# Patient Record
Sex: MALE | Race: WHITE | NOT HISPANIC OR LATINO | Employment: FULL TIME | URBAN - METROPOLITAN AREA
[De-identification: names, ages, dates, MRNs, and addresses within clinical notes are randomized per-mention and may not be internally consistent; named-entity substitution may affect disease eponyms.]

---

## 2023-09-28 PROBLEM — E78.00 HYPERCHOLESTEROLEMIA: Status: ACTIVE | Noted: 2023-09-28

## 2023-09-28 PROBLEM — I25.10 CORONARY ARTERY DISEASE: Status: ACTIVE | Noted: 2023-09-28

## 2023-09-28 PROBLEM — I10 BENIGN ESSENTIAL HTN: Status: ACTIVE | Noted: 2023-09-28

## 2023-09-28 RX ORDER — AMLODIPINE BESYLATE 2.5 MG/1
2.5 TABLET ORAL DAILY
COMMUNITY

## 2023-09-28 RX ORDER — NAPROXEN SODIUM 220 MG/1
TABLET, FILM COATED ORAL
COMMUNITY
Start: 2021-02-19

## 2023-09-28 RX ORDER — ROSUVASTATIN CALCIUM 5 MG/1
1 TABLET, COATED ORAL DAILY
COMMUNITY
Start: 2021-02-19 | End: 2024-05-02 | Stop reason: WASHOUT

## 2023-10-03 ENCOUNTER — DOCUMENTATION (OUTPATIENT)
Dept: NEUROLOGY | Facility: CLINIC | Age: 73
End: 2023-10-03
Payer: COMMERCIAL

## 2023-10-03 NOTE — PROGRESS NOTES
RESULT REVIEW    Reviewed outside MRI brain films on PACS from [facility], on the patient's request.  Minimal periventricular ischemic changes and mild atrophy.  MRA was done as well.  No report is available from [facility].

## 2023-10-04 ENCOUNTER — OFFICE VISIT (OUTPATIENT)
Dept: NEUROLOGY | Facility: CLINIC | Age: 73
End: 2023-10-04
Payer: COMMERCIAL

## 2023-10-04 VITALS
HEART RATE: 76 BPM | DIASTOLIC BLOOD PRESSURE: 75 MMHG | SYSTOLIC BLOOD PRESSURE: 119 MMHG | BODY MASS INDEX: 27.62 KG/M2 | WEIGHT: 221 LBS

## 2023-10-04 DIAGNOSIS — G20.C PARKINSONISM, UNSPECIFIED PARKINSONISM TYPE (MULTI): Primary | ICD-10-CM

## 2023-10-04 PROBLEM — R25.1 TREMOR: Status: ACTIVE | Noted: 2023-10-04

## 2023-10-04 PROCEDURE — 1036F TOBACCO NON-USER: CPT | Performed by: PSYCHIATRY & NEUROLOGY

## 2023-10-04 PROCEDURE — 1159F MED LIST DOCD IN RCRD: CPT | Performed by: PSYCHIATRY & NEUROLOGY

## 2023-10-04 PROCEDURE — 3074F SYST BP LT 130 MM HG: CPT | Performed by: PSYCHIATRY & NEUROLOGY

## 2023-10-04 PROCEDURE — 1160F RVW MEDS BY RX/DR IN RCRD: CPT | Performed by: PSYCHIATRY & NEUROLOGY

## 2023-10-04 PROCEDURE — 1125F AMNT PAIN NOTED PAIN PRSNT: CPT | Performed by: PSYCHIATRY & NEUROLOGY

## 2023-10-04 PROCEDURE — 3078F DIAST BP <80 MM HG: CPT | Performed by: PSYCHIATRY & NEUROLOGY

## 2023-10-04 PROCEDURE — 99213 OFFICE O/P EST LOW 20 MIN: CPT | Performed by: PSYCHIATRY & NEUROLOGY

## 2023-10-04 RX ORDER — CARBIDOPA AND LEVODOPA 25; 100 MG/1; MG/1
1 TABLET ORAL 3 TIMES DAILY
COMMUNITY
Start: 2023-07-26 | End: 2024-05-02 | Stop reason: WASHOUT

## 2023-10-04 RX ORDER — ATORVASTATIN CALCIUM 40 MG/1
20 TABLET, FILM COATED ORAL NIGHTLY
COMMUNITY
Start: 2023-09-12 | End: 2024-05-02 | Stop reason: WASHOUT

## 2023-10-04 RX ORDER — AZITHROMYCIN 250 MG/1
TABLET, FILM COATED ORAL
COMMUNITY
Start: 2023-08-18 | End: 2024-05-02 | Stop reason: WASHOUT

## 2023-10-04 ASSESSMENT — PATIENT HEALTH QUESTIONNAIRE - PHQ9
SUM OF ALL RESPONSES TO PHQ9 QUESTIONS 1 AND 2: 0
2. FEELING DOWN, DEPRESSED OR HOPELESS: NOT AT ALL
1. LITTLE INTEREST OR PLEASURE IN DOING THINGS: NOT AT ALL

## 2023-10-04 ASSESSMENT — UNIFIED PARKINSONS DISEASE RATING SCALE (UPDRS)
LEG_AGILITY_LEFT: 0
AMPLITUDE_LIP_JAW: 0
SPEECH: 1
POSTURAL_TREMOR_LEFTHAND: 1
TOETAPPING_RIGHT: 0
LEVODOPA: NO
RIGIDITY_RUE: 0
AMPLITUDE_RLE: 0
RIGIDITY_RLE: 1
PRONATION_SUPINATION_LEFT: 1
AMPLITUDE_LUE: 1
HANDMOVEMENTS_RIGHT: 0
CONSTANCY_TREMOR_ATREST: 1
FREEZING_GAIT: 0
RIGIDITY_LLE: 1
AMPLITUDE_LLE: 0
GAIT: 1
RIGIDITY_NECK: 0
POSTURAL_TREMOR_RIGHTHAND: 0
FINGER_TAPPING_RIGHT: 0
SPONTANEITY_OF_MOVEMENT: 1
DYSKINESIAS_PRESENT: NO
FINGER_TAPPING_LEFT: 1
POSTURE: 1
LEG_AGILITY_RIGHT: 0
KINETIC_TREMOR_RIGHTHAND: 0
TOETAPPING_LEFT: 0
KINETIC_TREMOR_LEFTHAND: 0
PRONATION_SUPINATION_RIGHT: 0
FACIAL_EXPRESSION: 1
CHAIR_RISING_SCALE: 1
POSTURAL_STABILITY: 0
TOTAL_SCORE: 15
RIGIDITY_LUE: 1
AMPLITUDE_RUE: 0

## 2023-10-04 ASSESSMENT — PAIN SCALES - GENERAL: PAINLEVEL: 6

## 2023-10-04 ASSESSMENT — ENCOUNTER SYMPTOMS
DEPRESSION: 0
LOSS OF SENSATION IN FEET: 0
OCCASIONAL FEELINGS OF UNSTEADINESS: 0

## 2023-10-04 NOTE — PROGRESS NOTES
Subjective     Khurram Nicole is a right handed  72 y.o. year old male who presents with FUV of PD.   Visit type: follow up visit     HPIHistory of Present Illness:   Mr. Nicole is a 71 YO RH man with HTN, CAD, depression here for FUV of PD.Today he reports that the Sinemet did not improve his symptoms.and the Sinemet made him sleepy and he weaned off the medication and the tremor did not change when he discontinued medication.    Prior HX:Patient reports tremors that started a couple years ago in his L arm present at rest and with posture/action like driving or doing work with his hands. At first if he thought about it he could stop it but now it seems to have a mind of its own. He denies stiffness, slowness, gait slowed down and wife has noticed decreased arm swing. He was started on C/L at [facility] in August 2022 25/100mg 1 tab TID with no reported change in sx. No DA blocker exposure.     He does report hyposmia since 1996 after an upper respiratory infection, No constipation or RBD.  he does report h/o depression years ago for which he was briefly on sertraline,     no memory changes or hallucinations, no urinary sx, no OH, no mood changes.    Review of Systems:   - Detailed reviewed of systems was reviewed with the patient and was negative unless otherwise noted.  It is documented in the patient assessment form to be scanned in.     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  Objective   Neurological Exam    Physical Exam   Office Visit from 10/4/2023 in [facility] with Aldo Nguyen MD    10/4/2023    1723   Motor Examination     Is the patient on medication for treating the symptoms of Parkinson's Disease? --   Is the patient on Levodopa? No   Speech 1   Facial Expression 1   Rigidty Neck 0   Rigidty RUE 0   Rigidity - LUE 1   Rigidity RLE 1   Rigidity LLE 1   Finger Tapping Right Hand 0   Finger Tapping Left Hand 1   Hand Movements- Right Hand 0   Hand Movements- Left Hand 1   Pronatiaon-Supination Movments - Right Hand 0   Pronatiaon-Supination Movments Left Hand 1   Toe Tapping Right Foot 0   Toe Tapping - Left Foot 0   Leg Agility - Right Leg 0   Leg Agility - Left leg 0   Arising from Chair 1   Gait 1   Freezing of Gait 0   Postural Stability 0   Posture 1   Global Spontanteity of Movment ( Body Bradykinesia) 1   Postural Tremor - Right Hand 0   Postural Tremor - Left hand 1   Kinetic Tremor - Right hand 0   Kinetic Tremor - Left hand 0   Rest Tremor Amplitude - RUE 0   Rest Tremor Amplitude - LUE 1   Rest Tremor Amplitude - RLE 0   Rest Tremor Amplitude - LLE 0   Rest Tremor Amplitude - Lip/Jaw 0   Constancy of Rest Tremor 1   MDS UPDRS Total Score 15   Were dyskinesias (chorea or dystonia) present during examination? No   Hoen and Yahr Stage --                     .                                       Assessment/Plan Mr. Nicole is a 73 YO RH man with HTN, depression for FUV of PD. He has Mild LH rest and postural tremor, asymmetric bradykinesia and decreased arm swing on LT side. There are no red flags for atypical parkinsonism at this time. No robust response to low-dose sinemet, and felt fatigue and discontinued Sinemet.his symptoms are very mild.Based on exam he has probable parkinsonism.Based on his exam I recommended him that he can not be on medication if he develops side effect with medication but if tremor is bothersome we can try another  types of medication such as Rasagiline.   Plan:  -Exercise    - RTC 6 months

## 2023-10-04 NOTE — PATIENT INSTRUCTIONS
You were visited by Dr Nguyen and Dr Henriquez today.  Based on your symptoms you have mild left  hand rest tremor and bradykinesia(slowness)in your Lt side more than on RT side.  Your symptoms are very mild and we don`t think you need medication at this time.As you developed side effects with sinemet.  Exercise can slow the progression of Parkinson disease.    Plan:  Try do exercise such as aerobic   Follow up in 6 months

## 2024-04-14 NOTE — PROGRESS NOTES
History of Present Complaint:  The patient was referred to us by Referring Provider: ***. this is 73 y.o.  male {Accompanied by:[id number]}with a past history of {kt past medical history:10795} hand pain after ORIF of left ring finger proximal phalanx and repair of brain and long finger extensor tendon injuries 11/9/2023 related to crush injury who continues to have pain on tramadol 3 times daily and Advil presenting with {kt mrdica symptoms:16317}    Pain started {pain onset:48253}  Pain is {Better or worse:84737}   Patient history significant for the following red flags: {Red flags:11494}  The pain is described as {Pain description:04462} and is relieved by {pain relief:34952}      Prior Pain Therapies: {Prior pain therapy:25134}    Past surgical history:  {Past surgical history:16670}           Procedures:   *** the patient has had a ***% improvement in pain.    Portions of record reviewed for pertinent issues: active problem list, medication list, allergies, family history, social history, notes from last encounter, encounters, lab results, imaging and other available records.    I have personally reviewed the OARRS report for this patient. This report is scanned into the electronic medical record. I have considered the risks of abuse, dependence, addiction and diversion. It showed: From [facility] and sometimes oxycodone  OPIOID RISK ASSESSMENT SCORE ***/26  Opioid agreement: ***  Activities of daily living: ***  Adverse effects: ***  Analgesia: W/O ***/10, W ***/10  {***}  Toxicology screen: ***  Aberrant behavior: ***      Diagnostic studies:  ***      Employment/disability/litigation: {ktlitigation:51853}    Social History:  {KT social history:95677}       Review of Systems       Physical Exam       Assessment  ***           Plan  At least 50% of the visit was involved in the discussion of the options for treatment. We discussed exercises, medication, interventional therapies and surgery. Healthy life  style is essential with patient hard work to achieve the wellness. In addition; discussion with the patient and/or family about any of the diagnostic results, impressions and/or recommended diagnostic studies, prognosis, risks and benefits of treatment options, instructions for treatment and/or follow-up, importance of compliance with chosen treatment options, risk-factor reduction, and patient/family education.         Pool therapy, walking in the pool, at least 3x per week for 30 minutes  *** Smoking cessation  Healthy lifestyle and anti-inflammatory diet in addition to weight control discussed with the patient  Alternative chronic pain therapies was discussed, encouraged and information was handed  Return to Clinic 3 months       *Please note this report has been produced using speech recognition software and may contain errors related to that system including grammar, punctuation and spelling as well as words and phrases that may be inappropriate. If there are questions or concerns, please feel free to contact me to clarify.    Miquel Winkler MD

## 2024-04-18 ENCOUNTER — APPOINTMENT (OUTPATIENT)
Dept: PAIN MEDICINE | Facility: CLINIC | Age: 74
End: 2024-04-18
Payer: COMMERCIAL

## 2024-04-29 NOTE — PROGRESS NOTES
Left ring finger proximal phalanx repair and tendon repair on 11/9/2023 continue to have pain on tramadol from PCP all at the [facility] Charlie Mcgee DO.  The patient was evaluated by Dr. Galo Martinez at the [facility] and he increased his vitamin D to 50,000 units daily and calcium and tramadol from PCP and the patient used CBD oil    [facility] case: Giagnosis:  S62.615B - fracture 4th finger   S61.213A - laceration middle finger     History of Present Complaint:  The patient was referred to us by Referring Provider: Charlie Mcgee DO.. this is 73 y.o.  male accompanied by his wife with a past history of HTN , Depression , hx of stroke , parkinson's , hyerlipodema  presenting with left hand pain after crush injury on 11/6/2023.  The patient had crushed bone in addition to tendon repair.  The patient continued to have pain requires tramadol from his PCP.  His wife asked about CRPS since she searched it.  I explained to her that he has signs of CRPS but CRPS diagnosis is by ruling out every other pathology.  His surgeon need to make this diagnosis if he thinks the original disease and pathology is being repaired and cured.  According to the x-ray report from 4/9/2024 the patient still has no bony bridges of his commuted fracture and his surgeon ordered bone stimulator and placed him on vitamin D and calcium in addition to Caltrate       Procedures:   none    Portions of record reviewed for pertinent issues: active problem list, medication list, allergies, family history, social history, notes from last encounter, encounters, lab results, imaging and other available records.    I have personally reviewed the OARRS report for this patient. This report is scanned into the electronic medical record. I have considered the risks of abuse, dependence, addiction and diversion. It showed: Tramadol and Percocet from Charlie Mcgee DO   OPIOID RISK ASSESSMENT SCORE 1/26  Aberrant behavior:  none      Diagnostic studies:  Stable ORIF of commuted fracture of the fourth finger with lack of bone bridging      Employment/disability/litigation: [employer] crush injury of the left hand,  for [employer]  Social History:  finished high school and some classes in college denies smoking drinking use of illicit drugs      Review of Systems   HENT: Negative.     Eyes: Negative.    Respiratory: Negative.     Cardiovascular: Negative.    Gastrointestinal: Negative.    Endocrine: Negative.    Genitourinary: Negative.    Musculoskeletal:  Positive for arthralgias and myalgias.   Skin: Negative.    Neurological: Negative.    Hematological: Negative.    Psychiatric/Behavioral: Negative.            Physical Exam  Vitals and nursing note reviewed.   Constitutional:       Appearance: Normal appearance.   HENT:      Head: Normocephalic and atraumatic.      Nose: Nose normal.   Eyes:      Extraocular Movements: Extraocular movements intact.      Conjunctiva/sclera: Conjunctivae normal.      Pupils: Pupils are equal, round, and reactive to light.   Cardiovascular:      Rate and Rhythm: Normal rate and regular rhythm.      Pulses: Normal pulses.      Heart sounds: Normal heart sounds.   Pulmonary:      Effort: Pulmonary effort is normal.      Breath sounds: Normal breath sounds.   Abdominal:      General: Abdomen is flat. Bowel sounds are normal.      Palpations: Abdomen is soft.   Skin:     General: Skin is warm.   Neurological:      Mental Status: He is alert.      Comments: Allodynia and hyperalgesia of the left hand mostly in the palm area with edema compared to the right hand and sweaty palms.  Pulses and warm hand.  Limited range of motion of his fingers   Psychiatric:         Mood and Affect: Mood normal.         Behavior: Behavior normal.            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  The patient gets tramadol from his PCP up to 3 times a day to control his pain.  He may have CRPS but since that he has nonunion I would like the surgeon to submit for his diagnosis of CRPS to his [facility].  In the meantime we will start him on gabapentin 100 mg slow titration, prednisone 10 mg taper dose, and keep him on the tramadol 50 mg 3 times daily as needed.  Neuro supplement ordered and the patient has new orders for occupational therapy from his surgeon.  Consider left stellate ganglion block if the above failed           Plan  At least 50% of the visit was involved in the discussion of the options for treatment. We discussed exercises, medication, interventional therapies and surgery. Healthy life style is essential with patient hard work to achieve the wellness. In addition; discussion with the patient and/or family about any of the diagnostic results, impressions and/or recommended diagnostic studies, prognosis, risks and benefits of treatment options, instructions for treatment and/or follow-up, importance of compliance with chosen treatment options, risk-factor reduction, and patient/family education.         Pool therapy, walking in the pool, at least 3x per week for 30 minutes  Neuro supplement ordered  Patient has a referral to occupational therapy from his surgeon  Prednisone 10 mg taper dose  Gabapentin 100 mg titration schedule handed to the patient  Consider left stellate ganglion block if the above failed  Healthy lifestyle and anti-inflammatory diet in addition to weight control discussed with the patient  Alternative chronic pain therapies was discussed, encouraged and information was handed  Return to Clinic 1-2 months       *Please note this report has been produced using speech recognition software and may contain errors related to that system including grammar, punctuation and spelling as well as words and phrases that may be inappropriate. If there are  questions or concerns, please feel free to contact me to clarify.    Miquel Winkler MD

## 2024-05-02 ENCOUNTER — OFFICE VISIT (OUTPATIENT)
Dept: PAIN MEDICINE | Facility: CLINIC | Age: 74
End: 2024-05-02
Payer: COMMERCIAL

## 2024-05-02 VITALS
WEIGHT: 230 LBS | HEIGHT: 75 IN | BODY MASS INDEX: 28.6 KG/M2 | RESPIRATION RATE: 18 BRPM | HEART RATE: 58 BPM | SYSTOLIC BLOOD PRESSURE: 151 MMHG | DIASTOLIC BLOOD PRESSURE: 90 MMHG | OXYGEN SATURATION: 94 % | TEMPERATURE: 98.1 F

## 2024-05-02 DIAGNOSIS — S67.22XS CRUSHING INJURY OF LEFT HAND, SEQUELA: ICD-10-CM

## 2024-05-02 DIAGNOSIS — G89.4 CHRONIC PAIN SYNDROME: ICD-10-CM

## 2024-05-02 DIAGNOSIS — S61.213S LACERATION OF LEFT MIDDLE FINGER WITHOUT FOREIGN BODY WITHOUT DAMAGE TO NAIL, SEQUELA: Primary | ICD-10-CM

## 2024-05-02 DIAGNOSIS — S62.615S: ICD-10-CM

## 2024-05-02 PROCEDURE — 1125F AMNT PAIN NOTED PAIN PRSNT: CPT | Performed by: ANESTHESIOLOGY

## 2024-05-02 PROCEDURE — 99214 OFFICE O/P EST MOD 30 MIN: CPT | Performed by: ANESTHESIOLOGY

## 2024-05-02 PROCEDURE — 3080F DIAST BP >= 90 MM HG: CPT | Performed by: ANESTHESIOLOGY

## 2024-05-02 PROCEDURE — 1159F MED LIST DOCD IN RCRD: CPT | Performed by: ANESTHESIOLOGY

## 2024-05-02 PROCEDURE — 1036F TOBACCO NON-USER: CPT | Performed by: ANESTHESIOLOGY

## 2024-05-02 PROCEDURE — 99204 OFFICE O/P NEW MOD 45 MIN: CPT | Performed by: ANESTHESIOLOGY

## 2024-05-02 PROCEDURE — 3077F SYST BP >= 140 MM HG: CPT | Performed by: ANESTHESIOLOGY

## 2024-05-02 RX ORDER — ACETAMINOPHEN 325 MG/1
650 TABLET ORAL EVERY 6 HOURS PRN
COMMUNITY
Start: 2023-11-07

## 2024-05-02 RX ORDER — ASCORBIC ACID 500 MG
500 TABLET ORAL DAILY
Qty: 30 TABLET | Refills: 11 | Status: SHIPPED | OUTPATIENT
Start: 2024-05-02

## 2024-05-02 RX ORDER — PREDNISONE 10 MG/1
TABLET ORAL
Qty: 64 TABLET | Refills: 0 | Status: SHIPPED | OUTPATIENT
Start: 2024-05-02

## 2024-05-02 RX ORDER — VITAMIN B COMPLEX
1 CAPSULE ORAL 2 TIMES DAILY
Qty: 60 CAPSULE | Refills: 11 | Status: SHIPPED | OUTPATIENT
Start: 2024-05-02 | End: 2025-05-02

## 2024-05-02 RX ORDER — ATORVASTATIN CALCIUM 20 MG/1
20 TABLET, FILM COATED ORAL NIGHTLY
COMMUNITY
Start: 2024-04-01

## 2024-05-02 RX ORDER — IBUPROFEN 200 MG
200 TABLET ORAL
COMMUNITY

## 2024-05-02 RX ORDER — GABAPENTIN 100 MG/1
100 CAPSULE ORAL 3 TIMES DAILY
Qty: 180 CAPSULE | Refills: 1 | Status: SHIPPED | OUTPATIENT
Start: 2024-05-02 | End: 2025-05-02

## 2024-05-02 RX ORDER — ERGOCALCIFEROL 1.25 MG/1
1 CAPSULE ORAL
COMMUNITY

## 2024-05-02 RX ORDER — TRAMADOL HYDROCHLORIDE 50 MG/1
50 TABLET ORAL EVERY 8 HOURS PRN
Qty: 21 TABLET | Refills: 0 | Status: SHIPPED | OUTPATIENT
Start: 2024-05-02 | End: 2024-05-09

## 2024-05-02 RX ORDER — ACETAMINOPHEN 160 MG/5ML
200 SUSPENSION, ORAL (FINAL DOSE FORM) ORAL 3 TIMES DAILY
Qty: 90 CAPSULE | Refills: 11 | Status: SHIPPED | OUTPATIENT
Start: 2024-05-02 | End: 2025-05-02

## 2024-05-02 RX ORDER — IBUPROFEN 100 MG/5ML
SUSPENSION, ORAL (FINAL DOSE FORM) ORAL
COMMUNITY

## 2024-05-02 RX ORDER — SERTRALINE HYDROCHLORIDE 100 MG/1
100 TABLET, FILM COATED ORAL
COMMUNITY
Start: 2024-01-08

## 2024-05-02 RX ORDER — PERPHENAZINE 16 MG
TABLET ORAL
Qty: 180 CAPSULE | Refills: 11 | Status: SHIPPED | OUTPATIENT
Start: 2024-05-02

## 2024-05-02 RX ORDER — TRAMADOL HYDROCHLORIDE 50 MG/1
50 TABLET ORAL EVERY 8 HOURS PRN
COMMUNITY
Start: 2024-04-30 | End: 2024-05-07

## 2024-05-02 ASSESSMENT — ENCOUNTER SYMPTOMS
EYES NEGATIVE: 1
OCCASIONAL FEELINGS OF UNSTEADINESS: 0
ENDOCRINE NEGATIVE: 1
RESPIRATORY NEGATIVE: 1
HEMATOLOGIC/LYMPHATIC NEGATIVE: 1
GASTROINTESTINAL NEGATIVE: 1
PSYCHIATRIC NEGATIVE: 1
MYALGIAS: 1
ARTHRALGIAS: 1
DEPRESSION: 0
NEUROLOGICAL NEGATIVE: 1
CARDIOVASCULAR NEGATIVE: 1
LOSS OF SENSATION IN FEET: 0

## 2024-05-02 ASSESSMENT — LIFESTYLE VARIABLES
HOW OFTEN DURING THE LAST YEAR HAVE YOU HAD A FEELING OF GUILT OR REMORSE AFTER DRINKING: NEVER
HAVE YOU OR SOMEONE ELSE BEEN INJURED AS A RESULT OF YOUR DRINKING: NO
HOW MANY STANDARD DRINKS CONTAINING ALCOHOL DO YOU HAVE ON A TYPICAL DAY: PATIENT DOES NOT DRINK
AUDIT TOTAL SCORE: 0
HOW OFTEN DO YOU HAVE SIX OR MORE DRINKS ON ONE OCCASION: NEVER
HOW OFTEN DURING THE LAST YEAR HAVE YOU FOUND THAT YOU WERE NOT ABLE TO STOP DRINKING ONCE YOU HAD STARTED: NEVER
HOW OFTEN DURING THE LAST YEAR HAVE YOU BEEN UNABLE TO REMEMBER WHAT HAPPENED THE NIGHT BEFORE BECAUSE YOU HAD BEEN DRINKING: NEVER
HOW OFTEN DURING THE LAST YEAR HAVE YOU NEEDED AN ALCOHOLIC DRINK FIRST THING IN THE MORNING TO GET YOURSELF GOING AFTER A NIGHT OF HEAVY DRINKING: NEVER
TOTAL SCORE: 1
SKIP TO QUESTIONS 9-10: 1
HOW OFTEN DURING THE LAST YEAR HAVE YOU FAILED TO DO WHAT WAS NORMALLY EXPECTED FROM YOU BECAUSE OF DRINKING: NEVER
HAS A RELATIVE, FRIEND, DOCTOR, OR ANOTHER HEALTH PROFESSIONAL EXPRESSED CONCERN ABOUT YOUR DRINKING OR SUGGESTED YOU CUT DOWN: NO
HOW OFTEN DO YOU HAVE A DRINK CONTAINING ALCOHOL: NEVER
AUDIT-C TOTAL SCORE: 0

## 2024-05-02 ASSESSMENT — COLUMBIA-SUICIDE SEVERITY RATING SCALE - C-SSRS
1. IN THE PAST MONTH, HAVE YOU WISHED YOU WERE DEAD OR WISHED YOU COULD GO TO SLEEP AND NOT WAKE UP?: NO
6. HAVE YOU EVER DONE ANYTHING, STARTED TO DO ANYTHING, OR PREPARED TO DO ANYTHING TO END YOUR LIFE?: NO
2. HAVE YOU ACTUALLY HAD ANY THOUGHTS OF KILLING YOURSELF?: NO

## 2024-05-02 ASSESSMENT — PAIN SCALES - GENERAL: PAINLEVEL: 8

## 2024-05-02 ASSESSMENT — PATIENT HEALTH QUESTIONNAIRE - PHQ9
2. FEELING DOWN, DEPRESSED OR HOPELESS: NOT AT ALL
SUM OF ALL RESPONSES TO PHQ9 QUESTIONS 1 AND 2: 0
1. LITTLE INTEREST OR PLEASURE IN DOING THINGS: NOT AT ALL

## 2024-05-02 NOTE — PROGRESS NOTES
No chief complaint on file.    History of Present Complaint:  The patient was referred to us by Referring Provider: Self referral . this is 73 y.o.  male  with a past history of  HTN , Depression , hx of stroke , parkinson's , hyerlipodema    presenting with  left handed mid finger and ring finger pain , pain runs up his arm .    Pain started due work related injury , crushed injury [facility] to  11/6/2023  Pain is better ,hot wax , icing , tramadol .   Pain is worst , using the hand .  Patient is right handed .    The pain is described as achiness and throbbing in the left wrist and knuckles .  and is relieved by icing and the use of hot wax .    Prior Pain Therapies: Prior pain physician occupational therapy  and    Past surgical history:    Left ring finger proximal phalanx repair and tendon repair on 11/9/2023      Employment/disability/litigation:  patient is a high way tech for [employer]     ocial history: , Finished high school, and Higher education some college     Diagnostic studies:  xray's     Opioid Risk Assessment Score  1 /26

## 2024-05-02 NOTE — PATIENT INSTRUCTIONS
Gabapentin titration     Please take [id number] mg capsules as follows:              AM         PM    Bedtime       Day 1 0 0 1   Day 2 0 0 1   Day 3 0 1 1   Day 4 0 1 1   Day 5 1 1 1   Day 6 1 1 1   Day 7 1 1 1   Day 8 1 1 2   Day 9 1 1 2   Day 10 1 2 2   Day 11 1 2 2   Day 12 2 2 2   Day 13 2 2 2   Day 14 2 2 3   Day 15 2 2 3   Day 16 2 3 3   Day 17 2 3 3   Day 18 3 3 3   Day 19 3 3 3   Day 20 3 3 4   Day 21 3 3 4   Day 22 3 4 4   Day 23 3 4 4   Day 24 4 4 4     Do not exceed 3600mg per day.   Stop and maintain dose when symptoms resolve.  Reduce as instructed if side effects not tolerated.

## 2024-05-02 NOTE — LETTER
May 2, 2024     Charlie Mcgee DO  [address] [place] [id number]    Patient: Khurram Nicole   YOB: 1950   Date of Visit: 5/2/2024       Dear Dr. Charlie Mcgee DO:    Thank you for referring Khurram Nicole to me for evaluation. Below are my notes for this consultation.  If you have questions, please do not hesitate to call me. I look forward to following your patient along with you.       Sincerely,     Miquel Winkler MD      CC: No Recipients  ______________________________________________________________________________________    Left ring finger proximal phalanx repair and tendon repair on 11/9/2023 continue to have pain on tramadol from PCP all at the [facility] Charlie Mcgee DO.  The patient was evaluated by Dr. Galo Martinez at the [facility] and he increased his vitamin D to 50,000 units daily and calcium and tramadol from PCP and the patient used CBD oil    [facility] case    History of Present Complaint:  The patient was referred to us by Referring Provider: Charlie Mcgee DO.. this is 73 y.o.  male accompanied by his wife with a past history of HTN , Depression , hx of stroke , parkinson's , hyerlipodema  presenting with left hand pain after crush injury on 11/6/2023.  The patient had crushed bone in addition to tendon repair.  The patient continued to have pain requires tramadol from his PCP.  His wife asked about CRPS since she searched it.  I explained to her that he has signs of CRPS but CRPS diagnosis is by ruling out every other pathology.  His surgeon need to make this diagnosis if he thinks the original disease and pathology is being repaired and cured.  According to the x-ray report from 4/9/2024 the patient still has no bony bridges of his commuted fracture and his surgeon ordered bone stimulator and placed him on vitamin D and calcium in addition to Caltrate       Procedures:   none    Portions of record reviewed for  pertinent issues: active problem list, medication list, allergies, family history, social history, notes from last encounter, encounters, lab results, imaging and other available records.    I have personally reviewed the OARRS report for this patient. This report is scanned into the electronic medical record. I have considered the risks of abuse, dependence, addiction and diversion. It showed: Tramadol and Percocet from Charlie Mcgee DO   OPIOID RISK ASSESSMENT SCORE 1/26  Aberrant behavior: none      Diagnostic studies:  Stable ORIF of commuted fracture of the fourth finger with lack of bone bridging      Employment/disability/litigation: [employer] crush injury of the left hand,  for [employer]  Social History:  finished high school and some classes in college denies smoking drinking use of illicit drugs      Review of Systems   HENT: Negative.     Eyes: Negative.    Respiratory: Negative.     Cardiovascular: Negative.    Gastrointestinal: Negative.    Endocrine: Negative.    Genitourinary: Negative.    Musculoskeletal:  Positive for arthralgias and myalgias.   Skin: Negative.    Neurological: Negative.    Hematological: Negative.    Psychiatric/Behavioral: Negative.            Physical Exam  Vitals and nursing note reviewed.   Constitutional:       Appearance: Normal appearance.   HENT:      Head: Normocephalic and atraumatic.      Nose: Nose normal.   Eyes:      Extraocular Movements: Extraocular movements intact.      Conjunctiva/sclera: Conjunctivae normal.      Pupils: Pupils are equal, round, and reactive to light.   Cardiovascular:      Rate and Rhythm: Normal rate and regular rhythm.      Pulses: Normal pulses.      Heart sounds: Normal heart sounds.   Pulmonary:      Effort: Pulmonary effort is normal.      Breath sounds: Normal breath sounds.   Abdominal:      General: Abdomen is flat. Bowel sounds are normal.      Palpations: Abdomen is soft.   Skin:     General: Skin is warm.    Neurological:      Mental Status: He is alert.      Comments: Allodynia and hyperalgesia of the left hand mostly in the palm area with edema compared to the right hand and sweaty palms.  Pulses and warm hand.  Limited range of motion of his fingers   Psychiatric:         Mood and Affect: Mood normal.         Behavior: Behavior normal.            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  The patient gets tramadol from his PCP up to 3 times a day to control his pain.  He may have CRPS but since that he has nonunion I would like the surgeon to submit for his diagnosis of CRPS to his [facility].  In the meantime we will start him on gabapentin 100 mg slow titration, prednisone 10 mg taper dose, and keep him on the tramadol 50 mg 3 times daily as needed.  Neuro supplement ordered and the patient has new orders for occupational therapy from his surgeon.  Consider left stellate ganglion block if the above failed           Plan  At least 50% of the visit was involved in the discussion of the options for treatment. We discussed exercises, medication, interventional therapies and surgery. Healthy life style is essential with patient hard work to achieve the wellness. In addition; discussion with the patient and/or family about any of the diagnostic results, impressions and/or recommended diagnostic studies, prognosis, risks and benefits of treatment options, instructions for treatment and/or follow-up, importance of compliance with chosen treatment options, risk-factor reduction, and patient/family education.         Pool therapy, walking in the pool, at least 3x per week for 30 minutes  Neuro supplement ordered  Patient has a referral to occupational therapy from his surgeon  Prednisone 10 mg taper dose  Gabapentin 100 mg titration schedule handed to the patient  Consider left  stellate ganglion block if the above failed  Healthy lifestyle and anti-inflammatory diet in addition to weight control discussed with the patient  Alternative chronic pain therapies was discussed, encouraged and information was handed  Return to Clinic 1-2 months       *Please note this report has been produced using speech recognition software and may contain errors related to that system including grammar, punctuation and spelling as well as words and phrases that may be inappropriate. If there are questions or concerns, please feel free to contact me to clarify.    Miquel Winkler MD

## 2024-06-11 NOTE — PROGRESS NOTES
[facility] case: Diagnosis:  S62.615B - fracture 4th finger   S61.213A - laceration middle finger     SUBJECTIVE:  This is 73 y.o.  male with PMH of HTN , Depression , hx of stroke , parkinson's , hyperlipidemia presenting with left hand pain after crush injury on 11/6/2023.  The patient had crushed bone in addition to tendon repair.  The patient continued to have pain requires tramadol from his PCP.  The patient has typical CRPS symptoms with significant residual pain despite the healing process, significant hyperalgesia allodynia in addition to pseudomotor and vasomotor changes.  The patient was started on prednisone in addition to neuro supplement and gabapentin 100 mg titration schedule on his initial visit who is here for follow-up stating that he did not have good luck with his treatment.  He is up on his gabapentin 200 mg 3 times a day and that make him sleepy most of the time and is only partially helping with his pain.  Tramadol helps him more than anything else and I recommended for him to take the tramadol with ibuprofen and Tylenol together and I gave him tramadol to take 3 times daily as needed.  I discussed with him and his wife who was on the phone the plan for left stellate ganglion block just in case that helped with his pain in case that his pain is sympathetically mediated.  His wife looks like she does her search on Google for his condition and she stated that she thinks that this injection has a lot of complications and she is worried about it.  I explained to them that I cannot do an echo do to help him and I gave her the information that I know about the risk and benefits of the procedure itself.  Jossie asked me again about his nonunion in his finger and if another surgery will be possible for him and I told her I it is not my specialty I defer that to hand surgeons.  In the meantime we are going to DC his gabapentin and started on pregabalin 25 mg titration schedule which I placed it in his orders  as well.      Prior office visit:  5/2/2024: Left ring finger proximal phalanx repair and tendon repair on 11/9/2023 continue to have pain on tramadol from PCP all at the [facility] Charlie Mcgee DO.  The patient was evaluated by Dr. Galo Martinez at the [facility] and he increased his vitamin D to 50,000 units daily and calcium and tramadol from PCP and the patient used CBD oil     History of Present Complaint:  The patient was referred to us by Referring Provider: Charlie Mcgee DO.. this is 73 y.o.  male accompanied by his wife with a past history of HTN , Depression , hx of stroke , parkinson's , hyerlipodema  presenting with left hand pain after crush injury on 11/6/2023.  The patient had crushed bone in addition to tendon repair.  The patient continued to have pain requires tramadol from his PCP.  His wife asked about CRPS since she searched it.  I explained to her that he has signs of CRPS but CRPS diagnosis is by ruling out every other pathology.  His surgeon need to make this diagnosis if he thinks the original disease and pathology is being repaired and cured.  According to the x-ray report from 4/9/2024 the patient still has no bony bridges of his commuted fracture and his surgeon ordered bone stimulator and placed him on vitamin D and calcium in addition to Caltrate  Assessment  73 years old who had a crush injury to his left hand with nonunion now complaining of pain in his hand that migrated up to his arm that is not getting better since his crush injury.  The patient gets tramadol from his PCP up to 3 times a day to control his pain.  He may have CRPS but since that he has nonunion I would like the surgeon to submit for his diagnosis of CRPS to his [facility].  In the meantime we will start him on gabapentin 100 mg slow titration, prednisone 10 mg taper dose, and keep him on the tramadol 50 mg 3 times daily as needed.  Neuro supplement ordered and the patient has new orders for  occupational therapy from his surgeon.  Consider left stellate ganglion block if the above failed     Procedures:   none     Portions of record reviewed for pertinent issues: active problem list, medication list, allergies, family history, social history, notes from last encounter, encounters, lab results, imaging and other available records.     I have personally reviewed the OARRS report for this patient. This report is scanned into the electronic medical record. I have considered the risks of abuse, dependence, addiction and diversion. It showed: Tramadol and Percocet from Charlie Mcgee DO   OPIOID RISK ASSESSMENT SCORE 1/26  Aberrant behavior: none        Diagnostic studies:  Stable ORIF of commuted fracture of the fourth finger with lack of bone bridging        Employment/disability/litigation: [employer] crush injury of the left hand,  for [employer]  Social History:  finished high school and some classes in college denies smoking drinking use of illicit drugs         Review of Systems   HENT: Negative.     Eyes: Negative.    Respiratory: Negative.     Cardiovascular: Negative.    Gastrointestinal: Negative.    Endocrine: Negative.    Genitourinary: Negative.    Musculoskeletal:  Positive for arthralgias and myalgias.   Skin: Negative.    Neurological:  Positive for tremors.        Interestingly the patient has tremor from his Parkinson disease only in the left hand   Hematological: Negative.    Psychiatric/Behavioral: Negative.          Physical Exam  Vitals and nursing note reviewed.   Constitutional:       Appearance: Normal appearance.   HENT:      Head: Normocephalic and atraumatic.      Nose: Nose normal.   Eyes:      Extraocular Movements: Extraocular movements intact.      Conjunctiva/sclera: Conjunctivae normal.      Pupils: Pupils are equal, round, and reactive to light.   Cardiovascular:      Rate and Rhythm: Normal rate and regular rhythm.      Pulses: Normal pulses.      Heart  sounds: Normal heart sounds.   Pulmonary:      Effort: Pulmonary effort is normal.      Breath sounds: Normal breath sounds.   Abdominal:      General: Abdomen is flat. Bowel sounds are normal.      Palpations: Abdomen is soft.   Musculoskeletal:      Comments: Significant tremor of the left hand with pseudomotor and vasomotor changes of the ring finger with limited range of motion.  Some hyperalgesia and allodynia as well   Skin:     General: Skin is warm.   Neurological:      General: No focal deficit present.      Mental Status: He is alert and oriented to person, place, and time.   Psychiatric:         Mood and Affect: Mood normal.         Behavior: Behavior normal.                      Plan  At least 50% of the visit was involved in the discussion of the options for treatment. We discussed exercises, medication, interventional therapies and surgery. Healthy life style is essential with patient hard work to achieve the wellness. In addition; discussion with the patient and/or family about any of the diagnostic results, impressions and/or recommended diagnostic studies, prognosis, risks and benefits of treatment options, instructions for treatment and/or follow-up, importance of compliance with chosen treatment options, risk-factor reduction, and patient/family education.         Pool therapy, walking in the pool, at least 3x per week for 30 minutes  Continue self-directed physical therapy  DC gabapentin and start pregabalin 25 mg titration schedule was handed to the patient  Highly recommended left stellate ganglion block but his wife is worried about it and they can call for it when they decide to proceed with it  Continue occupational therapy  Follow-up with orthopedic hand surgery to discuss further surgical interventions  Healthy lifestyle and anti-inflammatory diet in addition to weight control discussed with the patient  Alternative chronic pain therapies was discussed, encouraged and information was  handed  Return to Clinic 2 to 3 months     *Please note this report has been produced using speech recognition software and may contain errors related to that system including grammar, punctuation and spelling as well as words and phrases that may be inappropriate. If there are questions or concerns, please feel free to contact me to clarify.    Miquel Winkler MD

## 2024-06-13 ENCOUNTER — OFFICE VISIT (OUTPATIENT)
Dept: PAIN MEDICINE | Facility: CLINIC | Age: 74
End: 2024-06-13
Payer: COMMERCIAL

## 2024-06-13 VITALS
RESPIRATION RATE: 18 BRPM | HEART RATE: 64 BPM | TEMPERATURE: 98.1 F | WEIGHT: 230 LBS | HEIGHT: 75 IN | SYSTOLIC BLOOD PRESSURE: 157 MMHG | DIASTOLIC BLOOD PRESSURE: 77 MMHG | OXYGEN SATURATION: 95 % | BODY MASS INDEX: 28.6 KG/M2

## 2024-06-13 DIAGNOSIS — G90.512 COMPLEX REGIONAL PAIN SYNDROME TYPE 1 OF LEFT UPPER EXTREMITY: Primary | ICD-10-CM

## 2024-06-13 PROCEDURE — 99214 OFFICE O/P EST MOD 30 MIN: CPT | Performed by: ANESTHESIOLOGY

## 2024-06-13 RX ORDER — TRAMADOL HYDROCHLORIDE 50 MG/1
50 TABLET ORAL EVERY 8 HOURS PRN
Qty: 90 TABLET | Refills: 1 | Status: SHIPPED | OUTPATIENT
Start: 2024-06-13 | End: 2024-08-12

## 2024-06-13 RX ORDER — PREGABALIN 25 MG/1
25 CAPSULE ORAL 3 TIMES DAILY
Qty: 90 CAPSULE | Refills: 1 | Status: SHIPPED | OUTPATIENT
Start: 2024-06-13 | End: 2024-08-12

## 2024-06-13 ASSESSMENT — ENCOUNTER SYMPTOMS
EYES NEGATIVE: 1
RESPIRATORY NEGATIVE: 1
MYALGIAS: 1
HEMATOLOGIC/LYMPHATIC NEGATIVE: 1
GASTROINTESTINAL NEGATIVE: 1
PSYCHIATRIC NEGATIVE: 1
ARTHRALGIAS: 1
OCCASIONAL FEELINGS OF UNSTEADINESS: 0
CARDIOVASCULAR NEGATIVE: 1
LOSS OF SENSATION IN FEET: 0
DEPRESSION: 0
ENDOCRINE NEGATIVE: 1
TREMORS: 1

## 2024-06-13 ASSESSMENT — PAIN - FUNCTIONAL ASSESSMENT: PAIN_FUNCTIONAL_ASSESSMENT: 0-10

## 2024-06-13 ASSESSMENT — PATIENT HEALTH QUESTIONNAIRE - PHQ9
SUM OF ALL RESPONSES TO PHQ9 QUESTIONS 1 AND 2: 0
1. LITTLE INTEREST OR PLEASURE IN DOING THINGS: NOT AT ALL
2. FEELING DOWN, DEPRESSED OR HOPELESS: NOT AT ALL

## 2024-06-13 ASSESSMENT — PAIN SCALES - GENERAL
PAINLEVEL: 7
PAINLEVEL_OUTOF10: 7

## 2024-06-13 ASSESSMENT — PAIN DESCRIPTION - DESCRIPTORS: DESCRIPTORS: ACHING;DULL;SHARP;SHOOTING

## 2024-06-13 NOTE — PATIENT INSTRUCTIONS
Please take pregabalin 25mg capsules s follows              AM         PM    Bedtime       Day 1 0 0 1   Day 2 0 0 1   Day 3 0 1 1   Day 4 0 1 1   Day 5 1 1 1   Day 6 1 1 1   Day 7 1 1 1   Day 8 1 1 2   Day 9 1 1 2   Day 10 1 2 2   Day 11 1 2 2   Day 12 2 2 2   Day 13 2 2 2   Day 14 2 2 3   Day 15 2 2 3   Day 16 2 3 3   Day 17 2 3 3   Day 18 3 3 3   Day 19 3 3 3   Day 20 3 3 4   Day 21 3 3 4   Day 22 3 4 4   Day 23 3 4 4   Day 24 4 4 4     Do not exceed 600mg per day.   Stop and maintain dose when symptoms resolve.  Reduce as instructed if side effects not tolerated.

## 2024-06-13 NOTE — PROGRESS NOTES
This is 73 y.o.  male with who has been treated for  left hand ring finger fracture . Pain is 10 %  better, The pain is described as  dull ache and sometimes is sharp and shooting  and is relieved by Medications prednisone very little improvement patient states that he was taking Tramadol has now run out and will like Dr. Winkler to give him some  Here for follow-up .  Chief Complaint   Patient presents with    Follow-up     5/2/2024 office visit 2-3 month follow up     Patient state that tramadol provides him 10 to 15% improvement.    Pain Therapies: Pool therapy, walking in the pool, at least 3x per week for 30 minutes  Neuro supplement ordered  Patient has a referral to occupational therapy from his surgeon  Prednisone 10 mg taper dose  Gabapentin 100 mg titration schedule handed to the patient  Consider left stellate ganglion block if the above failed  Healthy lifestyle and anti-inflammatory diet in addition to weight control discussed with the patient  Alternative chronic pain therapies was discussed, encouraged and information was handed  Return to Clinic 1-2 months

## 2024-06-14 ENCOUNTER — TELEPHONE (OUTPATIENT)
Dept: PAIN MEDICINE | Facility: CLINIC | Age: 74
End: 2024-06-14
Payer: COMMERCIAL

## 2024-06-17 ENCOUNTER — TELEPHONE (OUTPATIENT)
Dept: PAIN MEDICINE | Facility: CLINIC | Age: 74
End: 2024-06-17
Payer: COMMERCIAL

## 2024-07-11 ENCOUNTER — HOSPITAL ENCOUNTER (OUTPATIENT)
Dept: RADIOLOGY | Facility: HOSPITAL | Age: 74
Discharge: HOME | End: 2024-07-11
Payer: COMMERCIAL

## 2024-07-11 ENCOUNTER — OFFICE VISIT (OUTPATIENT)
Dept: ORTHOPEDIC SURGERY | Facility: HOSPITAL | Age: 74
End: 2024-07-11
Payer: COMMERCIAL

## 2024-07-11 DIAGNOSIS — S62.615B OPEN DISPLACED FRACTURE OF PROXIMAL PHALANX OF LEFT RING FINGER, INITIAL ENCOUNTER: Primary | ICD-10-CM

## 2024-07-11 DIAGNOSIS — M79.642 LEFT HAND PAIN: ICD-10-CM

## 2024-07-11 PROCEDURE — 73130 X-RAY EXAM OF HAND: CPT | Mod: LT

## 2024-07-11 PROCEDURE — 99214 OFFICE O/P EST MOD 30 MIN: CPT | Performed by: ORTHOPAEDIC SURGERY

## 2024-07-11 ASSESSMENT — PAIN SCALES - GENERAL: PAINLEVEL_OUTOF10: 8

## 2024-07-11 ASSESSMENT — PAIN - FUNCTIONAL ASSESSMENT: PAIN_FUNCTIONAL_ASSESSMENT: 0-10

## 2024-07-12 ENCOUNTER — TELEPHONE (OUTPATIENT)
Dept: ORTHOPEDIC SURGERY | Facility: HOSPITAL | Age: 74
End: 2024-07-12

## 2024-07-12 ENCOUNTER — EVALUATION (OUTPATIENT)
Dept: OCCUPATIONAL THERAPY | Facility: HOSPITAL | Age: 74
End: 2024-07-12
Payer: COMMERCIAL

## 2024-07-12 DIAGNOSIS — G56.42 COMPLEX REGIONAL PAIN SYNDROME TYPE 2 OF LEFT UPPER EXTREMITY: Primary | ICD-10-CM

## 2024-07-12 PROBLEM — G90.512 COMPLEX REGIONAL PAIN SYNDROME OF LEFT UPPER EXTREMITY: Status: ACTIVE | Noted: 2024-07-12

## 2024-07-12 PROCEDURE — 97166 OT EVAL MOD COMPLEX 45 MIN: CPT | Mod: GO | Performed by: OCCUPATIONAL THERAPIST

## 2024-07-12 PROCEDURE — 97110 THERAPEUTIC EXERCISES: CPT | Mod: GO | Performed by: OCCUPATIONAL THERAPIST

## 2024-07-12 ASSESSMENT — ENCOUNTER SYMPTOMS
WHEEZING: 0
SHORTNESS OF BREATH: 0
LOSS OF SENSATION IN FEET: 0
OCCASIONAL FEELINGS OF UNSTEADINESS: 0
DEPRESSION: 1
FEVER: 0
CHILLS: 0
FATIGUE: 0
MUSCULOSKELETAL NEGATIVE: 1

## 2024-07-12 ASSESSMENT — PAIN SCALES - GENERAL: PAINLEVEL_OUTOF10: 8

## 2024-07-12 ASSESSMENT — PAIN - FUNCTIONAL ASSESSMENT: PAIN_FUNCTIONAL_ASSESSMENT: 0-10

## 2024-07-12 NOTE — PROGRESS NOTES
Reason for Appointment  Chief Complaint   Patient presents with    Left Hand - Pain     History of Present Illness  New patient is a 73 y.o. male here today for evaluation of left hand pain especially the left ring finger interval history pleasant gentleman who has an extensive history from the injury back in November 6, 2023 when he presented to the emergency room at the [facility] when he smashed his left long and ring finger in a tailgate of a dump truck he was seen in the emergency room and admitted he was cleansed and given antibiotics.  Notes were reviewed from the emergency room and then he had extensor tendon injuries to the long and ring finger but open fractures with open treatment of the proximal phalanx of the ring finger.  Operative notes are also reviewed.  We did get x-rays today and there appears to be a nonunion of a large volar fragment but he has a severe contracture of that joint with pain and disuse of the hand but most of the pain is all in the ring finger not complaining of other hypersensitivity or any significant complex regional pain syndrome but he is having symptoms in the ring finger of hypersensitivity and pain due to the crush injury and dysfunction.  Plain x-rays today again are reviewed and show the nonunion.  He was ordered a bone stimulator but at this point as we have discussed future treatment that may not be necessary.  Hand therapy is necessary and is a good idea to get the rest of the digits moving but there is severe fixed contracture of the ring finger that would necessitate significant operative treatment and with his arthritic change that may be futile and cause significant pain issues long-term.  Past medical history allergies medications social and family history are reviewed    History reviewed. No pertinent past medical history.    History reviewed. No pertinent surgical history.    Medication Documentation Review Audit       Reviewed by Rose Marie Bal MA  (Medical Assistant) on 07/11/24 at 1433      Medication Order Taking? Sig Documenting Provider Last Dose Status   acetaminophen (Tylenol) 325 mg tablet [id number] Yes Take 2 tablets (650 mg) by mouth every 6 hours if needed. Historical Provider, MD Taking Active   alpha lipoic acid 600 mg capsule [id number] Yes 1 p.o. after meal 3 times daily x 1 week increase to 2 p.o. 3 times daily Miquel Winkler MD Taking Active   amLODIPine (Norvasc) 2.5 mg tablet [id number] Yes Take 1 tablet (2.5 mg) by mouth once daily. Historical Provider, MD Taking Active   ascorbic acid (Vitamin C) 1,000 mg tablet [id number] Yes Vitamin C Historical Provider, MD Taking Active   ascorbic acid (Vitamin C) 500 mg tablet [id number] Yes Take 1 tablet (500 mg) by mouth once daily. Miquel Winkler MD Taking Active   aspirin 81 mg chewable tablet [id number] Yes Chew once daily. Historical Provider, MD Taking Active   atorvastatin (Lipitor) 20 mg tablet [id number] Yes Take 1 tablet (20 mg) by mouth once daily at bedtime. Historical Provider, MD Taking Active   b complex vitamins capsule [id number] Yes Take 1 capsule by mouth 2 times a day. Miquel Winkler MD Taking Active   CALCIUM 26-VIT D3-MAGNESIUM 15 ORAL [id number] Yes Calcium Historical Provider, MD Taking Active   coenzyme Q-10 200 mg capsule [id number] Yes Take 1 capsule (200 mg) by mouth 3 times a day. Miquel Winkler MD Taking Active   ergocalciferol (Vitamin D-2) 1.25 MG (83589 UT) capsule [id number] Yes Take 1 capsule (1,250 mcg) by mouth 1 (one) time per week. Historical Provider, MD Taking Active   gabapentin (Neurontin) 100 mg capsule [id number] Yes Take 1 capsule (100 mg) by mouth 3 times a day. Titration schedule handed to patient Miquel Winkler MD Taking Active   ibuprofen (AdviL) 200 mg tablet [id number] Yes 1 tablet (200 mg). Historical Provider, MD Taking Active   predniSONE (Deltasone) 10 mg tablet [id number] Yes Please take after breakfast: 6 TABS X 2 DAYS, 5 TABS X 2 DAYS, 4 TABS X 2 DAYS,  3 TABS X  2 DAYS, 2 TABS X 7 DAYS, 1 TAB  X  2 WEEKS Miquel Winkler MD Taking Active   pregabalin (Lyrica) 25 mg capsule [id number] Yes Take 1 capsule (25 mg) by mouth 3 times a day. Discontinue gabapentin, Titration schedule handed to patient Miquel Winkler MD Taking Active   sertraline (Zoloft) 100 mg tablet [id number] Yes Take 1 tablet (100 mg) by mouth once daily. Historical Provider, MD Taking Active   traMADol (Ultram) 50 mg tablet [id number] Yes Take 1 tablet (50 mg) by mouth every 8 hours if needed for severe pain (7 - 10). Miquel Winkler MD Taking Active                    No Known Allergies    Review of Systems   Constitutional:  Negative for chills, fatigue and fever.   Respiratory:  Negative for shortness of breath and wheezing.    Cardiovascular:  Negative for chest pain and leg swelling.   Musculoskeletal: Negative.    Skin: Negative.        Exam   Exam reveals the patient is alert and awake in no acute distress oriented person place and time mood is good neck shoulder elbow on that side and wrist do not show any focal defect from the opposite side with some mild stiffness throughout no skin changes and in the hand there is hypersensitivity in the ring finger only but no skin or color changes in the other digits or severe hypersensitivity he has maintained decent wrist range of motion good pulses sensation throughout except the ring finger and slightly on the dorsum of the long finger.  There is some arthritic changes with some decreased range of motion but there is a fixed 60 degree PIP contracture of the ring finger he lacks 30 degrees of full flexion in that and most of his motion is coming from the MP joint and the DIP joint.  There does appear to be some prominent hardware volarly with palpation but good cap refill otherwise no signs of infection  Assessment   Encounter Diagnosis   Name Primary?    Left hand pain        Plan   I had a long discussion with this patient today and a bone stimulator  most likely will not heal this area and regardless he would have a fixed contracture that is significant and this finger gets in the way significantly with his arthritis we did talk about staged procedure with hardware removal but this can be a long process and most likely would have a stiff finger.  Ray amputation is an option and the patient is leaning toward this long-term because that would give him a much quicker recovery and he has decent function the other digits with some stiffness and with the arthritic change and that would give him pain relief and earlier functional relief without going through multiple procedures with a low likelihood of good results with staged procedures.  Organ to see him back in 4 weeks we will continue therapy to get the other digits moving we will become physician of record.

## 2024-07-12 NOTE — PROGRESS NOTES
"    Occupational Therapy  Occupational Therapy Orthopedic Evaluation    Patient Name: Khurram Nicole  MRN: [id number]  Today's Date: 7/12/2024  Time Calculation  Start Time: 1030  Stop Time: 1115  Time Calculation (min): 45 min      Time:  Time Calculation  Start Time: 1030  Stop Time: 1115  Time Calculation (min): 45 min  OT Evaluation Time Entry  OT Evaluation (Moderate) Time Entry: 20  OT Therapeutic Procedures Time Entry  Therapeutic Exercise Time Entry: 25    Insurance:  Visit number: 1 of 8  Authorization info: 8 authorized visits from 6/25/24-9/1/24  Insurance Type: Payor: [employer] / Plan: [employer] MANAGED [employer]GANIZATION / Product Type: *No Product type* /      General:  Reason for visit: L UE CRPS   Referred by: Jamin    Current Problem  1. Complex regional pain syndrome type 2 of left upper extremity  Follow Up In Occupational Therapy          Precautions: none         Medical History Form: Reviewed (scanned into chart)    Subjective:   Chief Complaint: \"It hurts all the time.\"  Onset: Patient experienced a crush injury at work on 11/6/23. Patient underwent a SX of L LF and RF involving extensor tendon repair of both and an ORIF of RF on 11/9/23.   KEYONNA: Insidious   DOI/DOS: DOI=11/6/23.  DOS=11/9/23      Hand Dominance: Right    Current Condition since injury:   worse     PAIN  Pain Assessment: 0-10  0-10 (Numeric) Pain Score: 8  Pain Type: Chronic pain  Pain Location: Hand  Pain Orientation: Left  Aggravating Factors: Gripping/pinching, Weight Bearing, Opening jars/containers, Lifting/Carrying, Finger/Thumb Flexion, Finger/Thumb extension, Driving, and Dressing/Grooming   Relieving Factors: Rest and Ice     Relevant Information (PMH & Previous Tests/Imaging): Patient has ? Parkinson's DX.   Previous Interventions/Treatments: Physical Therapy/Occupational Therapy OT  was discharged in 1/2024.     Prior Level of Function (PLOF)  Exercise/Physical Activity: Patient reports being independent with all " 24 hr chart check completed    "ADL's prior to injury. Patient enjoys woodworking.  Work/School: Patient was employed as a  for [employer]. Patient is currently on medical leave. Patient lives on a 13 acre piece of property.   Current ADL/IADL Status: All ADL's involving L UE are impaired.      Patients Living Environment: Reviewed and no concern. Patient resides with his wife.     Primary Language: English    Pt goals for therapy: \"Get pain under control.\"    Red Flags: Do you have any of the following? No  Fever/chills, unexplained weight changes, dizziness/fainting, unexplained change in bowel or bladder functions, unexplained malaise or muscle weakness, night pain/sweats, numbness or tingling    Objective:    Patient stated that he is considering a Ray-Resection of L RF.    Wife was present for entire session    Evaluation complexity=Moderate  Rehab prognosis=Fair    Left Hand AROM (degrees)   MCP PIP DIP DPC   IF  0/55 -35/70 0/49 BIB=331   MF 0/68 -40/70 0/45 HFP=872   RF 0/68 -60/77 -26/45 FAD=179   SF 0/70 0/55 0/50 KUL=180   Thumb WFL  WFL    Thumb RABD WFL      Thumb PABD WFL        AROM: L wrist E/F=40/45, UD/RD=22/0, FA sup/pron=47/75   R wrist E/F=50/70, UD/RD=30/15, FA sup/pron=55/75    Physical Observation: Patient has a flexion contracture of L RF PIP joint  Edema: moderated edema throughout L RF    Sensory: tenderness to touch throughout L RF   Numbness/Tingling: none        Outcome Measures:  UEFI: 20/80    EDUCATION: home exercise program, plan of care, activity modifications, pain management, and injury pathology       Goals:  Active       OT Goals       Patient is independent with entire HEP.        Start:  07/12/24    Expected End:  08/11/24            Patient c/o 2/10 or less pain in L hand/wrist/FA with use during ADL's and home exercises.        Start:  07/12/24    Expected End:  08/11/24            AROM: GUIDRY of L IF, LF, and BN=599 or more to assist with ADL completion.        Start:  07/12/24    Expected End: "  08/11/24            L UE AROM is sufficient for independent completion of ADL's and home management.        Start:  07/12/24    Expected End:  09/10/24            L UE strength is sufficient for independent completion of all ADL's and home management.        Start:  07/12/24    Expected End:  09/10/24                Plan of care was developed with input and agreement by the patient    Treatments:     Therapeutic Exercise:   25 min  Therapeutic Exercise  Therapeutic Exercise Performed: Yes  Therapeutic Exercise Activity 1: Wrist E/F and UD/RD x 10 (Issued for HEP)  Therapeutic Exercise Activity 2: FA sup/pron x 10 (Issued for HEP)  Therapeutic Exercise Activity 3: Wrist E/F with FA sup/pron x 10 (Issued for HEP)  Therapeutic Exercise Activity 4: prayer wrist extension x 10 (Issued for HEP)  Therapeutic Exercise Activity 5: Tendon gliding x 10 (Issued for HEP)      Assessment: Patient is a 72 yo male  s/p L hand crush injury with CRPS resulting in limited participation in pain-free ADLs and inability to perform at their prior level of function. Pt would benefit from occupational therapy to address the impairments found & listed previously in the objective section in order to return to safe and pain-free ADLs and prior level of function.       Plan:      Planned Interventions include: therapeutic exercise, therapeutic activity, self-care home management, manual therapy, therapeutic activities, gait training, neuromuscular coordination, vasopneumatic, dry needling, aquatic therapy, electric stimulation, fluidotherapy, ultrasound, kinesiotaping, orthosis fabrication, wound care  Frequency: 1-2 x Week  Duration: 8 Weeks    Viola Villasenor, OT

## 2024-07-12 NOTE — TELEPHONE ENCOUNTER
Patient's wife called. Her  was seen yesterday afternoon. She has some concerns and questions. She wants to know if we can put in the C9 for surgery now and not wait until the patient sees Dr. Quinones again. According to her it [place] a [place] [facility] [place] and her  has been in pain for so long. Also, she wants to double check if the patient doesn't really the need the bone stimulator because it has been approved. She has so many concerns and would love to speak with Dr. Quinones. Jossie can be reached at [phone]. Thank you.

## 2024-07-15 NOTE — TELEPHONE ENCOUNTER
They should come in for appointment with all their concerns and we can discuss everything in the office

## 2024-07-18 ENCOUNTER — TREATMENT (OUTPATIENT)
Dept: OCCUPATIONAL THERAPY | Facility: HOSPITAL | Age: 74
End: 2024-07-18
Payer: COMMERCIAL

## 2024-07-18 ENCOUNTER — APPOINTMENT (OUTPATIENT)
Dept: PAIN MEDICINE | Facility: CLINIC | Age: 74
End: 2024-07-18
Payer: COMMERCIAL

## 2024-07-18 DIAGNOSIS — G56.42 COMPLEX REGIONAL PAIN SYNDROME TYPE 2 OF LEFT UPPER EXTREMITY: ICD-10-CM

## 2024-07-18 PROCEDURE — 97140 MANUAL THERAPY 1/> REGIONS: CPT | Mod: GO | Performed by: OCCUPATIONAL THERAPIST

## 2024-07-18 PROCEDURE — 97110 THERAPEUTIC EXERCISES: CPT | Mod: GO | Performed by: OCCUPATIONAL THERAPIST

## 2024-07-18 NOTE — PROGRESS NOTES
"    Occupational Therapy  Occupational Therapy Orthopedic Treatment    Patient Name: Khurram Nicole  MRN: [id number]  Today's Date: 7/18/2024  Time:  Time Calculation  Start Time: 0945  Stop Time: 1030  Time Calculation (min): 45 min  OT Therapeutic Procedures Time Entry  Manual Therapy Time Entry: 20  Therapeutic Exercise Time Entry: 25      Insurance:  Visit number: 2 of 8  Authorization info: 8 authorized visits from 6/25/24-9/1/24  Insurance Type: Payor: [employer] / Plan: [employer] / Product Type: *No Product type* /      General:  Reason for visit: L UE CRPS   Referred by: Jamin    Current Problem  1. Complex regional pain syndrome type 2 of left upper extremity  Follow Up In Occupational Therapy          Precautions: none         Medical History Form: Reviewed (scanned into chart)    Subjective:   Chief Complaint: \"It feels a little more flexible. It still hurts quite a bit..\"  Onset: Patient experienced a crush injury at work on 11/6/23. Patient underwent a SX of L LF and RF involving extensor tendon repair of both and an ORIF of RF on 11/9/23.   KEYONNA: Insidious   DOI/DOS: DOI=11/6/23.  DOS=11/9/23      Hand Dominance: Right    Current Condition since injury:   worse     PAIN     Aggravating Factors: Gripping/pinching, Weight Bearing, Opening jars/containers, Lifting/Carrying, Finger/Thumb Flexion, Finger/Thumb extension, Driving, and Dressing/Grooming   Relieving Factors: Rest and Ice     Relevant Information (PMH & Previous Tests/Imaging): Patient has ? Parkinson's DX.   Previous Interventions/Treatments: Physical Therapy/Occupational Therapy OT  was discharged in 1/2024.     Prior Level of Function (PLOF)  Exercise/Physical Activity: Patient reports being independent with all ADL's prior to injury. Patient enjoys woodworking.  Work/School: Patient was employed as a  for [employer]. Patient is currently on medical leave. Patient lives on a 13 acre piece of property. " "  Current ADL/IADL Status: All ADL's involving L UE are impaired.      Patients Living Environment: Reviewed and no concern. Patient resides with his wife.     Primary Language: English    Pt goals for therapy: \"Get pain under control.\"    Red Flags: Do you have any of the following? No  Fever/chills, unexplained weight changes, dizziness/fainting, unexplained change in bowel or bladder functions, unexplained malaise or muscle weakness, night pain/sweats, numbness or tingling    Objective:    Patient stated that he is considering a Ray-Resection of L RF.    Wife was present for entire session    Evaluation complexity=Moderate  Rehab prognosis=Fair    Left Hand AROM (degrees)   MCP PIP DIP DPC   IF  0/55 -35/70 0/49 BFT=969   MF 0/68 -40/75 was 70 0/45 JVV=357   RF 0/68 -60/77 -26/45 USV=580   SF 0/70 0/70 was 55 0/50 YND=307   Thumb WFL  WFL    Thumb RABD WFL      Thumb PABD WFL        AROM: L wrist E/F=40/45, UD/RD=22/0, FA sup/pron=47/75   R wrist E/F=50/70, UD/RD=30/15, FA sup/pron=55/75    Physical Observation: Patient has a flexion contracture of L RF PIP joint  Edema: moderated edema throughout L RF    Sensory: tenderness to touch throughout L RF   Numbness/Tingling: none        Outcome Measures:  UEFI: 20/80    EDUCATION: home exercise program, plan of care, activity modifications, pain management, and injury pathology       Goals:  Active       OT Goals       Patient is independent with entire HEP.        Start:  07/12/24    Expected End:  08/11/24            Patient c/o 2/10 or less pain in L hand/wrist/FA with use during ADL's and home exercises.        Start:  07/12/24    Expected End:  08/11/24            AROM: GUIDRY of L IF, LF, and YG=968 or more to assist with ADL completion.        Start:  07/12/24    Expected End:  08/11/24            L UE AROM is sufficient for independent completion of ADL's and home management.        Start:  07/12/24    Expected End:  09/10/24            L UE strength is " sufficient for independent completion of all ADL's and home management.        Start:  07/12/24    Expected End:  09/10/24                Plan of care was developed with input and agreement by the patient    Treatments:     Therapeutic Exercise:   25 min   Bead pickup with in hand manipulation and transfer.  Active hook fist, straight fist, composite fist, and intrinsic plus.  DIP and PIP blocking.  AAROM wrist extension and flexion on ball.  AAROM wrist ulnar and radial deviation on ball.  AAROM forearm pronation and supination on ball.   Wrist maze.  Digit extension slides on towel.  Active tenodesis, deviation, and forearm pronation/supination.  Thumb opposition slides.  Active digit adduction with pink sponge pieces and abduction with rubber band.  Wrist extension and flexion stretches.    Manual therapy  20 min   Therapist performed manual wrist and digit flexion/extension stretches to improve ROM.    Assessment:   ROM improved. Patient reports good compliance with home program. Patient reports that pain is unchanged. Patient to follow up next week.  Plan:      Planned Interventions include: therapeutic exercise, therapeutic activity, self-care home management, manual therapy, therapeutic activities, gait training, neuromuscular coordination, vasopneumatic, dry needling, aquatic therapy, electric stimulation, fluidotherapy, ultrasound, kinesiotaping, orthosis fabrication, wound care  Frequency: 1-2 x Week  Duration: 8 Weeks    Azeem Taveras OT

## 2024-07-22 ENCOUNTER — TREATMENT (OUTPATIENT)
Dept: OCCUPATIONAL THERAPY | Facility: HOSPITAL | Age: 74
End: 2024-07-22
Payer: COMMERCIAL

## 2024-07-22 DIAGNOSIS — G56.42 COMPLEX REGIONAL PAIN SYNDROME TYPE 2 OF LEFT UPPER EXTREMITY: ICD-10-CM

## 2024-07-22 PROCEDURE — 97110 THERAPEUTIC EXERCISES: CPT | Mod: GO | Performed by: OCCUPATIONAL THERAPIST

## 2024-07-22 ASSESSMENT — PAIN SCALES - GENERAL: PAINLEVEL_OUTOF10: 6

## 2024-07-22 ASSESSMENT — PAIN - FUNCTIONAL ASSESSMENT: PAIN_FUNCTIONAL_ASSESSMENT: 0-10

## 2024-07-22 NOTE — PROGRESS NOTES
"    Occupational Therapy  Occupational Therapy Treatment    Patient Name: Khurram Nicole  MRN: [id number]  Today's Date: 7/22/2024    Time:  Time Calculation  Start Time: 1545  Stop Time: 1630  Time Calculation (min): 45 min  OT Therapeutic Procedures Time Entry  Therapeutic Exercise Time Entry: 45    Insurance:  Visit number: 3 of 8  Authorization info: 8 visits through 9/1/24   Insurance Type: Payor: [employer] / Plan: [employer]ATION / Product Type: *No Product type* /    General:  Reason for visit: L UE CRPS  Referred by: Jamin    Current Problem  1. Complex regional pain syndrome type 2 of left upper extremity  Follow Up In Occupational Therapy          Precautions   none      Subjective:   Patient reports \"I see  again on August 8th.\"    Performing HEP?: Yes    Pain  Pain Assessment: 0-10  0-10 (Numeric) Pain Score: 6  Pain Type: Chronic pain  Pain Location: Hand  Pain Orientation: Left  Post-tx pain=    Objective:     AROM: L FA sup/pron=55/75    Physical Observation: PIP flexion contracture is noted at RF    Sensory: intact   Numbness/Tingling: none    Treatments:     Modalities:        Modalities  Modalities Used: Yes  Modality 1: Untimed Fluidotherapy with AROM promotion     Therapeutic Exercise:   45 min  Therapeutic Exercise  Therapeutic Exercise Performed: Yes  Therapeutic Exercise Activity 1: Wrist E/F and UD/RD x 10 reps each  Therapeutic Exercise Activity 2: FA sup/pron x 10 reps with and without a flory hammer  Therapeutic Exercise Activity 3: Wrist E/F with FA sup/pron x 10 reps each  Therapeutic Exercise Activity 4: prayer wrist extension x 10  Therapeutic Exercise Activity 5: tendon gliding x 10 reps each      Assessment: Improvement is noted with L FA supination this p.m. Improved flexibility is noted throughout all digits of L hand to assist with functional .        Plan: Continue with plan of care 1-2x/wk.        Viola Vlilasenor, OT  "

## 2024-07-26 ENCOUNTER — TREATMENT (OUTPATIENT)
Dept: OCCUPATIONAL THERAPY | Facility: HOSPITAL | Age: 74
End: 2024-07-26
Payer: COMMERCIAL

## 2024-07-26 DIAGNOSIS — G56.42 COMPLEX REGIONAL PAIN SYNDROME TYPE 2 OF LEFT UPPER EXTREMITY: ICD-10-CM

## 2024-07-26 PROCEDURE — 97110 THERAPEUTIC EXERCISES: CPT | Mod: GO | Performed by: OCCUPATIONAL THERAPIST

## 2024-07-26 ASSESSMENT — PAIN SCALES - GENERAL: PAINLEVEL_OUTOF10: 7

## 2024-07-26 ASSESSMENT — PAIN - FUNCTIONAL ASSESSMENT: PAIN_FUNCTIONAL_ASSESSMENT: 0-10

## 2024-07-26 NOTE — PROGRESS NOTES
"    Occupational Therapy  Occupational Therapy Treatment    Patient Name: Khurram Nicole  MRN: [id number]  Today's Date: 7/26/2024  Time Calculation  Start Time: 1030  Stop Time: 1115  Time Calculation (min): 45 min        Time:  Time Calculation  Start Time: 1030  Stop Time: 1115  Time Calculation (min): 45 min  OT Therapeutic Procedures Time Entry  Therapeutic Exercise Time Entry: 45    Insurance:  Visit number: 4 of 8  Authorization info: 8 authorized visits from 6/25-9/1/24  Insurance Type: Payor: [employer] / Plan: [employer]ATION / Product Type: *No Product type* /    General:  Reason for visit: L UE CRPS  Referred by: Jamin     Current Problem  1. Complex regional pain syndrome type 2 of left upper extremity  Follow Up In Occupational Therapy          Precautions   none      Subjective:   Patient reports \"I am more stiff this morning.\"    Performing HEP?: Yes    Pain  Pain Assessment: 0-10  0-10 (Numeric) Pain Score: 7  Pain Type: Chronic pain  Pain Location: Hand  Pain Orientation: Left  Post-tx pain=    Objective:     AROM: L wrist E/F=60/62    Physical Observation: flexion contracture continues at L RF PIP joint   Edema: minimal throughout L RF     Sensory: intact   Numbness/Tingling: none    Treatments:     Modalities:        Modalities  Modalities Used: Yes  Modality 1: Untimed Fluidotherapy with AROM promotion     Therapeutic Exercise:   45 min  Therapeutic Exercise  Therapeutic Exercise Performed: Yes  Therapeutic Exercise Activity 1: Wrist E.F and UD/RD x 10 reps each  Therapeutic Exercise Activity 2: FA sup/pron x 10 reps with and without a flory hammer  Therapeutic Exercise Activity 3: Wrist E/F with FA sup/pron x 10 reps with and without a 1# weight  Therapeutic Exercise Activity 4: prayer wrist extension x 10  Therapeutic Exercise Activity 5: tendon gliding x 10  Therapeutic Exercise Activity 6: place and hold flexion of all digits of L hand x 10 reps      Assessment: Progress " is noted with L wrist extension and flexion this a.m.        Plan: continue with plan of care 1-2x/wk.        Viola Villasenor OT

## 2024-07-29 ENCOUNTER — TREATMENT (OUTPATIENT)
Dept: OCCUPATIONAL THERAPY | Facility: HOSPITAL | Age: 74
End: 2024-07-29
Payer: COMMERCIAL

## 2024-07-29 DIAGNOSIS — G56.42 COMPLEX REGIONAL PAIN SYNDROME TYPE 2 OF LEFT UPPER EXTREMITY: ICD-10-CM

## 2024-07-29 PROCEDURE — 97110 THERAPEUTIC EXERCISES: CPT | Mod: GO | Performed by: OCCUPATIONAL THERAPIST

## 2024-07-29 ASSESSMENT — PAIN - FUNCTIONAL ASSESSMENT: PAIN_FUNCTIONAL_ASSESSMENT: 0-10

## 2024-07-29 ASSESSMENT — PAIN SCALES - GENERAL: PAINLEVEL_OUTOF10: 7

## 2024-07-29 NOTE — PROGRESS NOTES
"    Occupational Therapy  Occupational Therapy Treatment    Patient Name: Khurram Nicole  MRN: [id number]  Today's Date: 7/29/2024  Time Calculation  Start Time: 1030  Stop Time: 1125  Time Calculation (min): 55 min        Time:  Time Calculation  Start Time: 1030  Stop Time: 1125  Time Calculation (min): 55 min  OT Therapeutic Procedures Time Entry  Therapeutic Exercise Time Entry: 55    Insurance:  Visit number: 5 of 8  Authorization info: 8 authorized visits from 6/25-9/1/24  Insurance Type: Payor: [employer] / Plan: [employer]GANIZATION / Product Type: *No Product type* /    General:  Reason for visit: L UE CRPS  Referred by: Jamin    Current Problem  1. Complex regional pain syndrome type 2 of left upper extremity  Follow Up In Occupational Therapy          Precautions   none      Subjective:   Patient reports \"My pain is a constant 7.\"    Performing HEP?: Yes    Pain  Pain Assessment: 0-10  0-10 (Numeric) Pain Score: 7  Pain Type: Chronic pain  Pain Location: Finger (Comment which one)  Pain Orientation: Left  Pain Radiating Towards: RF  Post-tx pain=7/10    Objective:     AROM: L wrist E/F=70/62    Physical Observation: flexion contracture is still noted at L RF PIP joint   Edema: moderate throughout RF     Sensory: intact   Numbness/Tingling: none    Treatments:     Modalities:        Modalities  Modalities Used: Yes  Modality 1: Untimed Hotpack with extension promotion     Therapeutic Exercise:   55 min  Therapeutic Exercise  Therapeutic Exercise Performed: Yes  Therapeutic Exercise Activity 1: wrist E/F with FA sup/pron x 15 reps with and without a 1# weight  Therapeutic Exercise Activity 2: FA sup/pron x 15 reps with a light flory  Therapeutic Exercise Activity 3: Place and hold flexion and extension of all L digits #2-5 x 10 reps  Therapeutic Exercise Activity 4: Prayer wrist extension x 10 reps  Therapeutic Exercise Activity 5: Digiflex x 10 reps each with yellow-black        Assessment: " Improvement is noted with functional wrist extension and flexion this a.m.        Plan: continue with plan of care 1-2x/wk       Viola Villasenor OT

## 2024-08-02 ENCOUNTER — APPOINTMENT (OUTPATIENT)
Dept: OCCUPATIONAL THERAPY | Facility: HOSPITAL | Age: 74
End: 2024-08-02
Payer: COMMERCIAL

## 2024-08-02 ENCOUNTER — TREATMENT (OUTPATIENT)
Dept: OCCUPATIONAL THERAPY | Facility: HOSPITAL | Age: 74
End: 2024-08-02
Payer: COMMERCIAL

## 2024-08-02 DIAGNOSIS — G56.42 COMPLEX REGIONAL PAIN SYNDROME TYPE 2 OF LEFT UPPER EXTREMITY: ICD-10-CM

## 2024-08-02 PROCEDURE — 97110 THERAPEUTIC EXERCISES: CPT | Mod: GO | Performed by: OCCUPATIONAL THERAPIST

## 2024-08-02 ASSESSMENT — PAIN SCALES - GENERAL: PAINLEVEL_OUTOF10: 7

## 2024-08-02 ASSESSMENT — PAIN - FUNCTIONAL ASSESSMENT: PAIN_FUNCTIONAL_ASSESSMENT: 0-10

## 2024-08-02 NOTE — PROGRESS NOTES
"    Occupational Therapy  Occupational Therapy Treatment    Patient Name: Khurram Nicole  MRN: [id number]  Today's Date: 8/2/2024  Time Calculation  Start Time: 1030  Stop Time: 1125  Time Calculation (min): 55 min        Time:  Time Calculation  Start Time: 1030  Stop Time: 1125  Time Calculation (min): 55 min  OT Therapeutic Procedures Time Entry  Therapeutic Exercise Time Entry: 55    Insurance:  Visit number: 6 of 8  Authorization info: 8 approved visits from 6/25-9/1/24  Insurance Type: Payor: [employer] / Plan: [employer]ATION / Product Type: *No Product type* /    General:  Reason for visit: L UE CRPS  Referred by: Jamin    Current Problem  1. Complex regional pain syndrome type 2 of left upper extremity  Follow Up In Occupational Therapy          Precautions   none      Subjective:   Patient reports \"I am more stiff today.\"    Performing HEP?: Yes    Pain  Pain Assessment: 0-10  0-10 (Numeric) Pain Score: 7  Pain Type: Chronic pain  Pain Location: Finger (Comment which one)  Pain Orientation: Left  Pain Radiating Towards: entire L hand  Post-tx pain=7/10    Objective:     AROM: Lags to DPC of IF=0, LF=0, and SF=0    Physical Observation: flexion contracture is still noted at PIP joint of RF   Edema: minimal in circumferential RF     Sensory: intact   Numbness/Tingling: none    Treatments:     Modalities:        Modalities  Modalities Used: Yes  Modality 1: Untimed Hotpack with extension promotion    Therapeutic Exercise:   55 min  Therapeutic Exercise  Therapeutic Exercise Performed: Yes  Therapeutic Exercise Activity 1: Wrist E/F and FA AROM x 10 reps each  Therapeutic Exercise Activity 2: wrist E/F with FA sup/pron x 15 reps with use of 1# weight  Therapeutic Exercise Activity 3: place and hold flexion and extension of digits of L hand x 10 reps each  Therapeutic Exercise Activity 4: prayer wrist extension x 15 reps each  Therapeutic Exercise Activity 5: digiflex x 20 reps each with " all      Assessment: Improved functional AROM is noted at L digits #2, 3, and 5.       Plan: Continue with plan of care 1-2x/wk55       Viola Villasenor, OT

## 2024-08-05 ENCOUNTER — TREATMENT (OUTPATIENT)
Dept: OCCUPATIONAL THERAPY | Facility: HOSPITAL | Age: 74
End: 2024-08-05
Payer: COMMERCIAL

## 2024-08-05 ENCOUNTER — APPOINTMENT (OUTPATIENT)
Dept: OCCUPATIONAL THERAPY | Facility: HOSPITAL | Age: 74
End: 2024-08-05
Payer: COMMERCIAL

## 2024-08-05 DIAGNOSIS — G56.42 COMPLEX REGIONAL PAIN SYNDROME TYPE 2 OF LEFT UPPER EXTREMITY: ICD-10-CM

## 2024-08-05 PROCEDURE — 97110 THERAPEUTIC EXERCISES: CPT | Mod: GO | Performed by: OCCUPATIONAL THERAPIST

## 2024-08-05 ASSESSMENT — PAIN SCALES - GENERAL: PAINLEVEL_OUTOF10: 7

## 2024-08-05 ASSESSMENT — PAIN - FUNCTIONAL ASSESSMENT: PAIN_FUNCTIONAL_ASSESSMENT: 0-10

## 2024-08-05 NOTE — PROGRESS NOTES
"    Occupational Therapy  Occupational Therapy Treatment    Patient Name: Khurram Nicole  MRN: [id number]  Today's Date: 8/5/2024  Time Calculation  Start Time: 1030  Stop Time: 1115  Time Calculation (min): 45 min      Time:  Time Calculation  Start Time: 1030  Stop Time: 1115  Time Calculation (min): 45 min  OT Therapeutic Procedures Time Entry  Therapeutic Exercise Time Entry: 45    Insurance:  Visit number: 7 of 8  Authorization info: 8 approved visits for C9  Insurance Type: Payor: [employer] / Plan: [employer]GANIZATION / Product Type: *No Product type* /    General:  Reason for visit: L UE CRPS   Referred by: Jamin     Current Problem  1. Complex regional pain syndrome type 2 of left upper extremity  Follow Up In Occupational Therapy          Precautions   none      Subjective:   Patient reports \"I think it is more flexible at home too.\"      Performing HEP?: Yes    Pain  Pain Assessment: 0-10  0-10 (Numeric) Pain Score: 7  Pain Type: Chronic pain  Pain Location: Finger (Comment which one)  Pain Orientation: Left  Pain Radiating Towards: all digits of L hand  Post tx pain=6/10    Objective:     AROM: L FA sup/pron=65/80    Physical Observation: PIP flexion contracture continues at RF PIP joint   Edema: moderate throughout circumferential L RF     Sensory: intact   Numbness/Tingling: none    Treatments:     Modalities:        Modalities  Modalities Used: Yes  Modality 1: Untimed Hotpack with digit extension promotion     Therapeutic Exercise:   45 min  Therapeutic Exercise  Therapeutic Exercise Performed: Yes  Therapeutic Exercise Activity 1: wrist and FA AROM x 15 reps each  Therapeutic Exercise Activity 2: place and hold flexion and extension x 10 reps each  Therapeutic Exercise Activity 3: digiflex x 20 reps each  Therapeutic Exercise Activity 4: prayer wrist extesion x 10 reps      Assessment: Progress is noted with FA supination to assist with functional activity completion.        Plan: " continue with plan of care 1-2x/wk.        Viola Villasenor, OT

## 2024-08-08 ENCOUNTER — OFFICE VISIT (OUTPATIENT)
Dept: ORTHOPEDIC SURGERY | Facility: HOSPITAL | Age: 74
End: 2024-08-08
Payer: COMMERCIAL

## 2024-08-08 ENCOUNTER — APPOINTMENT (OUTPATIENT)
Dept: OCCUPATIONAL THERAPY | Facility: HOSPITAL | Age: 74
End: 2024-08-08
Payer: COMMERCIAL

## 2024-08-08 DIAGNOSIS — S62.615B OPEN DISPLACED FRACTURE OF PROXIMAL PHALANX OF LEFT RING FINGER, INITIAL ENCOUNTER: Primary | ICD-10-CM

## 2024-08-08 PROCEDURE — 1159F MED LIST DOCD IN RCRD: CPT | Performed by: ORTHOPAEDIC SURGERY

## 2024-08-08 PROCEDURE — 1125F AMNT PAIN NOTED PAIN PRSNT: CPT | Performed by: ORTHOPAEDIC SURGERY

## 2024-08-08 PROCEDURE — 99213 OFFICE O/P EST LOW 20 MIN: CPT | Performed by: ORTHOPAEDIC SURGERY

## 2024-08-08 PROCEDURE — 1160F RVW MEDS BY RX/DR IN RCRD: CPT | Performed by: ORTHOPAEDIC SURGERY

## 2024-08-08 PROCEDURE — 1036F TOBACCO NON-USER: CPT | Performed by: ORTHOPAEDIC SURGERY

## 2024-08-08 RX ORDER — CEFAZOLIN SODIUM 2 G/100ML
2 INJECTION, SOLUTION INTRAVENOUS ONCE
OUTPATIENT
Start: 2024-08-08 | End: 2024-08-08

## 2024-08-08 RX ORDER — SODIUM CHLORIDE, SODIUM LACTATE, POTASSIUM CHLORIDE, CALCIUM CHLORIDE 600; 310; 30; 20 MG/100ML; MG/100ML; MG/100ML; MG/100ML
100 INJECTION, SOLUTION INTRAVENOUS CONTINUOUS
OUTPATIENT
Start: 2024-08-08

## 2024-08-08 ASSESSMENT — PAIN - FUNCTIONAL ASSESSMENT: PAIN_FUNCTIONAL_ASSESSMENT: 0-10

## 2024-08-08 ASSESSMENT — PAIN SCALES - GENERAL: PAINLEVEL_OUTOF10: 8

## 2024-08-09 ENCOUNTER — TREATMENT (OUTPATIENT)
Dept: OCCUPATIONAL THERAPY | Facility: HOSPITAL | Age: 74
End: 2024-08-09
Payer: COMMERCIAL

## 2024-08-09 DIAGNOSIS — G56.42 COMPLEX REGIONAL PAIN SYNDROME TYPE 2 OF LEFT UPPER EXTREMITY: ICD-10-CM

## 2024-08-09 PROCEDURE — 97140 MANUAL THERAPY 1/> REGIONS: CPT | Mod: GO | Performed by: OCCUPATIONAL THERAPIST

## 2024-08-09 PROCEDURE — 97110 THERAPEUTIC EXERCISES: CPT | Mod: GO | Performed by: OCCUPATIONAL THERAPIST

## 2024-08-09 ASSESSMENT — PAIN - FUNCTIONAL ASSESSMENT: PAIN_FUNCTIONAL_ASSESSMENT: 0-10

## 2024-08-09 ASSESSMENT — PAIN SCALES - GENERAL: PAINLEVEL_OUTOF10: 8

## 2024-08-09 NOTE — PROGRESS NOTES
"    Occupational Therapy  Occupational Therapy Treatment    Patient Name: Khurram Nicole  MRN: [id number]  Today's Date: 8/9/2024  Time Calculation  Start Time: 1030  Stop Time: 1123  Time Calculation (min): 53 min    Time:  Time Calculation  Start Time: 1030  Stop Time: 1123  Time Calculation (min): 53 min  OT Therapeutic Procedures Time Entry  Manual Therapy Time Entry: 8  Therapeutic Exercise Time Entry: 45    Insurance:  Visit number: 8 of 8  Authorization info: [facility] extension request   Insurance Type: Payor: [employer] / Plan: [employer]GANIZATION / Product Type: *No Product type* /    General:  Reason for visit: L UE CRPS  Referred by: Jamin    Current Problem  1. Complex regional pain syndrome type 2 of left upper extremity  Follow Up In Occupational Therapy          Precautions   None       Subjective:   Patient reports \"I saw  and they put the request in for my surgery.\"(RE:L     Performing HEP?: Yes    Pain  Pain Assessment: 0-10  0-10 (Numeric) Pain Score: 8  Pain Type: Chronic pain  Pain Location: Finger (Comment which one)  Pain Orientation: Left  Pain Radiating Towards: all digits of L UE  Post-tx pain=5/10    Objective:   AROM: No lags to DPC of L IF, LF, and SF  Physical Observation: PIP flexion contracture is noted of L RF.     Edema: none    Sensory: intact   Numbness/Tingling: none    Treatments:     Modalities:        Modalities  Modalities Used: Yes  Modality 1: Untimed Hotpack with AROM promotion     Therapeutic Exercise:   45 min  Therapeutic Exercise  Therapeutic Exercise Performed: Yes  Therapeutic Exercise Activity 1: wrist and FA  AROM x 15 reps each  Therapeutic Exercise Activity 2: place and hold digit flexion and extension x 10 reps each  Therapeutic Exercise Activity 3: wrist E/F with FA sup/pron x 10 reps each with 1# weight  Therapeutic Exercise Activity 4: digiflex x 20 reps each with all  Therapeutic Exercise Activity 5: prayer wrist extension x 10    Manual " Therapy:     8 min  Manual Therapy  Manual Therapy Performed: Yes  Manual Therapy Activity 1: Soft tissue mobilization to dorsal and volar FA with use of Biofreeze.      Assessment: Improvement is noted with ease of functional AROM this a.m.        Plan: Continue with plan of care 1-2x/wk       Viola Villasenor OT

## 2024-08-09 NOTE — PROGRESS NOTES
Reason for Appointment  Chief Complaint   Patient presents with    Left Hand - Pain, Follow-up     History of Present Illness  Patient is a 73 y.o. male here today for follow-up evaluation of Continued left ring finger pain, has had a long ordeal with continued pain and internal fixation with a nonunion, he has pain in the digit it is significantly getting in his way for all activities of daily living, after long discussion today he is not interested in numerous procedures with a low likelihood of improved outcome, he is not worried about cosmetic deformity and the main goal is function and pain relief and after a long in-depth discussion, he would like to go ahead with ray amputation of the left ring finger and we will place a C9 for this procedure.  We will keep him off on a [employer] over the next 3 months.  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 No pertinent past medical history.    History reviewed. No pertinent surgical history.    Medication Documentation Review Audit       Reviewed by Rose Marie Bal MA (Medical Assistant) on 08/08/24 at 1514      Medication Order Taking? Sig Documenting Provider Last Dose Status   acetaminophen (Tylenol) 325 mg tablet [id number] Yes Take 2 tablets (650 mg) by mouth every 6 hours if needed. Historical Provider, MD Taking Active   alpha lipoic acid 600 mg capsule [id number] Yes 1 p.o. after meal 3 times daily x 1 week increase to 2 p.o. 3 times daily Miquel Winkler MD Taking Active   amLODIPine (Norvasc) 2.5 mg tablet [id number] Yes Take 1 tablet (2.5 mg) by mouth once daily. Historical Provider, MD Taking Active   ascorbic acid (Vitamin C) 1,000 mg tablet [id number] Yes Vitamin C  Historical Provider, MD Taking Active   ascorbic acid (Vitamin C) 500 mg tablet [id number] Yes Take 1 tablet (500 mg) by mouth once daily. Miquel Winkler MD Taking Active   aspirin 81 mg chewable tablet [id number] Yes Chew once daily. Jcarlos Provider, MD Taking Active   atorvastatin (Lipitor) 20 mg tablet [id number] Yes Take 1 tablet (20 mg) by mouth once daily at bedtime. Jcarlos Provider, MD Taking Active   b complex vitamins capsule [id number] Yes Take 1 capsule by mouth 2 times a day. Miquel Winkler MD Taking Active   CALCIUM 26-VIT D3-MAGNESIUM 15 ORAL [id number] Yes Calcium Historical Provider, MD Taking Active   coenzyme Q-10 200 mg capsule [id number] Yes Take 1 capsule (200 mg) by mouth 3 times a day. Miquel Winkler MD Taking Active   ergocalciferol (Vitamin D-2) 1.25 MG (75525 UT) capsule [id number] Yes Take 1 capsule (1,250 mcg) by mouth 1 (one) time per week. Jcarlos Provider, MD Taking Active   gabapentin (Neurontin) 100 mg capsule [id number] Yes Take 1 capsule (100 mg) by mouth 3 times a day. Titration schedule handed to patient Miquel Winkler MD Taking Active   ibuprofen (AdviL) 200 mg tablet [id number] Yes 1 tablet (200 mg). Historical Provider, MD Taking Active   predniSONE (Deltasone) 10 mg tablet [id number] Yes Please take after breakfast: 6 TABS X 2 DAYS, 5 TABS X 2 DAYS, 4 TABS X 2 DAYS, 3 TABS X  2 DAYS, 2 TABS X 7 DAYS, 1 TAB  X  2 WEEKS Miquel Winkler MD Taking Active   pregabalin (Lyrica) 25 mg capsule [id number] Yes Take 1 capsule (25 mg) by mouth 3 times a day. Discontinue gabapentin, Titration schedule handed to patient Miquel Winkler MD Taking Active   sertraline (Zoloft) 100 mg tablet [id number] Yes Take 1 tablet (100 mg) by mouth once daily. Jcarlos Cooper MD Taking Active   traMADol (Ultram) 50 mg tablet [id number] Yes Take 1 tablet (50 mg) by mouth every 8 hours if needed for severe pain (7 - 10). Miquel Winkler MD Taking Active                    No Known Allergies    Review  of Systems  Unchanged  Exam   Exam reveals a fixed contracture of the left ring finger PIP joint and he lacks significant composite flexion due to this getting in the way tender over the PIP joint but no other significant hypersensitivity or skin or color changes in the other digits but there is some arthritic change with about 20 degrees lack of composite flexion when looking at those digits good pulses sensation otherwise without significant hypersensitivity in the other digits except some mild hypersensitivity in the ring finger  Assessment   Encounter Diagnosis   Name Primary?    Open displaced fracture of proximal phalanx of left ring finger, initial encounter Yes       Plan   Plan will be for surgical intervention as described above in the [employer] extension for 3 months

## 2024-08-12 ENCOUNTER — TREATMENT (OUTPATIENT)
Dept: OCCUPATIONAL THERAPY | Facility: HOSPITAL | Age: 74
End: 2024-08-12
Payer: COMMERCIAL

## 2024-08-12 ENCOUNTER — APPOINTMENT (OUTPATIENT)
Dept: OCCUPATIONAL THERAPY | Facility: HOSPITAL | Age: 74
End: 2024-08-12
Payer: COMMERCIAL

## 2024-08-12 DIAGNOSIS — G56.42 COMPLEX REGIONAL PAIN SYNDROME TYPE 2 OF LEFT UPPER EXTREMITY: Primary | ICD-10-CM

## 2024-08-12 PROCEDURE — 97110 THERAPEUTIC EXERCISES: CPT | Mod: GO | Performed by: OCCUPATIONAL THERAPIST

## 2024-08-12 PROCEDURE — 97140 MANUAL THERAPY 1/> REGIONS: CPT | Mod: GO | Performed by: OCCUPATIONAL THERAPIST

## 2024-08-12 ASSESSMENT — PAIN - FUNCTIONAL ASSESSMENT: PAIN_FUNCTIONAL_ASSESSMENT: 0-10

## 2024-08-12 ASSESSMENT — PAIN SCALES - GENERAL: PAINLEVEL_OUTOF10: 8

## 2024-08-12 NOTE — PROGRESS NOTES
"    Occupational Therapy  Occupational Therapy Treatment    Patient Name: Khurram Nicole  MRN: [id number]  Today's Date: 8/12/2024  Time Calculation  Start Time: 1030  Stop Time: 1115  Time Calculation (min): 45 min        Time:  Time Calculation  Start Time: 1030  Stop Time: 1115  Time Calculation (min): 45 min  OT Therapeutic Procedures Time Entry  Manual Therapy Time Entry: 8  Therapeutic Exercise Time Entry: 37    Insurance:  Visit number: 9 of 20  Authorization info: 12 additional visits were approved from 8/11-9/30/24  Insurance Type: Payor: [employer]I / Plan: [employer] / Product Type: *No Product type* /    General:  Reason for visit: L UE CRPS  Referred by: Jamin    Current Problem  1. Complex regional pain syndrome type 2 of left upper extremity  Follow Up In Occupational Therapy    Follow Up In Occupational Therapy          Precautions   none      Subjective:   Patient reports \"The doctor submitted for the approval on my amputation surgery.\"    Performing HEP?: Yes    Pain  Pain Assessment: 0-10  0-10 (Numeric) Pain Score: 8  Pain Type: Chronic pain  Pain Location: Finger (Comment which one)  Pain Orientation: Left  Pain Radiating Towards: all digits of L UE  Post-tx pain=5/10    Objective:     AROM: no lags to DPC of IF, LF, and SF following tx.    Physical Observation: flexion contracture of PIP joint of RF continues   Edema: none    Sensory: intact  Numbness/Tingling: none    Treatments:     Modalities:        Modalities  Modalities Used: Yes  Modality 1: Untimed Hotpack with digit extension promotion    Therapeutic Exercise:   37 min  Therapeutic Exercise  Therapeutic Exercise Performed: Yes  Therapeutic Exercise Activity 1: wrist and FA AROM x 15 reps each with and without use of a 1# weight  Therapeutic Exercise Activity 2: place and hold flexion and extension of digits #2, 3, and #5  Therapeutic Exercise Activity 3: flory FA sup/pron x 15 reps  Therapeutic Exercise " Activity 4: digiflex x 20 reps each with all  Therapeutic Exercise Activity 5: juxacizer x 5 reps  Therapeutic Exercise Activity 6: dowel norm wind up/down x 5 reps with use of !# weight    Manual Therapy:     8 min  Manual Therapy  Manual Therapy Performed: Yes  Manual Therapy Activity 1: soft tissue mobilization to dorsal and volar FA's with use of Biofreeze      Assessment: Progress is noted with TX tolerance and increased resistance this a.m.        Plan: Continue with plan of care 1-2x/wk       Viola Villasenor OT

## 2024-08-15 ENCOUNTER — APPOINTMENT (OUTPATIENT)
Dept: OCCUPATIONAL THERAPY | Facility: HOSPITAL | Age: 74
End: 2024-08-15
Payer: COMMERCIAL

## 2024-08-16 ENCOUNTER — TREATMENT (OUTPATIENT)
Dept: OCCUPATIONAL THERAPY | Facility: HOSPITAL | Age: 74
End: 2024-08-16
Payer: COMMERCIAL

## 2024-08-16 DIAGNOSIS — G56.42 COMPLEX REGIONAL PAIN SYNDROME TYPE 2 OF LEFT UPPER EXTREMITY: ICD-10-CM

## 2024-08-16 PROCEDURE — 97110 THERAPEUTIC EXERCISES: CPT | Mod: GO | Performed by: OCCUPATIONAL THERAPIST

## 2024-08-16 ASSESSMENT — PAIN - FUNCTIONAL ASSESSMENT: PAIN_FUNCTIONAL_ASSESSMENT: 0-10

## 2024-08-16 ASSESSMENT — PAIN SCALES - GENERAL: PAINLEVEL_OUTOF10: 7

## 2024-08-16 NOTE — PROGRESS NOTES
"    Occupational Therapy  Occupational Therapy Orthopedic Progress Note    Patient Name: Khurram Nicole  MRN: [id number]  Today's Date: 8/16/2024    Time:  Time Calculation  Start Time: 1245  Stop Time: 1330  Time Calculation (min): 45 min  OT Therapeutic Procedures Time Entry  Therapeutic Exercise Time Entry: 45    Insurance:  Visit number: 10 of 20  Authorization info: 20 visits approved through 9/30/24  Insurance Type: Payor: [employer]I / Plan: [employer] / Product Type: *No Product type* /      General:  Reason for visit: L UE CRPS  Referred by: Jamin    Current Problem  1. Complex regional pain syndrome type 2 of left upper extremity  Follow Up In Occupational Therapy          Precautions: none         Subjective:  Patient reports \"I still have a 7/10 pain.\"    Current Condition:  Better    Pain:  Pain Assessment: 0-10  0-10 (Numeric) Pain Score: 7  Pain Type: Chronic pain  Pain Location: Finger (Comment which one)  Pain Orientation: Left  Pain Radiating Towards: all digits of L UE  Post-tx =2/10  Relieving Factors:  Rest and Heat     Self Reported Function (0-100%) = 75  - 100% being back to PLOF    Objective:    AROM: L IF MP=0/80, PIP=0/102, DIP=0/75, FAA=838   L LF MP=0/90, PIP=-27/102, DIP=0/72, VMB=546   L SF MP=0/90, PIP=0/100, DIP=0/80, GMW=215     Wrist E/F=70/65, FA sup/pron=64/75, UD/RD=25/20    Outcome Measures: Updated 8/16/2024  UEFI: 52/80    EDUCATION: home exercise program, plan of care, activity modifications, pain management, and injury pathology       Goals: Updated 8/16/2024  Active       OT Goals       Patient is independent with entire HEP.  (Met)       Start:  07/12/24    Expected End:  08/11/24    Resolved:  08/16/24         Patient c/o 2/10 or less pain in L hand/wrist/FA with use during ADL's and home exercises.  (Progressing)       Start:  07/12/24    Expected End:  09/15/24            AROM: GUIDRY of L IF, LF, and AK=124 or more to assist with ADL completion.  " (Met)       Start:  07/12/24    Expected End:  08/11/24    Resolved:  08/16/24         L UE AROM is sufficient for independent completion of ADL's and home management.  (Progressing)       Start:  07/12/24    Expected End:  09/10/24            L UE strength is sufficient for independent completion of all ADL's and home management.  (Progressing)       Start:  07/12/24    Expected End:  09/10/24                Plan of care was developed with input and agreement by the patient    Treatments:     Modalities:        Modalities  Modalities Used: Yes  Modality 1: Untimed Hotpack with extension promotion     Therapeutic Exercise:    45 min  Therapeutic Exercise  Therapeutic Exercise Performed: Yes  Therapeutic Exercise Activity 1: L digtis 2,3, & 5 place and hold flexion and extension x 10 reps each  Therapeutic Exercise Activity 2: wrist AROM x 15 reps each  Therapeutic Exercise Activity 3: all digitflex x 20 reps each  Therapeutic Exercise Activity 4: wrist E/F with FA sup/pron x 15 reps each with use of 2# weight.    Assessment: Progress is noted in all areas of AROM, strength, pain control, and independence with daily function.        Plan:      Planned Interventions include: therapeutic exercise, therapeutic activity, self-care home management, manual therapy, therapeutic activities, gait training, neuromuscular coordination, vasopneumatic, dry needling, aquatic therapy, electric stimulation, fluidotherapy, ultrasound, kinesiotaping, orthosis fabrication, wound care  Frequency: 1-2 x Week  Duration: 4 Weeks    Viola Villasenor, OT

## 2024-08-19 ENCOUNTER — TREATMENT (OUTPATIENT)
Dept: OCCUPATIONAL THERAPY | Facility: HOSPITAL | Age: 74
End: 2024-08-19
Payer: COMMERCIAL

## 2024-08-19 ENCOUNTER — APPOINTMENT (OUTPATIENT)
Dept: OCCUPATIONAL THERAPY | Facility: HOSPITAL | Age: 74
End: 2024-08-19
Payer: COMMERCIAL

## 2024-08-19 DIAGNOSIS — G56.42 COMPLEX REGIONAL PAIN SYNDROME TYPE 2 OF LEFT UPPER EXTREMITY: ICD-10-CM

## 2024-08-19 PROCEDURE — 97140 MANUAL THERAPY 1/> REGIONS: CPT | Mod: GO | Performed by: OCCUPATIONAL THERAPIST

## 2024-08-19 PROCEDURE — 97110 THERAPEUTIC EXERCISES: CPT | Mod: GO | Performed by: OCCUPATIONAL THERAPIST

## 2024-08-19 ASSESSMENT — PAIN - FUNCTIONAL ASSESSMENT: PAIN_FUNCTIONAL_ASSESSMENT: 0-10

## 2024-08-19 ASSESSMENT — PAIN SCALES - GENERAL: PAINLEVEL_OUTOF10: 8

## 2024-08-19 NOTE — PROGRESS NOTES
"    Occupational Therapy  Occupational Therapy Treatment    Patient Name: Khurram Nicole  MRN: [id number]  Today's Date: 8/19/2024  Time Calculation  Start Time: 0945  Stop Time: 1040  Time Calculation (min): 55 min        Time:  Time Calculation  Start Time: 0945  Stop Time: 1040  Time Calculation (min): 55 min  OT Therapeutic Procedures Time Entry  Manual Therapy Time Entry: 8  Therapeutic Exercise Time Entry: 47    Insurance:  Visit number: 11 of 20  Authorization info: 20 approved visits until 9/30/24  Insurance Type: Payor: [employer]I / Plan: [employer] / Product Type: *No Product type* /    General:  Reason for visit: L UE CRPS  Referred by: Jamin     Current Problem  1. Complex regional pain syndrome type 2 of left upper extremity  Follow Up In Occupational Therapy          Precautions   none      Subjective:   Patient reports \"That finger is really sore today.\"(RE; L RF)    Performing HEP?: Yes    Pain  Pain Assessment: 0-10  0-10 (Numeric) Pain Score: 8  Pain Type: Chronic pain  Pain Location: Finger (Comment which one)  Pain Orientation: Left  Pain Radiating Towards: L RF  Post-tx pain=5/10    Objective:      strength #2 R/L=87/50#    Physical Observation: intact  Edema: mild throughout L RF      Treatments:     Modalities:        Modalities  Modalities Used: Yes  Modality 1: Untimed Fluidotherapy with AROM promotion    Therapeutic Exercise:   47 min  Therapeutic Exercise  Therapeutic Exercise Performed: Yes  Therapeutic Exercise Activity 1: place and hold flexion and extension of L IF, LF, and SF x10 reps each  Therapeutic Exercise Activity 2: wrist AROM x 15 reps with and without a 1# weigth  Therapeutic Exercise Activity 3: BTE program set-up and completion with 5 functional exercises/attachments    Manual Therapy:     8 min  Manual Therapy  Manual Therapy Performed: Yes  Manual Therapy Activity 1: soft tissue mobilization to dorsal and volar FA following tx with use of " Biofreeze      Assessment: Good tolerance with BTE program set-up.        Plan: Continue with plan of care 1-2x/wk       Viola Villasenor OT

## 2024-08-22 ENCOUNTER — APPOINTMENT (OUTPATIENT)
Dept: OCCUPATIONAL THERAPY | Facility: HOSPITAL | Age: 74
End: 2024-08-22
Payer: COMMERCIAL

## 2024-08-23 ENCOUNTER — TREATMENT (OUTPATIENT)
Dept: OCCUPATIONAL THERAPY | Facility: HOSPITAL | Age: 74
End: 2024-08-23
Payer: COMMERCIAL

## 2024-08-23 DIAGNOSIS — G56.42 COMPLEX REGIONAL PAIN SYNDROME TYPE 2 OF LEFT UPPER EXTREMITY: ICD-10-CM

## 2024-08-23 PROCEDURE — 97110 THERAPEUTIC EXERCISES: CPT | Mod: GO | Performed by: OCCUPATIONAL THERAPIST

## 2024-08-23 PROCEDURE — 97140 MANUAL THERAPY 1/> REGIONS: CPT | Mod: GO | Performed by: OCCUPATIONAL THERAPIST

## 2024-08-23 ASSESSMENT — PAIN - FUNCTIONAL ASSESSMENT: PAIN_FUNCTIONAL_ASSESSMENT: 0-10

## 2024-08-23 ASSESSMENT — PAIN SCALES - GENERAL: PAINLEVEL_OUTOF10: 7

## 2024-08-23 NOTE — PROGRESS NOTES
"    Occupational Therapy  Occupational Therapy Treatment    Patient Name: Khurram Nicole  MRN: [id number]  Today's Date: 8/23/2024  Time Calculation  Start Time: 1030  Stop Time: 1125  Time Calculation (min): 55 min    Time:  Time Calculation  Start Time: 1030  Stop Time: 1125  Time Calculation (min): 55 min  OT Therapeutic Procedures Time Entry  Manual Therapy Time Entry: 8  Therapeutic Exercise Time Entry: 47    Insurance:  Visit number: 12 of 20  Authorization info: 20 authorized visits   Insurance Type: Payor: [employer]I / Plan: [employer]ATION / Product Type: *No Product type* /    General:  Reason for visit: L UE CRPS  Referred by: Jamin    Current Problem  1. Complex regional pain syndrome type 2 of left upper extremity  Follow Up In Occupational Therapy          Precautions   none      Subjective:   Patient reports \"Worker's Comp denied my surgery to take that finger off.\" RE: L RF Ray Resection    Performing HEP?: Yes    Pain  Pain Assessment: 0-10  0-10 (Numeric) Pain Score: 7  Pain Type: Chronic pain  Pain Location: Finger (Comment which one)  Pain Orientation: Left  Pain Radiating Towards: L RF  Post-tx pain=7/10    Objective:      strength #2 R/L=84/84#    Physical Observation: PIP flexion contracture of RF   Edema: mild in circumferential L RF     Sensory: intact   Numbness/Tingling: none    Treatments:     Modalities:        Modalities  Modalities Used: Yes  Modality 1: Untimed Fluidotherapy with AROM promotion     Therapeutic Exercise:   47 min  Therapeutic Exercise  Therapeutic Exercise Performed: Yes  Therapeutic Exercise Activity 1: place and hold flexion and extension x 15 reps of IF, LF, and SF  Therapeutic Exercise Activity 2: wrist and FA AROM x 15 reps each  Therapeutic Exercise Activity 3: BTE program with 5 attachments    Manual Therapy:     8 min  Manual Therapy  Manual Therapy Performed: Yes  Manual Therapy Activity 1: soft tissue mobilization to dorsal and volar " FA      Assessment: Good tolerance with resistance progression.        Plan: Continue with plan of care 1-2x/wk       Viola Villasenor OT

## 2024-08-26 ENCOUNTER — TREATMENT (OUTPATIENT)
Dept: OCCUPATIONAL THERAPY | Facility: HOSPITAL | Age: 74
End: 2024-08-26
Payer: COMMERCIAL

## 2024-08-26 ENCOUNTER — APPOINTMENT (OUTPATIENT)
Dept: OCCUPATIONAL THERAPY | Facility: HOSPITAL | Age: 74
End: 2024-08-26
Payer: COMMERCIAL

## 2024-08-26 DIAGNOSIS — G56.42 COMPLEX REGIONAL PAIN SYNDROME TYPE 2 OF LEFT UPPER EXTREMITY: ICD-10-CM

## 2024-08-26 PROCEDURE — 97110 THERAPEUTIC EXERCISES: CPT | Mod: GO | Performed by: OCCUPATIONAL THERAPIST

## 2024-08-26 PROCEDURE — 97140 MANUAL THERAPY 1/> REGIONS: CPT | Mod: GO | Performed by: OCCUPATIONAL THERAPIST

## 2024-08-26 ASSESSMENT — PAIN SCALES - GENERAL: PAINLEVEL_OUTOF10: 8

## 2024-08-26 ASSESSMENT — PAIN - FUNCTIONAL ASSESSMENT: PAIN_FUNCTIONAL_ASSESSMENT: 0-10

## 2024-08-26 NOTE — PROGRESS NOTES
"    Occupational Therapy  Occupational Therapy Treatment    Patient Name: Khurram Nicole  MRN: [id number]  Today's Date: 8/26/2024  Time Calculation  Start Time: 1115  Stop Time: 1210  Time Calculation (min): 55 min      Time:  Time Calculation  Start Time: 1115  Stop Time: 1210  Time Calculation (min): 55 min  OT Therapeutic Procedures Time Entry  Manual Therapy Time Entry: 8  Therapeutic Exercise Time Entry: 47    Insurance:  Visit number: 13 of 20  Authorization info: 20 visits   Insurance Type: Payor: [employer]I / Plan: [employer]ATION / Product Type: *No Product type* /    General:  Reason for visit: L UE CRPS  Referred by: Jamin    Current Problem  1. Complex regional pain syndrome type 2 of left upper extremity  Follow Up In Occupational Therapy          Precautions   none      Subjective:   Patient reports \"I still have the same pain.\"    Performing HEP?: Yes    Pain  Pain Assessment: 0-10  0-10 (Numeric) Pain Score: 8  Pain Type: Chronic pain  Pain Location: Finger (Comment which one)  Pain Orientation: Left  Pain Radiating Towards: RF  Post-tx pain=7/10    Objective:      strength #2 R/L=88/65#    Physical Observation: PIP flexion contracture continues at L RF   Edema: minimal in circumferential RF     Sensory: intact   Numbness/Tingling: none    Treatments:     Modalities:        Modalities  Modalities Used: Yes  Modality 1: Untimed Fluidotherapy with AROM promotion to all digits and wrist/FA    Therapeutic Exercise:   47 min  Therapeutic Exercise  Therapeutic Exercise Performed: Yes  Therapeutic Exercise Activity 1: wrist and FA AROM x 15 reps each with and without a 2# weigth  Therapeutic Exercise Activity 2: all digflex x 20 reps each  Therapeutic Exercise Activity 3: BTE program with all 5 exercises.    Manual Therapy:     8 min  Manual Therapy  Manual Therapy Performed: Yes  Manual Therapy Activity 1: soft tissue mobilization to dorsal and volar FA      Assessment: Improvement " is noted with functional  strength this a.m.        Plan: Continue with plan of care 1-2x/wk       Viola Villasenor, OT

## 2024-08-29 ENCOUNTER — APPOINTMENT (OUTPATIENT)
Dept: OCCUPATIONAL THERAPY | Facility: HOSPITAL | Age: 74
End: 2024-08-29
Payer: COMMERCIAL

## 2024-08-29 ENCOUNTER — OFFICE VISIT (OUTPATIENT)
Dept: ORTHOPEDIC SURGERY | Facility: HOSPITAL | Age: 74
End: 2024-08-29
Payer: COMMERCIAL

## 2024-08-29 DIAGNOSIS — S62.615K: Primary | ICD-10-CM

## 2024-08-29 PROCEDURE — 99213 OFFICE O/P EST LOW 20 MIN: CPT | Performed by: ORTHOPAEDIC SURGERY

## 2024-08-29 ASSESSMENT — PAIN SCALES - GENERAL: PAINLEVEL_OUTOF10: 8

## 2024-08-29 ASSESSMENT — PAIN - FUNCTIONAL ASSESSMENT: PAIN_FUNCTIONAL_ASSESSMENT: 0-10

## 2024-08-29 NOTE — PROGRESS NOTES
Reason for Appointment  Chief Complaint   Patient presents with    Left Hand - Follow-up, Pain     History of Present Illness  Patient is a 73 y.o. 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  Pain continues.     History reviewed. No pertinent past medical history.    History reviewed. No pertinent surgical history.    Medication Documentation Review Audit       Reviewed by Kevin Quinones MD (Physician) on 08/29/24 at 1223      Medication Order Taking? Sig Documenting Provider Last Dose Status   acetaminophen (Tylenol) 325 mg tablet [id number] Yes Take 2 tablets (650 mg) by mouth every 6 hours if needed. Historical Provider, MD Taking Active   alpha lipoic acid 600 mg capsule [id number] Yes 1 p.o. after meal 3 times daily x 1 week increase to 2 p.o. 3 times daily Miquel Winkler MD Taking Active   amLODIPine (Norvasc) 2.5 mg tablet [id number] Yes Take 1 tablet (2.5 mg) by mouth once daily. Historical MD Allison Taking Active   ascorbic acid (Vitamin C) 1,000 mg tablet [id number] Yes Vitamin C Historical Provider, MD Taking Active   ascorbic acid (Vitamin C) 500 mg tablet [id number] Yes Take 1 tablet (500 mg) by mouth once daily. Miquel Winkler MD Taking Active   aspirin 81 mg chewable tablet [id number] Yes Chew  once daily. Historical Provider, MD Taking Active   atorvastatin (Lipitor) 20 mg tablet [id number] Yes Take 1 tablet (20 mg) by mouth once daily at bedtime. Historical Provider, MD Taking Active   b complex vitamins capsule [id number] Yes Take 1 capsule by mouth 2 times a day. Miquel Winkler MD Taking Active   CALCIUM 26-VIT D3-MAGNESIUM 15 ORAL [id number] Yes Calcium Jcarlos Cooper MD Taking Active   coenzyme Q-10 200 mg capsule [id number] Yes Take 1 capsule (200 mg) by mouth 3 times a day. Miquel Winkler MD Taking Active   ergocalciferol (Vitamin D-2) 1.25 MG (87394 UT) capsule [id number] Yes Take 1 capsule (1,250 mcg) by mouth 1 (one) time per week. Historical Provider, MD Taking Active   gabapentin (Neurontin) 100 mg capsule [id number] Yes Take 1 capsule (100 mg) by mouth 3 times a day. Titration schedule handed to patient Miquel Winkler MD Taking Active   ibuprofen (AdviL) 200 mg tablet [id number] Yes 1 tablet (200 mg). Historical Provider, MD Taking Active   predniSONE (Deltasone) 10 mg tablet [id number] Yes Please take after breakfast: 6 TABS X 2 DAYS, 5 TABS X 2 DAYS, 4 TABS X 2 DAYS, 3 TABS X  2 DAYS, 2 TABS X 7 DAYS, 1 TAB  X  2 WEEKS Miquel Winkler MD Taking Active   pregabalin (Lyrica) 25 mg capsule [id number]  Take 1 capsule (25 mg) by mouth 3 times a day. Discontinue gabapentin, Titration schedule handed to patient Miquel Winkler MD   24 [id number]   sertraline (Zoloft) 100 mg tablet [id number] Yes Take 1 tablet (100 mg) by mouth once daily. 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

## 2024-08-30 ENCOUNTER — DOCUMENTATION (OUTPATIENT)
Dept: OCCUPATIONAL THERAPY | Facility: HOSPITAL | Age: 74
End: 2024-08-30
Payer: COMMERCIAL

## 2024-08-30 ENCOUNTER — TREATMENT (OUTPATIENT)
Dept: OCCUPATIONAL THERAPY | Facility: HOSPITAL | Age: 74
End: 2024-08-30
Payer: COMMERCIAL

## 2024-08-30 DIAGNOSIS — G56.42 COMPLEX REGIONAL PAIN SYNDROME TYPE 2 OF LEFT UPPER EXTREMITY: ICD-10-CM

## 2024-09-06 ENCOUNTER — TREATMENT (OUTPATIENT)
Dept: OCCUPATIONAL THERAPY | Facility: HOSPITAL | Age: 74
End: 2024-09-06
Payer: COMMERCIAL

## 2024-09-06 DIAGNOSIS — G56.42 COMPLEX REGIONAL PAIN SYNDROME TYPE 2 OF LEFT UPPER EXTREMITY: ICD-10-CM

## 2024-09-06 PROCEDURE — 97110 THERAPEUTIC EXERCISES: CPT | Mod: GO | Performed by: OCCUPATIONAL THERAPIST

## 2024-09-06 PROCEDURE — 97140 MANUAL THERAPY 1/> REGIONS: CPT | Mod: GO | Performed by: OCCUPATIONAL THERAPIST

## 2024-09-06 ASSESSMENT — PAIN SCALES - GENERAL: PAINLEVEL_OUTOF10: 8

## 2024-09-06 ASSESSMENT — PAIN - FUNCTIONAL ASSESSMENT: PAIN_FUNCTIONAL_ASSESSMENT: 0-10

## 2024-09-06 NOTE — PROGRESS NOTES
"    Occupational Therapy  Occupational Therapy Treatment    Patient Name: Khurram Nicole  MRN: [id number]  Today's Date: 9/6/2024  Time Calculation  Start Time: 1115  Stop Time: 1210  Time Calculation (min): 55 min        Time:  Time Calculation  Start Time: 1115  Stop Time: 1210  Time Calculation (min): 55 min  OT Therapeutic Procedures Time Entry  Manual Therapy Time Entry: 8  Therapeutic Exercise Time Entry: 47    Insurance:  Visit number: 14 of 20   Authorization info: 20 authorized visits until 9/30/24  Insurance Type: Payor: [employer] / Plan: [employer]ATION / Product Type: *No Product type* /    General:  Reason for visit: L UE CRPS  Referred by: Jamin    Current Problem  1. Complex regional pain syndrome type 2 of left upper extremity  Follow Up In Occupational Therapy          Precautions   none      Subjective:   Patient reports \"I am doing pretty well except for that finger.\"    Performing HEP?: Yes    Pain  Pain Assessment: 0-10  0-10 (Numeric) Pain Score: 8  Pain Type: Chronic pain  Pain Location: Finger (Comment which one)  Pain Orientation: Left  Pain Radiating Towards: RF  Post-tx pain=6/10    Objective:     AROM: L  strength #2 R/L=91/64#    Physical Observation: PIP flexion contracture remains at PIP joint of L RF  Edema: moderated throughout L RF    Sensory: intact   Numbness/Tingling: none    Treatments:     Modalities:        Modalities  Modalities Used: Yes  Modality 1: Untimed Fluidotherapy with AROM promotion     Therapeutic Exercise:   47 min  Therapeutic Exercise  Therapeutic Exercise Performed: Yes  Therapeutic Exercise Activity 1: wrist and FA AROM x 15 reps each with and without use of a 2# weight.  Therapeutic Exercise Activity 2: BTE program with all 5 exercises with added resistance  Therapeutic Exercise Activity 3: all digiflex x 20 reps each    Manual Therapy:     8 min  Manual Therapy  Manual Therapy Performed: Yes  Manual Therapy Activity 1: soft tissue " mobilization with use of Biofreeze to dorsal and volar FA following tx.        Assessment: Patient continues to do well with all exercises involved in home program. L RF ray resection has not been approved at this time.        Plan: Continue with plan of care as needed following SX procedure(Ray resection of L RF).        Viola Villasenor, OT

## 2024-10-04 ENCOUNTER — DOCUMENTATION (OUTPATIENT)
Dept: OCCUPATIONAL THERAPY | Facility: HOSPITAL | Age: 74
End: 2024-10-04
Payer: COMMERCIAL

## 2024-10-04 NOTE — PROGRESS NOTES
Occupational Therapy Discharge      Patient Name: Khurram Nicole  MRN: [id number]  Today's Date: 10/4/2024      Time:       Insurance:  Visit number: 14 of 14 including evaluation  Authorization info: 20 authorized visits through 9/30/24  Insurance Type: Payor: [facility] / Plan: [employer] / Product Type: *No Product type* /     Referred by: Jamin  Referred for: L UE CRPS      Discharge Information: Date of discharge 10/4/24 and Date of last visit 9/6/24    Therapy Summary: AROM: L IF HNF=734, LF GUD=047, SF ZBX=779;  strength #2 R/L=91/64#    Discharge Status: Mr. Nicole has achieved his maximum level of rehabilitative potential. Khurram is waiting for approval of a ray resection of his L RF. Recommend for patient to continue all exercises until SX procedure occurs.      Rehab Discharge Reason: Achieved all and/or the most significant goals(s)

## 2024-10-22 ENCOUNTER — TELEPHONE (OUTPATIENT)
Dept: ORTHOPEDIC SURGERY | Facility: CLINIC | Age: 74
End: 2024-10-22
Payer: COMMERCIAL

## 2024-10-22 PROBLEM — S62.615B: Status: ACTIVE | Noted: 2024-08-08

## 2024-10-23 DIAGNOSIS — S62.615K: ICD-10-CM

## 2024-10-23 DIAGNOSIS — M24.542 CONTRACTURE OF JOINT OF LEFT HAND: ICD-10-CM

## 2024-10-23 DIAGNOSIS — S61.213A LACERATION OF LEFT MIDDLE FINGER WITHOUT FOREIGN BODY WITHOUT DAMAGE TO NAIL, INITIAL ENCOUNTER: ICD-10-CM

## 2024-10-23 DIAGNOSIS — S62.615B OPEN DISPLACED FRACTURE OF PROXIMAL PHALANX OF LEFT RING FINGER, INITIAL ENCOUNTER: ICD-10-CM

## 2024-10-24 ENCOUNTER — CLINICAL SUPPORT (OUTPATIENT)
Dept: PREADMISSION TESTING | Facility: HOSPITAL | Age: 74
End: 2024-10-24
Payer: COMMERCIAL

## 2024-10-24 VITALS — BODY MASS INDEX: 29.22 KG/M2 | HEIGHT: 75 IN | WEIGHT: 235 LBS

## 2024-10-24 RX ORDER — ROSUVASTATIN CALCIUM 5 MG/1
5 TABLET, COATED ORAL NIGHTLY
COMMUNITY

## 2024-10-24 RX ORDER — TRAMADOL HYDROCHLORIDE 50 MG/1
50 TABLET ORAL EVERY 6 HOURS PRN
COMMUNITY

## 2024-10-24 NOTE — PREPROCEDURE INSTRUCTIONS
Current Medications   Medication Instructions    amLODIPine (Norvasc) 2.5 mg tablet Take as directed    ascorbic acid (Vitamin C) 500 mg tablet Hold 7 days prior    aspirin 81 mg chewable tablet Per prescriber    ergocalciferol (Vitamin D-2) 1.25 MG (51544 UT) capsule Hold 7 days prior    ibuprofen (AdviL) 200 mg tablet Hold 7 days prior    RED BEET ROOT-SOUR CHERRY EXT ORAL Hold 7 days prior    rosuvastatin (Crestor) 5 mg tablet Take as directed    traMADol (Ultram) 50 mg tablet Take as needed                       NPO Instructions:    Do not eat any food after midnight the night before your surgery/procedure.    Additional Instructions:     Seven/Six Days before Surgery:  Review your medication instructions, stop indicated medications  Five Days before Surgery:  Review your medication instructions, stop indicated medications  Three Days before Surgery:  Review your medication instructions, stop indicated medications  The Day before Surgery:  Review your medication instructions, stop indicated medications  You will be contacted regarding the time of your arrival to facility and surgery time  Do not eat any food after Midnight  Day of Surgery:  Review your medication instructions, take indicated medications  Wear  comfortable loose fitting clothing  Do not use moisturizers, creams, lotions or perfume  All jewelry and valuables should be left at home  PAT PRE-OPERATIVE INSTRUCTIONS    [facility]  [address] [place] [address]  [phone]    Please let your surgeon know if:      You develop:  Open sores, shingles, burning or painful urination as these may increase your risk of an infection.   Fever=100.4 or greater   New or worsening cold or flu symptoms ( cough, shortness of breath, sore throat, respiratory distress, headache, fatigue, GI symptoms)   You no longer wish to have the surgery.   Any other personal circumstances change that may lead to the need to cancel or  defer this surgery-such as being sick or getting admitted to any hospital within one week of your planned procedure.    Your contact details change, such as a change of address or phone number.    Starting now:     Please DO NOT drink alcohol or smoke for 24 hours before surgery. It is well known that quitting smoking can make a huge difference to your health and recovery from surgery. The longer you abstain from smoking, the better your chances of a healthy recovery. If you need help with quitting, call 1-800-QUIT-NOW to be connected to a trained counselor who will discuss the best methods to help you quit.     Before your surgery:    Please stop all supplements/ vitamins 7 days prior to surgery (or as directed by your surgeon).   Please stop taking NSAID pain medicine such as Advil, Ibuprofen, and Motrin 7 days before surgery.    If you develop any fever, cough, cold, rashes, cuts, scratches, scrapes, urinary symptoms or infection anywhere on your body (including teeth and gums) prior to surgery, please call your surgeon’s office as soon as possible. This may require treatment to reduce the chance of cancellation on the day of surgery.    The day before your surgery:   DIET- Do not eat any food after MIDNIGHT.   Get a good night’s rest.  Use the special soap for bathing if you have been instructed to use one.    Scheduled surgery times may change and you will be notified if this occurs - please check your personal voicemail for any updates.     On the morning of surgery:   Wear comfortable, loose fitting clothes which open in the front.   Shower and please do not wear moisturizers, creams, lotions, deodorants, makeup or perfume.    Please bring with you to surgery:   Photo ID and insurance card   Current list of medicines and allergies   Pacemaker/ Defibrillator/Heart stent cards as well as remote controls for implanted devices    CPAP machine and mask    Slings/ splints/ crutches   A copy of your complete  advanced directive/DHPOA.    Please do NOT bring with you to surgery:   All jewelry and valuables should be left at home.   Prosthetic devices such as contact lenses, glasses, hearing aids, dentures, eyelash extensions, hairpins and body piercings must be removed prior to going in to the surgical suite. If you have a case for these items, please bring it with you on surgery day.    *Patients under the age of 18: A responsible adult must be present and remaining in the building throughout the surgical visit.    After outpatient surgery:   A responsible adult MUST accompany you at the time of discharge and stay with you for 24 hours after your surgery. You may NOT drive yourself home after surgery.    Do not drive, operate machinery, make critical decisions or do activities that require co-ordination or balance until after a night’s sleep.   Do not drink alcoholic beverages for 24 hours.   Instructions for resuming your medications will be provided by your surgeon.    CALL YOUR DOCTOR AFTER SURGERY IF YOU HAVE:     Chills and/or a fever of 101° F or higher.    Redness, swelling, pus or drainage from your surgical wound or a bad smell from the wound.    Lightheadedness, fainting or confusion.    Persistent vomiting (throwing up) and are not able to eat or drink for 12 hours.    Three or more loose, watery bowel movements in 24 hours (diarrhea).   Difficulty or pain while urinating( after non-urological surgery)    Pain and swelling in your legs, especially if it is only on one side.    Difficulty breathing or are breathing faster than normal.    Any new concerning symptoms.      Reviewed pre-op instructions with patient, states understanding and denies further questions at this time.      Take Care

## 2024-10-29 ENCOUNTER — APPOINTMENT (OUTPATIENT)
Dept: NEUROLOGY | Facility: CLINIC | Age: 74
End: 2024-10-29
Payer: COMMERCIAL

## 2024-10-29 VITALS
WEIGHT: 231 LBS | BODY MASS INDEX: 28.87 KG/M2 | HEART RATE: 61 BPM | RESPIRATION RATE: 18 BRPM | SYSTOLIC BLOOD PRESSURE: 147 MMHG | DIASTOLIC BLOOD PRESSURE: 85 MMHG

## 2024-10-29 DIAGNOSIS — G20.A1 PARKINSON'S DISEASE WITHOUT DYSKINESIA OR FLUCTUATING MANIFESTATIONS: Primary | ICD-10-CM

## 2024-10-29 PROCEDURE — 99214 OFFICE O/P EST MOD 30 MIN: CPT | Performed by: PSYCHIATRY & NEUROLOGY

## 2024-10-29 PROCEDURE — G2211 COMPLEX E/M VISIT ADD ON: HCPCS | Performed by: PSYCHIATRY & NEUROLOGY

## 2024-10-29 ASSESSMENT — UNIFIED PARKINSONS DISEASE RATING SCALE (UPDRS)
FREEZING_GAIT: 0
RIGIDITY_LUE: 1
POSTURAL_STABILITY: 0
PARKINSONS_MEDS: NO
GAIT: 1
FINGER_TAPPING_RIGHT: 2
AMPLITUDE_LUE: 0
CHAIR_RISING_SCALE: 0
TOETAPPING_RIGHT: 1
SPEECH: 1
HANDMOVEMENTS_RIGHT: 2
PRONATION_SUPINATION_LEFT: 1
CONSTANCY_TREMOR_ATREST: 2
AMPLITUDE_LLE: 0
POSTURE: 1
LEVODOPA: NO
AMPLITUDE_RUE: 2
TOTAL_SCORE: 28
LEG_AGILITY_RIGHT: 1
RIGIDITY_NECK: 2
AMPLITUDE_LIP_JAW: 0
TOETAPPING_LEFT: 0
KINETIC_TREMOR_RIGHTHAND: 0
POSTURAL_TREMOR_LEFTHAND: 0
PRONATION_SUPINATION_RIGHT: 1
RIGIDITY_RLE: 2
RIGIDITY_RUE: 2
POSTURAL_TREMOR_RIGHTHAND: 0
FACIAL_EXPRESSION: 2
SPONTANEITY_OF_MOVEMENT: 1
FINGER_TAPPING_LEFT: 1
LEG_AGILITY_LEFT: 0
AMPLITUDE_RLE: 0
KINETIC_TREMOR_LEFTHAND: 0
RIGIDITY_LLE: 1

## 2024-10-29 ASSESSMENT — ENCOUNTER SYMPTOMS
DEPRESSION: 0
LOSS OF SENSATION IN FEET: 0
OCCASIONAL FEELINGS OF UNSTEADINESS: 0

## 2024-10-29 ASSESSMENT — PATIENT HEALTH QUESTIONNAIRE - PHQ9
1. LITTLE INTEREST OR PLEASURE IN DOING THINGS: NOT AT ALL
2. FEELING DOWN, DEPRESSED OR HOPELESS: NOT AT ALL
SUM OF ALL RESPONSES TO PHQ9 QUESTIONS 1 AND 2: 0

## 2024-11-03 NOTE — H&P (VIEW-ONLY)
CPM/PAT Evaluation       Name: Khurram Nicole (Khurram iNcole)  /Age: 1950/73 y.o.     Visit Type:   In-Person       Chief Complaint: open displaced fracture of proximal phalanx of L ring finger    HPI This is a 74 yo male who is referred to PAT by Dr Quinones for evaluation in advance of amputation of L hand digit 24. 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    See PMH below    Past Medical History:   Diagnosis Date    Arthritis     Cerebral vascular accident (Multi)     Cervical disc disease     Colon polyp     GERD (gastroesophageal reflux disease)     Hyperlipidemia     Hypertension     Joint pain     Parkinson disease (Multi)     Spinal stenosis     Stroke (Multi)        Past Surgical History:   Procedure Laterality Date    APPENDECTOMY      COLONOSCOPY      HAND SURGERY Left        Patient Sexual activity questions deferred to the physician.    Family History   Problem Relation Name Age of Onset    Heart disease Mother      Coronary artery disease Father      Colon cancer Father      Kidney failure Father      Transient ischemic attack Sister         No Known Allergies    Medication Documentation Review Audit       Reviewed by Jenna Rae RN (Registered Nurse) on 24 at 1521      Medication Order Taking? Sig Documenting Provider Last Dose Status    Patient not taking:   Discontinued 24 1517    Patient not taking:   Discontinued 24 1517   amLODIPine (Norvasc) 2.5 mg tablet [id number] Yes Take 1 tablet (2.5 mg) by mouth 2 times a day. Historical Provider, MD 2024 Active   ascorbic acid (Vitamin C) 500 mg tablet [id number] Yes Take 1 tablet (500 mg) by mouth once daily.   Patient taking differently: Take 1 tablet (500 mg) by mouth once daily as needed.    Miquel Winkler MD Past Week Active   aspirin 81 mg chewable tablet [id number] Yes Chew once daily. Historical Provider, MD 2024 Active    Patient not taking:   Discontinued 24  "1519    Patient not taking:   Discontinued 24 1519   ergocalciferol (Vitamin D-2) 1.25 MG (82903 UT) capsule [id number] Yes Take 1 capsule (1,250 mcg) by mouth 1 (one) time per week. Historical Provider, MD 11/3/2024 Active   gabapentin (Neurontin) 100 mg capsule [id number] No Take 1 capsule (100 mg) by mouth 3 times a day. Titration schedule handed to patient   Patient not taking: Reported on 2024    Miquel Winkler MD Not Taking Active   ibuprofen (AdviL) 200 mg tablet [id number] Yes Take 3 tablets (600 mg) by mouth once daily as needed. Historical Provider, MD Past Week Active    Patient not taking:   Discontinued 24 1520   pregabalin (Lyrica) 25 mg capsule [id number] No Take 1 capsule (25 mg) by mouth 3 times a day. Discontinue gabapentin, Titration schedule handed to patient   Patient not taking: Reported on 2024    Miquel Winkler MD Not Taking  24 2359     Discontinued 24 1521   rosuvastatin (Crestor) 5 mg tablet [id number] Yes Take 1 tablet (5 mg) by mouth once daily at bedtime. Historical Provider, MD 11/3/2024 Active    Patient not taking:   Discontinued 24 1521   traMADol (Ultram) 50 mg tablet [id number] Yes Take 1 tablet (50 mg) by mouth every 6 hours if needed for severe pain (7 - 10). Historical Provider, MD Past Week Active                      PAT ROS:   Constitutional:   neg    Neuro/Psych:   neg    Eyes:   neg    Ears:   neg    Nose:   neg    Mouth:   neg    Throat:   Neck:   neg    Cardio:   neg    Respiratory:   neg    Endocrine:   neg    GI:   neg    :   neg    Musculoskeletal:    See HPI  Hematologic:   neg    Skin:  neg        Physical Exam  Vitals and nursing note reviewed. Physical exam within normal limits.          PAT AIRWAY:   Airway:     TM distance::  >3 FB    Neck ROM::  Full      Visit Vitals  /70   Pulse 72   Temp 36.3 °C (97.4 °F) (Temporal)   Resp 16   Ht 1.905 m (6' 3\")   Wt 105 kg (231 lb 4.2 oz)   SpO2 96%   BMI 28.91 kg/m² "   Smoking Status Never   BSA 2.36 m²           DASI Risk Score      Flowsheet Row Pre-Admission Testing from 11/4/2024 in [facility]   Can you take care of yourself (eat, dress, bathe, or use toilet)?  2.75 filed at 11/04/2024 1601   Can you walk indoors, such as around your house? 1.75 filed at 11/04/2024 1601   Can you walk a block or two on level ground?  2.75 filed at 11/04/2024 1601   Can you climb a flight of stairs or walk up a hill? 5.5 filed at 11/04/2024 1601   Can you run a short distance? 8 filed at 11/04/2024 1601   Can you do light work around the house like dusting or washing dishes? 2.7 filed at 11/04/2024 1601   Can you do moderate work around the house like vacuuming, sweeping floors or carrying groceries? 3.5 filed at 11/04/2024 1601   Can you do heavy work around the house like scrubbing floors or lifting and moving heavy furniture?  8 filed at 11/04/2024 1601   Can you do yard work like raking leaves, weeding or pushing a mower? 4.5 filed at 11/04/2024 1601   Can you have sexual relations? 0 filed at 11/04/2024 1601   Can you participate in moderate recreational activities like golf, bowling, dancing, doubles tennis or throwing a baseball or football? 0 filed at 11/04/2024 1601   Can you participate in strenous sports like swimming, singles tennis, football, basketball, or skiing? 0 filed at 11/04/2024 1601   DASI SCORE 39.45 filed at 11/04/2024 1601   METS Score (Will be calculated only when all the questions are answered) 7.6 filed at 11/04/2024 1601          Caprini DVT Assessment      Flowsheet Row Pre-Admission Testing from 11/4/2024 in [facility]   DVT Score 4 filed at 11/04/2024 1600   Surgical Factors Minor surgery planned filed at 11/04/2024 1600   BMI 30 or less filed at 11/04/2024 1600          Modified Frailty Index      Flowsheet Row Pre-Admission Testing from 11/4/2024 in [facility]   Non-independent functional status (problems  with dressing, bathing, personal grooming, or cooking) 0 filed at 11/04/2024 1602   History of diabetes mellitus  0 filed at 11/04/2024 1602   History of COPD 0 filed at 11/04/2024 1602   History of CHF No filed at 11/04/2024 1602   History of MI 0 filed at 11/04/2024 1602   History of Percutaneous Coronary Intervention, Cardiac Surgery, or Angina No filed at 11/04/2024 1602   Hypertension requiring the use of medication  0.0909 filed at 11/04/2024 1602   Peripheral vascular disease 0 filed at 11/04/2024 1602   Impaired sensorium (cognitive impairement or loss, clouding, or delirium) 0 filed at 11/04/2024 1602   TIA or CVA withouy residual deficit 0.0909 filed at 11/04/2024 1602   Cerebrovascular accident with deficit 0 filed at 11/04/2024 1602   Modified Frailty Index Calculator .1818 filed at 11/04/2024 1602          CHADS2 Stroke Risk  Current as of today        N/A 3 to 100%: High Risk   2 to < 3%: Medium Risk   0 to < 2%: Low Risk     Last Change: N/A          This score determines the patient's risk of having a stroke if the patient has atrial fibrillation.        This score is not applicable to this patient. Components are not calculated.          Revised Cardiac Risk Index      Flowsheet Row Pre-Admission Testing from 11/4/2024 in [facility]   High-Risk Surgery (Intraperitoneal, Intrathoracic,Suprainguinal vascular) 0 filed at 11/04/2024 1601   History of ischemic heart disease (History of MI, History of positive exercuse test, Current chest paint considered due to myocardial ischemia, Use of nitrate therapy, ECG with pathological Q Waves) 0 filed at 11/04/2024 1601   History of congestive heart failure (pulmonary edemia, bilateral rales or S3 gallop, Paroxysmal nocturnal dyspnea, CXR showing pulmonary vascular redistribution) 0 filed at 11/04/2024 1601   History of cerebrovascular disease (Prior TIA or stroke) 1 filed at 11/04/2024 1601   Pre-operative insulin treatment 0 filed at  11/04/2024 1601   Pre-operative creatinine>2 mg/dl 0 filed at 11/04/2024 1601   Revised Cardiac Risk Calculator 1 filed at 11/04/2024 1601          Apfel Simplified Score      Flowsheet Row Pre-Admission Testing from 11/4/2024 in [facility]   Smoking status 1 filed at 11/04/2024 1601   History of motion sickness or PONV  0 filed at 11/04/2024 1601   Use of postoperative opioids 0 filed at 11/04/2024 1601   Gender - Female 0=No filed at 11/04/2024 1601   Apfel Simplified Score Calculator 1 filed at 11/04/2024 1601          Risk Analysis Index Results This Encounter    No data found in the last 10 encounters.       Stop Bang Score      Flowsheet Row Pre-Admission Testing from 11/4/2024 in [facility] Clinical Support from 10/24/2024 in [facility]   Do you snore loudly? 1 filed at 11/04/2024 1521 0 filed at 10/24/2024 1408   Do you often feel tired or fatigued after your sleep? 1 filed at 11/04/2024 1521 1 filed at 10/24/2024 1408   Has anyone ever observed you stop breathing in your sleep? 0 filed at 11/04/2024 1521 0 filed at 10/24/2024 1408   Do you have or are you being treated for high blood pressure? 1 filed at 11/04/2024 1521 1 filed at 10/24/2024 1408   Recent BMI (Calculated) 28.9 filed at 11/04/2024 1521 29.4 filed at 10/24/2024 1408   Is BMI greater than 35 kg/m2? 0=No filed at 11/04/2024 1521 0=No filed at 10/24/2024 1408   Age older than 50 years old? 1=Yes filed at 11/04/2024 1521 1=Yes filed at 10/24/2024 1408   Is your neck circumference greater than 17 inches (Male) or 16 inches (Female)? 0 filed at 11/04/2024 1521 --   Gender - Male 1=Yes filed at 11/04/2024 1521 1=Yes filed at 10/24/2024 1408   STOP-BANG Total Score 5 filed at 11/04/2024 1521 --          Prodigy: High Risk  Total Score: 23              Prodigy Age Score      Prodigy Gender Score     Prodigy Previous Opioid Use Score           ARISCAT Score for Postoperative Pulmonary Complications       Flowsheet Row Pre-Admission Testing from 11/4/2024 in [facility]   Age, years  3 filed at 11/04/2024 1602   Preoperative SpO2 0 filed at 11/04/2024 1602   Respiratory infection in the last month Either upper or lower (i.e., URI, bronchitis, pneumonia), with fever and antibiotic treatment 0 filed at 11/04/2024 1602   Preoperative anemoa (Hgb less than 10 g/dl) 0 filed at 11/04/2024 1602   Surgical incision  0 filed at 11/04/2024 1602   Duration of surgery  0 filed at 11/04/2024 1602   Emergency Procedure  0 filed at 11/04/2024 1602   ARISCAT Total Score  3 filed at 11/04/2024 1602          Cary Perioperative Risk for Myocardial Infarction or Cardiac Arrest (CHELA)      Flowsheet Row Pre-Admission Testing from 11/4/2024 in [facility]   Age 1.42 filed at 11/04/2024 1602   Functional Status  0 filed at 11/04/2024 1602   ASA Class  -1.92 filed at 11/04/2024 1602   Creatinine 0 filed at 11/04/2024 1602   Type of Procedure  0.80 filed at 11/04/2024 1602   CHELA Total Score  -4.95 filed at 11/04/2024 1602        CHELA 0.7%    Assessment and Plan   74 yo male presents to [facility] for risk assessment and preoperative optimization in advance of finger amputation    Today his VSS and he is afebrile.  He does endorse L finger pain aching, shooting, sharp and constant 7/10.     Neuro:  Depression-continue Zoloft 100 mg daily as prescribed    Peripheral neuropathy and Tremors- Parkinson's- not currently on medication     Seen by Neurologist 10/29/24 who noted: Dr. Nguyen  ASSESSMENT:     Khurram Nicole is a 73 year-old R handed male with HTN, CAD, depression, and peripheral neuropathy presenting for follow-up of tremors, concern for Parkinsonism. Last seen about 1 year ago on 10/4/2023. His examination today off of C/L is most consistent with idopathic, tremor-predominant PD. This is consistent with prior clinical impressions. We discussed treatment options, with a primary recommendation to retry  sinemet while also addressing the patient's poor sleep quality (although refuses a sleep study). We also discussed options, albeit less effective, including amantadine or rasagiline. The patient elected to discuss this over with his spouse and reassess if he wants to be on medication.     In regards to his prior infarct in 2023. This occurred at [facility]. He has a minimal DWI diffusion restriction indicating a very small L IRENE infarct. He has no residual stroke symptoms. He has been appropriately worked up. There are no overt contraindications from a stroke or Parkinson's standpoint for elective surgery. However, overall cardiac and medical clearance should be confirmed by the patient's PCP.     PLAN / RECOMMENDATIONS:  # Idiopathic PD  - Favor starting C/L, but per patient preference will also weigh other options including amantadine or rasagiline  - RTC in 4 months     the patient is at an increased risk for post operative delirium secondary to age >/= 65.  Preoperative brain exercise  discussed with patient.     The patient is at an increased risk for perioperative stroke secondary       HEENT/Airway:  No diagnosis or significant findings on chart review or clinical presentation and evaluation.     Cardiovascular:  HTN BP stable today  Continue amlodipine 2.5 mg daily as prescribed    CAD - elevated CAC score LAD 10/10/2020 220.63     HPL- 1/13/2024 lipid panel from O[facility] reviewed  Continue Crestor 5 mg as prescrbed      EKG - 11/4/24 preliminary SR with 1st degree AVB with occasional PVC, cannot rule out ant. infarct c/w 12/11/202 NSR 1st degree block, septal infarct     Nuclear Stress test 12/11/2020  IMPRESSION:  1. Normal stress myocardial perfusion imaging.  2. Well-maintained left ventricular function.  67%    Pulmonary:  No diagnosis or significant findings on chart review or clinical presentation and evaluation.     PRODIGY: High risk for opioid induced respiratory depression  Discussed smoking cessation and  "deep breathing handout given    Renal:   No diagnosis or significant findings on chart review, or clinical presentation and evaluation.     Pt at Moderate risk for perioperative RICHARD based on Dynamic Predictive Scoring Tool for Perioperative RICHARD  No results found for: \"GLUCOSE\", \"CALCIUM\", \"NA\", \"K\", \"CO2\", \"CL\", \"BUN\", \"CREATININE\"    Endocrine:  No diagnosis or significant findings on chart review or clinical presentation and evaluation.     Lab Results   Component Value Date    HGBA1C 5.2 01/13/2024       Hematologic:  No diagnosis or significant findings on chart review or clinical presentation and evaluation.  No results found for: \"WBC\", \"HGB\", \"HCT\", \"MCV\", \"PLT\"    Pt supplied education/VTE handout    Gastrointestinal:   No diagnosis or significant findings on chart review or clinical presentation and evaluation.   EAT-10 score of 0 - self-perceived oropharyngeal dysphagia scale (0-40)      :  No diagnosis or significant findings on chart review or clinical presentation and evaluation.     Infectious disease:   No diagnosis or significant findings on chart review or clinical presentation and evaluation.     Musculoskeletal:   See HPI    Preoperative risk assessment  ASA III  NSQIP - Predicted length of stay days 0  PAT Testing - EKG, cbc, bmp    Anesthesia:  No anesthesia complications    denies dental issues  Smoker- denies  Drugs-denies  ETOH-denies  Denies personal/family issues with Anesthesia    Face to Face patient contact time 45 min    KAMILLE Barron-CNP 11/4/2024 4:09 PM        "

## 2024-11-03 NOTE — CPM/PAT H&P
CPM/PAT Evaluation       Name: Khurram Nicole (Khurram Nicole)  /Age: 1950/73 y.o.     Visit Type:   In-Person       Chief Complaint: open displaced fracture of proximal phalanx of L ring finger    HPI This is a 74 yo male who is referred to PAT by Dr Quinones for evaluation in advance of amputation of L hand digit 24. 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    See PMH below    Past Medical History:   Diagnosis Date    Arthritis     Cerebral vascular accident (Multi)     Cervical disc disease     Colon polyp     GERD (gastroesophageal reflux disease)     Hyperlipidemia     Hypertension     Joint pain     Parkinson disease (Multi)     Spinal stenosis     Stroke (Multi)        Past Surgical History:   Procedure Laterality Date    APPENDECTOMY      COLONOSCOPY      HAND SURGERY Left        Patient Sexual activity questions deferred to the physician.    Family History   Problem Relation Name Age of Onset    Heart disease Mother      Coronary artery disease Father      Colon cancer Father      Kidney failure Father      Transient ischemic attack Sister         No Known Allergies    Medication Documentation Review Audit       Reviewed by Jenna Rae RN (Registered Nurse) on 24 at 1521      Medication Order Taking? Sig Documenting Provider Last Dose Status    Patient not taking:   Discontinued 24 1517    Patient not taking:   Discontinued 24 1517   amLODIPine (Norvasc) 2.5 mg tablet [id number] Yes Take 1 tablet (2.5 mg) by mouth 2 times a day. Historical Provider, MD 2024 Active   ascorbic acid (Vitamin C) 500 mg tablet [id number] Yes Take 1 tablet (500 mg) by mouth once daily.   Patient taking differently: Take 1 tablet (500 mg) by mouth once daily as needed.    Miquel Winkler MD Past Week Active   aspirin 81 mg chewable tablet [id number] Yes Chew once daily. Historical Provider, MD 2024 Active    Patient not taking:   Discontinued 24  "1519    Patient not taking:   Discontinued 24 1519   ergocalciferol (Vitamin D-2) 1.25 MG (98312 UT) capsule [id number] Yes Take 1 capsule (1,250 mcg) by mouth 1 (one) time per week. Historical Provider, MD 11/3/2024 Active   gabapentin (Neurontin) 100 mg capsule [id number] No Take 1 capsule (100 mg) by mouth 3 times a day. Titration schedule handed to patient   Patient not taking: Reported on 2024    Miquel Winkler MD Not Taking Active   ibuprofen (AdviL) 200 mg tablet [id number] Yes Take 3 tablets (600 mg) by mouth once daily as needed. Historical Provider, MD Past Week Active    Patient not taking:   Discontinued 24 1520   pregabalin (Lyrica) 25 mg capsule [id number] No Take 1 capsule (25 mg) by mouth 3 times a day. Discontinue gabapentin, Titration schedule handed to patient   Patient not taking: Reported on 2024    Miquel Winkler MD Not Taking  24 2359     Discontinued 24 1521   rosuvastatin (Crestor) 5 mg tablet [id number] Yes Take 1 tablet (5 mg) by mouth once daily at bedtime. Historical Provider, MD 11/3/2024 Active    Patient not taking:   Discontinued 24 1521   traMADol (Ultram) 50 mg tablet [id number] Yes Take 1 tablet (50 mg) by mouth every 6 hours if needed for severe pain (7 - 10). Historical Provider, MD Past Week Active                      PAT ROS:   Constitutional:   neg    Neuro/Psych:   neg    Eyes:   neg    Ears:   neg    Nose:   neg    Mouth:   neg    Throat:   Neck:   neg    Cardio:   neg    Respiratory:   neg    Endocrine:   neg    GI:   neg    :   neg    Musculoskeletal:    See HPI  Hematologic:   neg    Skin:  neg        Physical Exam  Vitals and nursing note reviewed. Physical exam within normal limits.          PAT AIRWAY:   Airway:     TM distance::  >3 FB    Neck ROM::  Full      Visit Vitals  /70   Pulse 72   Temp 36.3 °C (97.4 °F) (Temporal)   Resp 16   Ht 1.905 m (6' 3\")   Wt 105 kg (231 lb 4.2 oz)   SpO2 96%   BMI 28.91 kg/m² "   Smoking Status Never   BSA 2.36 m²           DASI Risk Score      Flowsheet Row Pre-Admission Testing from 11/4/2024 in [facility]   Can you take care of yourself (eat, dress, bathe, or use toilet)?  2.75 filed at 11/04/2024 1601   Can you walk indoors, such as around your house? 1.75 filed at 11/04/2024 1601   Can you walk a block or two on level ground?  2.75 filed at 11/04/2024 1601   Can you climb a flight of stairs or walk up a hill? 5.5 filed at 11/04/2024 1601   Can you run a short distance? 8 filed at 11/04/2024 1601   Can you do light work around the house like dusting or washing dishes? 2.7 filed at 11/04/2024 1601   Can you do moderate work around the house like vacuuming, sweeping floors or carrying groceries? 3.5 filed at 11/04/2024 1601   Can you do heavy work around the house like scrubbing floors or lifting and moving heavy furniture?  8 filed at 11/04/2024 1601   Can you do yard work like raking leaves, weeding or pushing a mower? 4.5 filed at 11/04/2024 1601   Can you have sexual relations? 0 filed at 11/04/2024 1601   Can you participate in moderate recreational activities like golf, bowling, dancing, doubles tennis or throwing a baseball or football? 0 filed at 11/04/2024 1601   Can you participate in strenous sports like swimming, singles tennis, football, basketball, or skiing? 0 filed at 11/04/2024 1601   DASI SCORE 39.45 filed at 11/04/2024 1601   METS Score (Will be calculated only when all the questions are answered) 7.6 filed at 11/04/2024 1601          Caprini DVT Assessment      Flowsheet Row Pre-Admission Testing from 11/4/2024 in [facility]   DVT Score 4 filed at 11/04/2024 1600   Surgical Factors Minor surgery planned filed at 11/04/2024 1600   BMI 30 or less filed at 11/04/2024 1600          Modified Frailty Index      Flowsheet Row Pre-Admission Testing from 11/4/2024 in [facility]   Non-independent functional status (problems  with dressing, bathing, personal grooming, or cooking) 0 filed at 11/04/2024 1602   History of diabetes mellitus  0 filed at 11/04/2024 1602   History of COPD 0 filed at 11/04/2024 1602   History of CHF No filed at 11/04/2024 1602   History of MI 0 filed at 11/04/2024 1602   History of Percutaneous Coronary Intervention, Cardiac Surgery, or Angina No filed at 11/04/2024 1602   Hypertension requiring the use of medication  0.0909 filed at 11/04/2024 1602   Peripheral vascular disease 0 filed at 11/04/2024 1602   Impaired sensorium (cognitive impairement or loss, clouding, or delirium) 0 filed at 11/04/2024 1602   TIA or CVA withouy residual deficit 0.0909 filed at 11/04/2024 1602   Cerebrovascular accident with deficit 0 filed at 11/04/2024 1602   Modified Frailty Index Calculator .1818 filed at 11/04/2024 1602          CHADS2 Stroke Risk  Current as of today        N/A 3 to 100%: High Risk   2 to < 3%: Medium Risk   0 to < 2%: Low Risk     Last Change: N/A          This score determines the patient's risk of having a stroke if the patient has atrial fibrillation.        This score is not applicable to this patient. Components are not calculated.          Revised Cardiac Risk Index      Flowsheet Row Pre-Admission Testing from 11/4/2024 in [facility]   High-Risk Surgery (Intraperitoneal, Intrathoracic,Suprainguinal vascular) 0 filed at 11/04/2024 1601   History of ischemic heart disease (History of MI, History of positive exercuse test, Current chest paint considered due to myocardial ischemia, Use of nitrate therapy, ECG with pathological Q Waves) 0 filed at 11/04/2024 1601   History of congestive heart failure (pulmonary edemia, bilateral rales or S3 gallop, Paroxysmal nocturnal dyspnea, CXR showing pulmonary vascular redistribution) 0 filed at 11/04/2024 1601   History of cerebrovascular disease (Prior TIA or stroke) 1 filed at 11/04/2024 1601   Pre-operative insulin treatment 0 filed at  11/04/2024 1601   Pre-operative creatinine>2 mg/dl 0 filed at 11/04/2024 1601   Revised Cardiac Risk Calculator 1 filed at 11/04/2024 1601          Apfel Simplified Score      Flowsheet Row Pre-Admission Testing from 11/4/2024 in [facility]   Smoking status 1 filed at 11/04/2024 1601   History of motion sickness or PONV  0 filed at 11/04/2024 1601   Use of postoperative opioids 0 filed at 11/04/2024 1601   Gender - Female 0=No filed at 11/04/2024 1601   Apfel Simplified Score Calculator 1 filed at 11/04/2024 1601          Risk Analysis Index Results This Encounter    No data found in the last 10 encounters.       Stop Bang Score      Flowsheet Row Pre-Admission Testing from 11/4/2024 in [facility] Clinical Support from 10/24/2024 in [facility]   Do you snore loudly? 1 filed at 11/04/2024 1521 0 filed at 10/24/2024 1408   Do you often feel tired or fatigued after your sleep? 1 filed at 11/04/2024 1521 1 filed at 10/24/2024 1408   Has anyone ever observed you stop breathing in your sleep? 0 filed at 11/04/2024 1521 0 filed at 10/24/2024 1408   Do you have or are you being treated for high blood pressure? 1 filed at 11/04/2024 1521 1 filed at 10/24/2024 1408   Recent BMI (Calculated) 28.9 filed at 11/04/2024 1521 29.4 filed at 10/24/2024 1408   Is BMI greater than 35 kg/m2? 0=No filed at 11/04/2024 1521 0=No filed at 10/24/2024 1408   Age older than 50 years old? 1=Yes filed at 11/04/2024 1521 1=Yes filed at 10/24/2024 1408   Is your neck circumference greater than 17 inches (Male) or 16 inches (Female)? 0 filed at 11/04/2024 1521 --   Gender - Male 1=Yes filed at 11/04/2024 1521 1=Yes filed at 10/24/2024 1408   STOP-BANG Total Score 5 filed at 11/04/2024 1521 --          Prodigy: High Risk  Total Score: 23              Prodigy Age Score      Prodigy Gender Score     Prodigy Previous Opioid Use Score           ARISCAT Score for Postoperative Pulmonary Complications       Flowsheet Row Pre-Admission Testing from 11/4/2024 in [facility]   Age, years  3 filed at 11/04/2024 1602   Preoperative SpO2 0 filed at 11/04/2024 1602   Respiratory infection in the last month Either upper or lower (i.e., URI, bronchitis, pneumonia), with fever and antibiotic treatment 0 filed at 11/04/2024 1602   Preoperative anemoa (Hgb less than 10 g/dl) 0 filed at 11/04/2024 1602   Surgical incision  0 filed at 11/04/2024 1602   Duration of surgery  0 filed at 11/04/2024 1602   Emergency Procedure  0 filed at 11/04/2024 1602   ARISCAT Total Score  3 filed at 11/04/2024 1602          [place]ary Perioperative Risk for Myocardial Infarction or Cardiac Arrest (CHELA)      Flowsheet Row Pre-Admission Testing from 11/4/2024 in [facility]   Age 1.42 filed at 11/04/2024 1602   Functional Status  0 filed at 11/04/2024 1602   ASA Class  -1.92 filed at 11/04/2024 1602   Creatinine 0 filed at 11/04/2024 1602   Type of Procedure  0.80 filed at 11/04/2024 1602   CHELA Total Score  -4.95 filed at 11/04/2024 1602        CHELA 0.7%    Assessment and Plan   74 yo male presents to [facility] for risk assessment and preoperative optimization in advance of finger amputation    Today his VSS and he is afebrile.  He does endorse L finger pain aching, shooting, sharp and constant 7/10.     Neuro:  Depression-continue Zoloft 100 mg daily as prescribed    Peripheral neuropathy and Tremors- Parkinson's- not currently on medication     Seen by Neurologist 10/29/24 who noted: Dr. Nguyen  ASSESSMENT:     Khurram Nicole is a 73 year-old R handed male with HTN, CAD, depression, and peripheral neuropathy presenting for follow-up of tremors, concern for Parkinsonism. Last seen about 1 year ago on 10/4/2023. His examination today off of C/L is most consistent with idopathic, tremor-predominant PD. This is consistent with prior clinical impressions. We discussed treatment options, with a primary recommendation to retry  sinemet while also addressing the patient's poor sleep quality (although refuses a sleep study). We also discussed options, albeit less effective, including amantadine or rasagiline. The patient elected to discuss this over with his spouse and reassess if he wants to be on medication.     In regards to his prior infarct in 2023. This occurred at [facility]. He has a minimal DWI diffusion restriction indicating a very small L IRENE infarct. He has no residual stroke symptoms. He has been appropriately worked up. There are no overt contraindications from a stroke or Parkinson's standpoint for elective surgery. However, overall cardiac and medical clearance should be confirmed by the patient's PCP.     PLAN / RECOMMENDATIONS:  # Idiopathic PD  - Favor starting C/L, but per patient preference will also weigh other options including amantadine or rasagiline  - RTC in 4 months     the patient is at an increased risk for post operative delirium secondary to age >/= 65.  Preoperative brain exercise  discussed with patient.     The patient is at an increased risk for perioperative stroke secondary       HEENT/Airway:  No diagnosis or significant findings on chart review or clinical presentation and evaluation.     Cardiovascular:  HTN BP stable today  Continue amlodipine 2.5 mg daily as prescribed    CAD - elevated CAC score LAD 10/10/2020 220.63     HPL- 1/13/2024 lipid panel from OSH reviewed  Continue Crestor 5 mg as prescrbed      EKG - 11/4/24 preliminary SR with 1st degree AVB with occasional PVC, cannot rule out ant. infarct c/w 12/11/202 NSR 1st degree block, septal infarct     Nuclear Stress test 12/11/2020  IMPRESSION:  1. Normal stress myocardial perfusion imaging.  2. Well-maintained left ventricular function.  67%    Pulmonary:  No diagnosis or significant findings on chart review or clinical presentation and evaluation.     PRODIGY: High risk for opioid induced respiratory depression  Discussed smoking cessation and  "deep breathing handout given    Renal:   No diagnosis or significant findings on chart review, or clinical presentation and evaluation.     Pt at Moderate risk for perioperative RICHARD based on Dynamic Predictive Scoring Tool for Perioperative RICHARD  No results found for: \"GLUCOSE\", \"CALCIUM\", \"NA\", \"K\", \"CO2\", \"CL\", \"BUN\", \"CREATININE\"    Endocrine:  No diagnosis or significant findings on chart review or clinical presentation and evaluation.     Lab Results   Component Value Date    HGBA1C 5.2 01/13/2024       Hematologic:  No diagnosis or significant findings on chart review or clinical presentation and evaluation.  No results found for: \"WBC\", \"HGB\", \"HCT\", \"MCV\", \"PLT\"    Pt supplied education/VTE handout    Gastrointestinal:   No diagnosis or significant findings on chart review or clinical presentation and evaluation.   EAT-10 score of 0 - self-perceived oropharyngeal dysphagia scale (0-40)      :  No diagnosis or significant findings on chart review or clinical presentation and evaluation.     Infectious disease:   No diagnosis or significant findings on chart review or clinical presentation and evaluation.     Musculoskeletal:   See HPI    Preoperative risk assessment  ASA III  NSQIP - Predicted length of stay days 0  PAT Testing - EKG, cbc, bmp    Anesthesia:  No anesthesia complications    denies dental issues  Smoker- denies  Drugs-denies  ETOH-denies  Denies personal/family issues with Anesthesia    Face to Face patient contact time 45 min    KAMILLE Barron-CNP 11/4/2024 4:09 PM        "

## 2024-11-04 ENCOUNTER — APPOINTMENT (OUTPATIENT)
Dept: PREADMISSION TESTING | Facility: HOSPITAL | Age: 74
End: 2024-11-04
Payer: COMMERCIAL

## 2024-11-04 ENCOUNTER — PRE-ADMISSION TESTING (OUTPATIENT)
Dept: PREADMISSION TESTING | Facility: HOSPITAL | Age: 74
End: 2024-11-04
Payer: COMMERCIAL

## 2024-11-04 VITALS
SYSTOLIC BLOOD PRESSURE: 135 MMHG | HEART RATE: 72 BPM | OXYGEN SATURATION: 96 % | HEIGHT: 75 IN | WEIGHT: 231.26 LBS | DIASTOLIC BLOOD PRESSURE: 70 MMHG | TEMPERATURE: 97.4 F | BODY MASS INDEX: 28.75 KG/M2 | RESPIRATION RATE: 16 BRPM

## 2024-11-04 DIAGNOSIS — S62.615B OPEN DISPLACED FRACTURE OF PROXIMAL PHALANX OF LEFT RING FINGER, INITIAL ENCOUNTER: ICD-10-CM

## 2024-11-04 DIAGNOSIS — S61.213A LACERATION OF LEFT MIDDLE FINGER WITHOUT FOREIGN BODY WITHOUT DAMAGE TO NAIL, INITIAL ENCOUNTER: ICD-10-CM

## 2024-11-04 DIAGNOSIS — Z01.818 PREOP TESTING: Primary | ICD-10-CM

## 2024-11-04 DIAGNOSIS — M24.542 CONTRACTURE OF JOINT OF LEFT HAND: ICD-10-CM

## 2024-11-04 DIAGNOSIS — S62.615K: ICD-10-CM

## 2024-11-04 LAB
ANION GAP SERPL CALC-SCNC: 12 MMOL/L (ref 10–20)
BASOPHILS # BLD AUTO: 0.03 X10*3/UL (ref 0–0.1)
BASOPHILS NFR BLD AUTO: 0.4 %
BUN SERPL-MCNC: 21 MG/DL (ref 6–23)
CALCIUM SERPL-MCNC: 9.3 MG/DL (ref 8.6–10.3)
CHLORIDE SERPL-SCNC: 106 MMOL/L (ref 98–107)
CO2 SERPL-SCNC: 25 MMOL/L (ref 21–32)
CREAT SERPL-MCNC: 0.86 MG/DL (ref 0.5–1.3)
EGFRCR SERPLBLD CKD-EPI 2021: >90 ML/MIN/1.73M*2
EOSINOPHIL # BLD AUTO: 0.1 X10*3/UL (ref 0–0.4)
EOSINOPHIL NFR BLD AUTO: 1.2 %
ERYTHROCYTE [DISTWIDTH] IN BLOOD BY AUTOMATED COUNT: 12 % (ref 11.5–14.5)
GLUCOSE SERPL-MCNC: 105 MG/DL (ref 74–99)
HCT VFR BLD AUTO: 45.2 % (ref 41–52)
HGB BLD-MCNC: 15.4 G/DL (ref 13.5–17.5)
IMM GRANULOCYTES # BLD AUTO: 0.02 X10*3/UL (ref 0–0.5)
IMM GRANULOCYTES NFR BLD AUTO: 0.2 % (ref 0–0.9)
LYMPHOCYTES # BLD AUTO: 2.03 X10*3/UL (ref 0.8–3)
LYMPHOCYTES NFR BLD AUTO: 24 %
MCH RBC QN AUTO: 29.4 PG (ref 26–34)
MCHC RBC AUTO-ENTMCNC: 34.1 G/DL (ref 32–36)
MCV RBC AUTO: 86 FL (ref 80–100)
MONOCYTES # BLD AUTO: 0.64 X10*3/UL (ref 0.05–0.8)
MONOCYTES NFR BLD AUTO: 7.6 %
NEUTROPHILS # BLD AUTO: 5.65 X10*3/UL (ref 1.6–5.5)
NEUTROPHILS NFR BLD AUTO: 66.6 %
NRBC BLD-RTO: ABNORMAL /100{WBCS}
PLATELET # BLD AUTO: 197 X10*3/UL (ref 150–450)
POTASSIUM SERPL-SCNC: 3.6 MMOL/L (ref 3.5–5.3)
RBC # BLD AUTO: 5.23 X10*6/UL (ref 4.5–5.9)
SODIUM SERPL-SCNC: 139 MMOL/L (ref 136–145)
WBC # BLD AUTO: 8.5 X10*3/UL (ref 4.4–11.3)

## 2024-11-04 PROCEDURE — 85025 COMPLETE CBC W/AUTO DIFF WBC: CPT

## 2024-11-04 PROCEDURE — 36415 COLL VENOUS BLD VENIPUNCTURE: CPT

## 2024-11-04 PROCEDURE — 93005 ELECTROCARDIOGRAM TRACING: CPT

## 2024-11-04 PROCEDURE — 80048 BASIC METABOLIC PNL TOTAL CA: CPT

## 2024-11-04 ASSESSMENT — DUKE ACTIVITY SCORE INDEX (DASI)
CAN YOU DO MODERATE WORK AROUND THE HOUSE LIKE VACUUMING, SWEEPING FLOORS OR CARRYING GROCERIES: YES
CAN YOU HAVE SEXUAL RELATIONS: NO
DASI METS SCORE: 7.6
CAN YOU WALK INDOORS, SUCH AS AROUND YOUR HOUSE: YES
TOTAL_SCORE: 39.45
CAN YOU WALK A BLOCK OR TWO ON LEVEL GROUND: YES
CAN YOU DO YARD WORK LIKE RAKING LEAVES, WEEDING OR PUSHING A MOWER: YES
CAN YOU PARTICIPATE IN MODERATE RECREATIONAL ACTIVITIES LIKE GOLF, BOWLING, DANCING, DOUBLES TENNIS OR THROWING A BASEBALL OR FOOTBALL: NO
CAN YOU RUN A SHORT DISTANCE: YES
CAN YOU PARTICIPATE IN STRENOUS SPORTS LIKE SWIMMING, SINGLES TENNIS, FOOTBALL, BASKETBALL, OR SKIING: NO
CAN YOU DO LIGHT WORK AROUND THE HOUSE LIKE DUSTING OR WASHING DISHES: YES
CAN YOU CLIMB A FLIGHT OF STAIRS OR WALK UP A HILL: YES
CAN YOU TAKE CARE OF YOURSELF (EAT, DRESS, BATHE, OR USE TOILET): YES
CAN YOU DO HEAVY WORK AROUND THE HOUSE LIKE SCRUBBING FLOORS OR LIFTING AND MOVING HEAVY FURNITURE: YES

## 2024-11-04 ASSESSMENT — ENCOUNTER SYMPTOMS
GASTROINTESTINAL NEGATIVE: 1
NECK NEGATIVE: 1
CONSTITUTIONAL NEGATIVE: 1
NEUROLOGICAL NEGATIVE: 1
CARDIOVASCULAR NEGATIVE: 1
EYES NEGATIVE: 1
RESPIRATORY NEGATIVE: 1
ENDOCRINE NEGATIVE: 1

## 2024-11-04 ASSESSMENT — PAIN DESCRIPTION - DESCRIPTORS: DESCRIPTORS: ACHING;SHOOTING;SHARP

## 2024-11-04 ASSESSMENT — PAIN SCALES - GENERAL: PAINLEVEL_OUTOF10: 7

## 2024-11-04 ASSESSMENT — PAIN - FUNCTIONAL ASSESSMENT: PAIN_FUNCTIONAL_ASSESSMENT: 0-10

## 2024-11-04 ASSESSMENT — LIFESTYLE VARIABLES: SMOKING_STATUS: NONSMOKER

## 2024-11-04 NOTE — PREPROCEDURE INSTRUCTIONS
Medication List            Accurate as of November 4, 2024  3:32 PM. Always use your most recent med list.                AdviL 200 mg tablet  Generic drug: ibuprofen  Additional Medication Adjustments for Surgery: Take last dose 7 days before surgery  Notes to patient: Last dose 11/5/24     amLODIPine 2.5 mg tablet  Commonly known as: Norvasc  Medication Adjustments for Surgery: Take on the morning of surgery  Notes to patient: If a morning medication     ascorbic acid 500 mg tablet  Commonly known as: Vitamin C  Take 1 tablet (500 mg) by mouth once daily.  Additional Medication Adjustments for Surgery: Take last dose 7 days before surgery  Notes to patient: Last dose 11/5/24     aspirin 81 mg chewable tablet  Medication Adjustments for Surgery: Do Not take on the morning of surgery     ergocalciferol 1.25 MG (94210 UT) capsule  Commonly known as: Vitamin D-2  Additional Medication Adjustments for Surgery: Take last dose 7 days before surgery  Notes to patient: Last dose 11/5/24     gabapentin 100 mg capsule  Commonly known as: Neurontin  Take 1 capsule (100 mg) by mouth 3 times a day. Titration schedule handed to patient  Medication Adjustments for Surgery: Take on the morning of surgery  Notes to patient: If a morning medication     pregabalin 25 mg capsule  Commonly known as: Lyrica  Take 1 capsule (25 mg) by mouth 3 times a day. Discontinue gabapentin, Titration schedule handed to patient  Medication Adjustments for Surgery: Take on the morning of surgery  Notes to patient: If a morning medication     rosuvastatin 5 mg tablet  Commonly known as: Crestor  Medication Adjustments for Surgery: Take on the morning of surgery  Notes to patient: If a morning medication     traMADol 50 mg tablet  Commonly known as: Ultram  Medication Adjustments for Surgery: Take/Use as prescribed            If no issues with your liver you may take Tylenol 2-500 mg tablets every 8 hrs around the clock for pain including morning of  surgery with a sip of water    STOP VITAMINS, SUPPLEMENTS, HERBS, PROBIOTICS, AND FISH OIL, NO MOTRIN OR ADVIL OR ALEVE 7 DAYS BEFORE SURGERY    IF YOU HAVE A CPAP MACHINE BRING ON DAY OF SURGERY     CONTACT SURGEON'S OFFICE IF YOU DEVELOP:  * Fever = 100.4 F   * New respiratory symptoms (e.g. cough, shortness of breath, respiratory distress, sore throat)  * Recent loss of taste or smell  *Flu like symptoms such as headache, fatigue or gastrointestinal symptoms  * You develop any open sores, shingles, burning or painful urination   AND/OR:  * You no longer wish to have the surgery.  * Any other personal circumstances change that may lead to the need to cancel or defer this surgery.  *You were admitted to any hospital within one week of your planned procedure.     SMOKING:  *Quitting smoking can make a huge difference to your health and recovery from surgery.    *If you need help with quitting, call 1-172-QUIT-NOW.     Additional Instructions:      Seven/Six Days before Surgery:  Review your medication instructions, stop indicated medications  Five Days before Surgery:  Review your medication instructions, stop indicated medications  Begin using your Hibiclens, if prescribed  Three Days before Surgery:  Review your medication instructions, stop indicated medications  The Day before Surgery:  Start using 0.12% CHG mouthwash-if prescribed  No smoking or alcohol use 24 hours before surgery  Review your medication instructions, stop indicated medications  You will be contacted regarding the time of your arrival to facility and surgery time  Do not eat any food after Midnight  Day of Surgery:  Review your medication instructions, take indicated medications  If you have diabetes, please check your fasting blood sugar upon awakening.  If fasting blood sugar is <80 mg/dl, drink 100 ml of apple juice, time limit of 2 hours before     SURGICAL TIME:  *You will be contacted between 2 p.m. and 3 p.m. the business day before your  surgery with your arrival time.  *If you haven't received a call by 3pm, call [phone]  *Scheduled surgery times may change and you will be notified if this occurs-check your personal voicemail for any updates.     ON THE MORNING OF SURGERY:  *Wear comfortable, loose fitting clothing.   *Do not use moisturizers, creams, lotions or perfume.  *All jewelry and valuables should be left at home.  *Prosthetic devices such as contact lenses, hearing aids, dentures, eyelash extensions, hairpins and body piercing must be removed before surgery.     BRING WITH YOU:  *Photo ID and insurance card  *Current list of medications and allergies  *Pacemaker/Defibrillator/Heart stent cards  *CPAP machine and mask  *Slings/splints/crutches  *Copy of your complete Advanced Directive/DHPOA-if applicable  *Neurostimulator implant remote     PARKING AND ARRIVAL:  *Check in at the Main Entrance desk and let them know you are here for surgery.     IF YOU ARE HAVING OUTPATIENT/SAME DAY SURGERY:  *A responsible adult MUST accompany you at the time of discharge and stay with you for 24 hours after your surgery.  *You may NOT drive yourself home after surgery.  *You may use a taxi or ride sharing service ([employer], [employer]) to return home ONLY if you are accompanied by a friend or family member.  *Instructions for resuming your medications will be provided by your surgeon.    Preoperative Fasting Guidelines     When do I need to stop eating before my surgery?  Do not eat any food after midnight the night before your surgery/procedure.    You may have up to 12 ounces of clear liquid until TWO hours before your instructed arrival time to the hospital.  This includes water, black tea/coffee, (no milk or cream) apple juice, and electrolyte drinks (Gatorade)  You may chew gum until TWO hours before your surgery/procedure    Preoperative Brain Exercises     What are brain exercises?  A brain exercise is any activity that engages your thinking  (cognitive) skills.     What types of activities are considered brain exercises?  Jigsaw puzzles, crossword puzzles, word jumble, memory games, word search, and many more.  Many can be found free online or on your phone via a mobile feliz.     Why should I do brain exercises before my surgery?  More recent research has shown brain exercise before surgery can lower the risk of postoperative delirium (confusion) which can be especially important for older adults.  Patients who did brain exercises for 5 to 10 hours the days before surgery, cut their risk of postoperative delirium in half up to 1 week after surgery.                 [facility]   Preoperative Deep Breathing Exercises     Why it is important to do deep breathing exercises before my surgery?  Deep breathing exercises strengthen your breathing muscles.  This helps you to recover after your surgery and decreases the chance of breathing complications.        How are the deep breathing exercises done?  Sit straight with your back supported.  Breathe in deeply and slowly through your nose. Your lower rib cage should expand and your abdomen may move forward.  Hold that breath for 3 to 5 seconds.  Breathe out through pursed lips, slowly and completely.  Rest and repeat 10 times every hour while awake.  Rest longer if you become dizzy or lightheaded.                [facility]   Preoperative Deep Breathing Exercises     Why it is important to do deep breathing exercises before my surgery?  Deep breathing exercises strengthen your breathing muscles.  This helps you to recover after your surgery and decreases the chance of breathing complications.        How are the deep breathing exercises done?  Sit straight with your back supported.  Breathe in deeply and slowly through your nose. Your lower rib cage should expand and your abdomen may move forward.  Hold that breath for 3 to 5 seconds.  Breathe out through pursed  lips, slowly and completely.  Rest and repeat 10 times every hour while awake.  Rest longer if you become dizzy or lightheaded.        Patient Information: Incentive Spirometer  What is an incentive spirometer?  An incentive spirometer is a device used before and after surgery to “exercise” your lungs.  It helps you to take deeper breaths to expand your lungs.  Below is an example of a basic incentive spirometer.  The device you receive may differ slightly but they all function the same.    Why do I need to use an incentive spirometer?  Using your incentive spirometer prepares your lungs for surgery and helps prevent lung problems after surgery.  How do I use my incentive spirometer?  When you're using your incentive spirometer, make sure to breathe through your mouth. If you breathe through your nose, the incentive spirometer won't work properly. You can hold your nose if you have trouble.  If you feel dizzy at any time, stop and rest. Try again at a later time.  Follow the steps below:  Set up your incentive spirometer, expand the flexible tubing and connect to the outlet.  Sit upright in a chair or bed. Hold the incentive spirometer at eye level.   Put the mouthpiece in your mouth and close your lips tightly around it. Slowly breathe out (exhale) completely.  Breathe in (inhale) slowly through your mouth as deeply as you can. As you take a breath, you will see the piston rise inside the large column. While the piston rises, the indicator should move upwards. It should stay in between the 2 arrows (see Figure).  Try to get the piston as high as you can, while keeping the indicator between the arrows.   If the indicator doesn't stay between the arrows, you're breathing either too fast or too slow.  When you get it as high as you can, hold your breath for 10 seconds, or as long as possible. While you're holding your breath, the piston will slowly fall to the base of the spirometer.  Once the piston reaches the  bottom of the spirometer, breathe out slowly through your mouth. Rest for a few seconds.  Repeat 10 times. Try to get the piston to the same level with each breath.  Repeat every hour while awake  You can carefully clean the outside of the mouthpiece with an alcohol wipe or soap and water.       Patient and Family Education        Ways You Can Help Prevent Blood Clots                 This handout explains some simple things you can do to help prevent blood clots.      Blood clots are blockages that can form in the body's veins. When a blood clot forms in your deep veins, it may be called a deep vein thrombosis, or DVT for short. Blood clots can happen in any part of the body where blood flows, but they are most common in the arms and legs. If a piece of a blood clot breaks free and travels to the lungs, it is called a pulmonary embolus (PE). A PE can be a very serious problem.       Being in the hospital or having surgery can raise your chances of getting a blood clot because you may not be well enough to move around as much as you normally do.         Ways you can help prevent blood clots in the hospital           Wearing SCDs. SCDs stands for Sequential Compression Devices.   SCDs are special sleeves that wrap around your legs  They attach to a pump that fills them with air to gently squeeze your legs every few minutes.   This helps return the blood in your legs to your heart.   SCDs should only be taken off when walking or bathing.   SCDs may not be comfortable, but they can help save your life.                                            Wearing compression stockings - if your doctor orders them. These special snug fitting stockings gently squeeze your legs to help blood flow.       Walking. Walking helps move the blood in your legs.   If your doctor says it is ok, try walking the halls at least   5 times a day. Ask us to help you get up, so you don't fall.      Taking any blood thinning medicines your doctor  orders.        Page 1 of 2            [facility]; 3/23   Ways you can help prevent blood clots at home         Wearing compression stockings - if your doctor orders them. ? Walking - to help move the blood in your legs.       Taking any blood thinning medicines your doctor orders.      Signs of a blood clot or PE        Tell your doctor or nurse know right away if you have of the problems listed below.    If you are at home, seek medical care right away. Call 911 for chest pain or problems breathing.                Signs of a blood clot (DVT) - such as pain,  swelling, redness or warmth in your arm or leg      Signs of a pulmonary embolism (PE) - such as chest pain or feeling short of breath       Thank you for coming to Pre Admission testing.      If I have prescribe medication please don't forget to  at your pharmacy.      Any questions about today's visit call [phone] and leave a message in the general mailbox.     Patient instructed to ambulate as soon as possible postoperatively to decrease thromboembolic risk.     Savanah Dhillon, APRN-CNP     Thank you for visiting [facility].  If you have any changes to your health condition, please call the surgeons office to alert them and give them details of your symptoms.

## 2024-11-05 LAB
ATRIAL RATE: 70 BPM
P AXIS: 45 DEGREES
P OFFSET: 149 MS
P ONSET: 95 MS
PR INTERVAL: 238 MS
Q ONSET: 214 MS
QRS COUNT: 11 BEATS
QRS DURATION: 82 MS
QT INTERVAL: 364 MS
QTC CALCULATION(BAZETT): 393 MS
QTC FREDERICIA: 383 MS
R AXIS: -19 DEGREES
T AXIS: 58 DEGREES
T OFFSET: 396 MS
VENTRICULAR RATE: 70 BPM

## 2024-11-13 ENCOUNTER — ANESTHESIA EVENT (OUTPATIENT)
Dept: OPERATING ROOM | Facility: HOSPITAL | Age: 74
End: 2024-11-13
Payer: COMMERCIAL

## 2024-11-13 ENCOUNTER — ANESTHESIA (OUTPATIENT)
Dept: OPERATING ROOM | Facility: HOSPITAL | Age: 74
End: 2024-11-13
Payer: COMMERCIAL

## 2024-11-13 ENCOUNTER — HOSPITAL ENCOUNTER (OUTPATIENT)
Facility: HOSPITAL | Age: 74
Setting detail: OUTPATIENT SURGERY
Discharge: HOME | End: 2024-11-13
Attending: ORTHOPAEDIC SURGERY | Admitting: ORTHOPAEDIC SURGERY
Payer: COMMERCIAL

## 2024-11-13 VITALS
RESPIRATION RATE: 15 BRPM | HEART RATE: 66 BPM | WEIGHT: 230.38 LBS | DIASTOLIC BLOOD PRESSURE: 80 MMHG | SYSTOLIC BLOOD PRESSURE: 143 MMHG | HEIGHT: 75 IN | TEMPERATURE: 96.8 F | BODY MASS INDEX: 28.65 KG/M2 | OXYGEN SATURATION: 99 %

## 2024-11-13 DIAGNOSIS — S67.22XS CRUSHING INJURY OF LEFT HAND, SEQUELA: ICD-10-CM

## 2024-11-13 DIAGNOSIS — S62.615B OPEN DISPLACED FRACTURE OF PROXIMAL PHALANX OF LEFT RING FINGER, INITIAL ENCOUNTER: Primary | ICD-10-CM

## 2024-11-13 DIAGNOSIS — G89.4 CHRONIC PAIN SYNDROME: ICD-10-CM

## 2024-11-13 PROCEDURE — 7100000010 HC PHASE TWO TIME - EACH INCREMENTAL 1 MINUTE: Performed by: ORTHOPAEDIC SURGERY

## 2024-11-13 PROCEDURE — 3600000008 HC OR TIME - EACH INCREMENTAL 1 MINUTE - PROCEDURE LEVEL THREE: Performed by: ORTHOPAEDIC SURGERY

## 2024-11-13 PROCEDURE — 3600000003 HC OR TIME - INITIAL BASE CHARGE - PROCEDURE LEVEL THREE: Performed by: ORTHOPAEDIC SURGERY

## 2024-11-13 PROCEDURE — 7100000009 HC PHASE TWO TIME - INITIAL BASE CHARGE: Performed by: ORTHOPAEDIC SURGERY

## 2024-11-13 PROCEDURE — A26910 PR AMPUTATE METACARPAL+FINGER: Performed by: STUDENT IN AN ORGANIZED HEALTH CARE EDUCATION/TRAINING PROGRAM

## 2024-11-13 PROCEDURE — 3700000001 HC GENERAL ANESTHESIA TIME - INITIAL BASE CHARGE: Performed by: ORTHOPAEDIC SURGERY

## 2024-11-13 PROCEDURE — 99100 ANES PT EXTEME AGE<1 YR&>70: CPT | Performed by: STUDENT IN AN ORGANIZED HEALTH CARE EDUCATION/TRAINING PROGRAM

## 2024-11-13 PROCEDURE — 2500000004 HC RX 250 GENERAL PHARMACY W/ HCPCS (ALT 636 FOR OP/ED): Performed by: ORTHOPAEDIC SURGERY

## 2024-11-13 PROCEDURE — 2500000004 HC RX 250 GENERAL PHARMACY W/ HCPCS (ALT 636 FOR OP/ED): Performed by: PHYSICIAN ASSISTANT

## 2024-11-13 PROCEDURE — 7100000002 HC RECOVERY ROOM TIME - EACH INCREMENTAL 1 MINUTE: Performed by: ORTHOPAEDIC SURGERY

## 2024-11-13 PROCEDURE — 7100000001 HC RECOVERY ROOM TIME - INITIAL BASE CHARGE: Performed by: ORTHOPAEDIC SURGERY

## 2024-11-13 PROCEDURE — 2720000007 HC OR 272 NO HCPCS: Performed by: ORTHOPAEDIC SURGERY

## 2024-11-13 PROCEDURE — 2500000004 HC RX 250 GENERAL PHARMACY W/ HCPCS (ALT 636 FOR OP/ED): Performed by: NURSE ANESTHETIST, CERTIFIED REGISTERED

## 2024-11-13 PROCEDURE — A26910 PR AMPUTATE METACARPAL+FINGER: Performed by: NURSE ANESTHETIST, CERTIFIED REGISTERED

## 2024-11-13 PROCEDURE — 2500000005 HC RX 250 GENERAL PHARMACY W/O HCPCS: Performed by: ORTHOPAEDIC SURGERY

## 2024-11-13 PROCEDURE — 2500000004 HC RX 250 GENERAL PHARMACY W/ HCPCS (ALT 636 FOR OP/ED): Mod: JZ | Performed by: PHYSICIAN ASSISTANT

## 2024-11-13 PROCEDURE — 3700000002 HC GENERAL ANESTHESIA TIME - EACH INCREMENTAL 1 MINUTE: Performed by: ORTHOPAEDIC SURGERY

## 2024-11-13 PROCEDURE — 2500000005 HC RX 250 GENERAL PHARMACY W/O HCPCS: Performed by: NURSE ANESTHETIST, CERTIFIED REGISTERED

## 2024-11-13 RX ORDER — ALBUTEROL SULFATE 0.83 MG/ML
2.5 SOLUTION RESPIRATORY (INHALATION) ONCE
Status: DISCONTINUED | OUTPATIENT
Start: 2024-11-13 | End: 2024-11-13 | Stop reason: HOSPADM

## 2024-11-13 RX ORDER — FENTANYL CITRATE 50 UG/ML
25 INJECTION, SOLUTION INTRAMUSCULAR; INTRAVENOUS EVERY 5 MIN PRN
Status: DISCONTINUED | OUTPATIENT
Start: 2024-11-13 | End: 2024-11-13 | Stop reason: HOSPADM

## 2024-11-13 RX ORDER — LIDOCAINE HYDROCHLORIDE 10 MG/ML
INJECTION, SOLUTION INFILTRATION; PERINEURAL AS NEEDED
Status: DISCONTINUED | OUTPATIENT
Start: 2024-11-13 | End: 2024-11-13 | Stop reason: HOSPADM

## 2024-11-13 RX ORDER — MIDAZOLAM HYDROCHLORIDE 1 MG/ML
INJECTION, SOLUTION INTRAMUSCULAR; INTRAVENOUS AS NEEDED
Status: DISCONTINUED | OUTPATIENT
Start: 2024-11-13 | End: 2024-11-13

## 2024-11-13 RX ORDER — CEFAZOLIN SODIUM 2 G/100ML
2 INJECTION, SOLUTION INTRAVENOUS ONCE
Status: COMPLETED | OUTPATIENT
Start: 2024-11-13 | End: 2024-11-13

## 2024-11-13 RX ORDER — BUPIVACAINE HYDROCHLORIDE 5 MG/ML
INJECTION, SOLUTION PERINEURAL AS NEEDED
Status: DISCONTINUED | OUTPATIENT
Start: 2024-11-13 | End: 2024-11-13 | Stop reason: HOSPADM

## 2024-11-13 RX ORDER — ACETAMINOPHEN 325 MG/1
650 TABLET ORAL EVERY 4 HOURS PRN
Status: DISCONTINUED | OUTPATIENT
Start: 2024-11-13 | End: 2024-11-13 | Stop reason: HOSPADM

## 2024-11-13 RX ORDER — NORETHINDRONE AND ETHINYL ESTRADIOL 0.5-0.035
KIT ORAL AS NEEDED
Status: DISCONTINUED | OUTPATIENT
Start: 2024-11-13 | End: 2024-11-13

## 2024-11-13 RX ORDER — DOXYCYCLINE 100 MG/1
100 CAPSULE ORAL 2 TIMES DAILY
Qty: 14 CAPSULE | Refills: 0 | Status: SHIPPED | OUTPATIENT
Start: 2024-11-13 | End: 2024-11-20

## 2024-11-13 RX ORDER — OXYCODONE HYDROCHLORIDE 5 MG/1
10 TABLET ORAL EVERY 4 HOURS PRN
Status: DISCONTINUED | OUTPATIENT
Start: 2024-11-13 | End: 2024-11-13 | Stop reason: HOSPADM

## 2024-11-13 RX ORDER — PROPOFOL 10 MG/ML
INJECTION, EMULSION INTRAVENOUS AS NEEDED
Status: DISCONTINUED | OUTPATIENT
Start: 2024-11-13 | End: 2024-11-13

## 2024-11-13 RX ORDER — SODIUM CHLORIDE, SODIUM LACTATE, POTASSIUM CHLORIDE, CALCIUM CHLORIDE 600; 310; 30; 20 MG/100ML; MG/100ML; MG/100ML; MG/100ML
100 INJECTION, SOLUTION INTRAVENOUS CONTINUOUS
Status: DISCONTINUED | OUTPATIENT
Start: 2024-11-13 | End: 2024-11-13 | Stop reason: HOSPADM

## 2024-11-13 RX ORDER — SODIUM CHLORIDE, SODIUM LACTATE, POTASSIUM CHLORIDE, CALCIUM CHLORIDE 600; 310; 30; 20 MG/100ML; MG/100ML; MG/100ML; MG/100ML
50 INJECTION, SOLUTION INTRAVENOUS CONTINUOUS
Status: DISCONTINUED | OUTPATIENT
Start: 2024-11-13 | End: 2024-11-13 | Stop reason: HOSPADM

## 2024-11-13 RX ORDER — LIDOCAINE HYDROCHLORIDE 10 MG/ML
INJECTION, SOLUTION EPIDURAL; INFILTRATION; INTRACAUDAL; PERINEURAL AS NEEDED
Status: DISCONTINUED | OUTPATIENT
Start: 2024-11-13 | End: 2024-11-13

## 2024-11-13 RX ORDER — FENTANYL CITRATE 50 UG/ML
50 INJECTION, SOLUTION INTRAMUSCULAR; INTRAVENOUS EVERY 5 MIN PRN
Status: DISCONTINUED | OUTPATIENT
Start: 2024-11-13 | End: 2024-11-13 | Stop reason: HOSPADM

## 2024-11-13 RX ORDER — ONDANSETRON HYDROCHLORIDE 2 MG/ML
INJECTION, SOLUTION INTRAVENOUS AS NEEDED
Status: DISCONTINUED | OUTPATIENT
Start: 2024-11-13 | End: 2024-11-13

## 2024-11-13 RX ORDER — BACITRACIN ZINC 500 UNIT/G
OINTMENT IN PACKET (EA) TOPICAL AS NEEDED
Status: DISCONTINUED | OUTPATIENT
Start: 2024-11-13 | End: 2024-11-13 | Stop reason: HOSPADM

## 2024-11-13 RX ORDER — OXYCODONE HYDROCHLORIDE 5 MG/1
5 TABLET ORAL EVERY 4 HOURS PRN
Status: DISCONTINUED | OUTPATIENT
Start: 2024-11-13 | End: 2024-11-13 | Stop reason: HOSPADM

## 2024-11-13 RX ORDER — OXYCODONE AND ACETAMINOPHEN 5; 325 MG/1; MG/1
1 TABLET ORAL EVERY 8 HOURS PRN
Qty: 15 TABLET | Refills: 0 | Status: SHIPPED | OUTPATIENT
Start: 2024-11-13 | End: 2024-11-18

## 2024-11-13 RX ORDER — FENTANYL CITRATE 50 UG/ML
INJECTION, SOLUTION INTRAMUSCULAR; INTRAVENOUS AS NEEDED
Status: DISCONTINUED | OUTPATIENT
Start: 2024-11-13 | End: 2024-11-13

## 2024-11-13 RX ORDER — ONDANSETRON HYDROCHLORIDE 2 MG/ML
4 INJECTION, SOLUTION INTRAVENOUS ONCE AS NEEDED
Status: DISCONTINUED | OUTPATIENT
Start: 2024-11-13 | End: 2024-11-13 | Stop reason: HOSPADM

## 2024-11-13 ASSESSMENT — PAIN DESCRIPTION - DESCRIPTORS: DESCRIPTORS: BURNING;SHARP

## 2024-11-13 ASSESSMENT — PAIN - FUNCTIONAL ASSESSMENT
PAIN_FUNCTIONAL_ASSESSMENT: 0-10
PAIN_FUNCTIONAL_ASSESSMENT: FLACC (FACE, LEGS, ACTIVITY, CRY, CONSOLABILITY)

## 2024-11-13 ASSESSMENT — PAIN SCALES - GENERAL
PAINLEVEL_OUTOF10: 0 - NO PAIN
PAINLEVEL_OUTOF10: 8

## 2024-11-13 ASSESSMENT — COLUMBIA-SUICIDE SEVERITY RATING SCALE - C-SSRS
2. HAVE YOU ACTUALLY HAD ANY THOUGHTS OF KILLING YOURSELF?: NO
6. HAVE YOU EVER DONE ANYTHING, STARTED TO DO ANYTHING, OR PREPARED TO DO ANYTHING TO END YOUR LIFE?: NO
1. IN THE PAST MONTH, HAVE YOU WISHED YOU WERE DEAD OR WISHED YOU COULD GO TO SLEEP AND NOT WAKE UP?: NO

## 2024-11-13 NOTE — ANESTHESIA PREPROCEDURE EVALUATION
Patient: Khurram Nicole    Procedure Information       Date/Time: 11/13/24 1005    Procedure: Amputation Digit Hand (Left: Hand)    Location: [facility] / [facility]    Surgeons: Kevin Quinones MD            Relevant Problems   Cardiac   (+) Benign essential HTN   (+) Coronary artery disease   (+) Hypercholesterolemia      Neuro   (+) Complex regional pain syndrome of left upper extremity      Musculoskeletal   (+) Complex regional pain syndrome of left upper extremity       Clinical information reviewed:   Tobacco  Allergies    Med Hx  Surg Hx   Fam Hx  Soc Hx        NPO Detail:  NPO/Void Status  Date of Last Liquid: 11/13/24  Time of Last Liquid: 0600  Date of Last Solid: 11/12/24  Time of Last Solid: 2000  Time of Last Void: 0850         Physical Exam    Airway  Mallampati: II  TM distance: >3 FB  Neck ROM: limited     Cardiovascular   Rhythm: regular  Rate: normal     Dental    Pulmonary   Breath sounds clear to auscultation     Abdominal            Anesthesia Plan    History of general anesthesia?: yes  History of complications of general anesthesia?: no    ASA 3     general     intravenous induction   Postoperative administration of opioids is intended.  Trial extubation is planned.  Anesthetic plan and risks discussed with patient.  Use of blood products discussed with patient who.

## 2024-11-13 NOTE — ANESTHESIA PROCEDURE NOTES
Airway  Date/Time: 11/13/2024 11:19 AM  Urgency: elective      Staffing  Performed: CRNA   Authorized by: Sabina Peñaloza MD    Performed by: KAMILLE Hinson-CRNA, BABATUNDE  Patient location during procedure: OR    Indications and Patient Condition  Indications for airway management: anesthesia  Spontaneous Ventilation: absent  Sedation level: deep  Preoxygenated: yes  Patient position: sniffing  Mask difficulty assessment: 0 - not attempted    Final Airway Details  Final airway type: supraglottic airway      Successful airway: Size 5     Number of attempts at approach: 1

## 2024-11-13 NOTE — ANESTHESIA POSTPROCEDURE EVALUATION
Patient: Khurram Nicole    Procedure Summary       Date: 11/13/24 Room / Location: [facility] / [facility]    Anesthesia Start: 1107 Anesthesia Stop: 1213    Procedure: Amputation Digit Hand (Left: Hand) Diagnosis:       Open displaced fracture of proximal phalanx of left ring finger, initial encounter      (Open displaced fracture of proximal phalanx of left ring finger, initial encounter [S62.615B])    Surgeons: Kevin Quinones MD Responsible Provider: Sabina Peñaloza MD    Anesthesia Type: general ASA Status: 3            Anesthesia Type: general    Vitals Value Taken Time   /62 11/13/24 1213   Temp 36 11/13/24 1213   Pulse 67 11/13/24 1213   Resp 11 11/13/24 1213   SpO2 97 11/13/24 1213       Anesthesia Post Evaluation    Patient location during evaluation: PACU  Patient participation: waiting for patient participation  Level of consciousness: sedated  Pain management: adequate  Airway patency: patent  Cardiovascular status: acceptable and hemodynamically stable  Respiratory status: acceptable, spontaneous ventilation and face mask  Hydration status: acceptable  Postoperative Nausea and Vomiting: none      There were no known notable events for this encounter.

## 2024-11-13 NOTE — PERIOPERATIVE NURSING NOTE
Patient alert and oriented. Surgical site clean, dry and intact with arm elevated. Patient denies pain at this time. Good cap refill and warm to touch left fingers. Patient able to wiggle fingers on left hand. VS stable. Tolerating PO fluids with no issues of nausea/vomiting.

## 2024-11-13 NOTE — DISCHARGE INSTRUCTIONS
You had hand surgery today.  Local anesthesia was used during the procedure and you may have some numbness in the region for a few hours postoperatively.    Please leave the dressing intact after surgery, if it gets too tight you may loosen the Ace wrap and cotton underneath but do not remove fully.    It is important to elevate the hand for the next 3 to 5 days, and move your fingers fully.  It is very normal to have some bruising in the region and it may travel down the forearm.    If able, you can take 800 mg of ibuprofen along with the prescription pain medication, you can alternate these every 4 hours and slowly wean off of the prescription pain medication.    Please call the office for a postop appointment in 10 to 14 days after surgery. You should be seen in therapy about a week post-op

## 2024-11-13 NOTE — OP NOTE
Amputation Digit Hand (L) Operative Note     Date: 2024  OR Location: [facility]    Name: Khurram Nicole, : 1950, Age: 73 y.o., MRN: [id number], Sex: male    Diagnosis  Pre-op Diagnosis      * Open displaced fracture of proximal phalanx of left ring finger, initial encounter [Y82.120M] Post-op Diagnosis     * Open displaced fracture of proximal phalanx of left ring finger, initial encounter [S62.612K]     Procedures  Amputation Digit Hand  10507 - CO AMP MTCRPL W/FINGER/THUMB W/WO INTEROSS TRANSFER    Ray amputation left ring finger for severely painful and contracted digit  Surgeons      * Kevin Quinones - Primary    Resident/Fellow/Other Assistant:  Surgeons and Role:  * No surgeons found with a matching role *  Sugey Payan PA-C  Staff:   Circulator: Manda  Scrub Person: Wilma Osborneub Person: May  Relief Circulator: Erica    Anesthesia Staff: Anesthesiologist: Sabina Peñaloza MD  CRNA: INOCENTE Hinson DNP    Procedure Summary  Anesthesia: General  ASA: III  Estimated Blood Loss: 5mL  Intra-op Medications:   Administrations occurring from 1005 to 1105 on 24:   Medication Name Total Dose   lactated Ringer's infusion 1.67 mL              Anesthesia Record               Intraprocedure I/O Totals          Intake    lactated Ringer's infusion 800.00 mL    Total Intake 800 mL          Specimen: No specimens collected              Drains and/or Catheters: * None in log *    Tourniquet Times:     Total Tourniquet Time Documented:  Arm - Lower (Left) - 23 minutes  Total: Arm - Lower (Left) - 23 minutes      Implants:     Findings: Contracted digit    Indications: Khurram Nicole is an 73 y.o. male who is having surgery for Open displaced fracture of proximal phalanx of left ring finger, initial encounter [T58.291S].  Pleasant gentleman comes in and again wishes to have this removed and we talked about this at length again he understands the risk of nerve artery tendon damage infection  continued pain phantom pain need for future surgery such as revision surgery for pain.  Wish to proceed understands postoperative care answered all preoperative questions wished to proceed.    The patient was seen in the preoperative area. The risks, benefits, complications, treatment options, non-operative alternatives, expected recovery and outcomes were discussed with the patient. The possibilities of reaction to medication, pulmonary aspiration, injury to surrounding structures, bleeding, recurrent infection, the need for additional procedures, failure to diagnose a condition, and creating a complication requiring transfusion or operation were discussed with the patient. The patient concurred with the proposed plan, giving informed consent.  The site of surgery was properly noted/marked if necessary per policy. The patient has been actively warmed in preoperative area. Preoperative antibiotics have been ordered and given within 1 hours of incision. Venous thrombosis prophylaxis have been ordered including bilateral sequential compression devices    Procedure Details: Pleasant gentleman brought the operating room and after sterilely prepping draping and forming a timeout we did a fishmouth type incision first finding the neurovascular bundles volarly and cut the nerves back we actually tied off both arteries because he had large arteries at the proper digital branch.  We then cut the flexor tendons more proximally and then went dorsal down to the CMC joint and left a small amount of the base of the metacarpal taking the pointed edge off after osteotomize in this.  We carefully took off the interossei muscle and carefully remove the digit.  We coagulated all bleeders we did chlorhexidine irrigation we used some 0 Vicryl to suture some of the interossei and intermetacarpal ligament to being the fingers together but no over abduction was performed we had good alignment let down the tourniquet prior we had good  hemostasis we did meticulous closure bulky dressing was placed all the fingers pinked up nicely there were no complications Sugey Payan PA-C acted as surgical assistant during this case and her assistance greatly reduced operative time and aided in performance of the case  Complications:  None; patient tolerated the procedure well.    Disposition: PACU - hemodynamically stable.  Condition: stable                 Additional Details: 0    Attending Attestation: I performed the procedure.    Kevin Quinones  Phone Number: [phone]

## 2024-11-22 ENCOUNTER — EVALUATION (OUTPATIENT)
Dept: OCCUPATIONAL THERAPY | Facility: HOSPITAL | Age: 74
End: 2024-11-22
Payer: COMMERCIAL

## 2024-11-22 DIAGNOSIS — S62.615B OPEN DISPLACED FRACTURE OF PROXIMAL PHALANX OF LEFT RING FINGER, INITIAL ENCOUNTER: Primary | ICD-10-CM

## 2024-11-22 DIAGNOSIS — G56.42 COMPLEX REGIONAL PAIN SYNDROME TYPE 2 OF LEFT UPPER EXTREMITY: ICD-10-CM

## 2024-11-22 PROCEDURE — 97110 THERAPEUTIC EXERCISES: CPT | Mod: GO | Performed by: OCCUPATIONAL THERAPIST

## 2024-11-22 PROCEDURE — 97165 OT EVAL LOW COMPLEX 30 MIN: CPT | Mod: GO | Performed by: OCCUPATIONAL THERAPIST

## 2024-11-22 PROCEDURE — 97166 OT EVAL MOD COMPLEX 45 MIN: CPT | Mod: GO | Performed by: OCCUPATIONAL THERAPIST

## 2024-11-22 ASSESSMENT — ENCOUNTER SYMPTOMS
DEPRESSION: 0
OCCASIONAL FEELINGS OF UNSTEADINESS: 0
LOSS OF SENSATION IN FEET: 0

## 2024-11-22 ASSESSMENT — PAIN SCALES - GENERAL: PAINLEVEL_OUTOF10: 6

## 2024-11-22 ASSESSMENT — PAIN - FUNCTIONAL ASSESSMENT: PAIN_FUNCTIONAL_ASSESSMENT: 0-10

## 2024-11-22 NOTE — PROGRESS NOTES
"    Occupational Therapy  Occupational Therapy Orthopedic Evaluation    Patient Name: Khurram Nicole  MRN: [id number]  Today's Date: 11/22/2024  Time Calculation  Start Time: 1415  Stop Time: 1500  Time Calculation (min): 45 min      Time:  Time Calculation  Start Time: 1415  Stop Time: 1500  Time Calculation (min): 45 min  OT Evaluation Time Entry  OT Evaluation (Moderate) Time Entry: 20  OT Therapeutic Procedures Time Entry  Therapeutic Exercise Time Entry: 25    Insurance:  Visit number: 1 of 16  Authorization info: 2-3x/wk through 12/31/24  Insurance Type: Payor: [employer] / Plan: [employer] / Product Type: *No Product type* /    General:  Reason for visit: s/p ray resection of L RF   Referred by: Jamin    Current Problem  1. Open displaced fracture of proximal phalanx of left ring finger, initial encounter  Referral to Occupational Therapy    Follow Up In Occupational Therapy      2. Complex regional pain syndrome type 2 of left upper extremity  Follow Up In Occupational Therapy          Precautions: +wound         Medical History Form: Reviewed (scanned into chart)    Subjective:   Chief Complaint: \"Pain\"  Onset: Patient initially had a crush injury to L hand on 11/6/23 and underwent a L LF and RF extensor tendon repair of both and an ORIF of RF on 11/9/23. Patient continued to have significant pain and stiffness with most recent surgery involving a ray resection of L RF on 11/13/24.   KEYONNA: Chronic   DOI/DOS: DOS=11/13/24      Hand Dominance: Right    Current Condition since injury:   better      PAIN  Pain Assessment: 0-10  0-10 (Numeric) Pain Score: 6  Pain Type: Acute pain  Pain Location: Hand  Pain Orientation: Left  Aggravating Factors: Finger/Thumb Flexion and Finger/Thumb extension   Relieving Factors: Rest and Elevation     Relevant Information (PMH & Previous Tests/Imaging): +CRPS, +Parkinson's Disease  Previous Interventions/Treatments: Physical Therapy/Occupational Therapy " "OT with discharge of 10/4/24.     Prior Level of Function (PLOF)  Exercise/Physical Activity: Patient reports being independent with all ADL's prior to injury.   Work/School: Patient is not working at his time.   Current ADL/IADL Status: All ADL's involving L hand are impaired.      Patients Living Environment: Reviewed and no concern    Primary Language: English    There are no spiritual/cultural practices/values/needs that are important to know      Pt goals for therapy: \"Use my hand again.\"    Red Flags: Do you have any of the following? No  Fever/chills, unexplained weight changes, dizziness/fainting, unexplained change in bowel or bladder functions, unexplained malaise or muscle weakness, night pain/sweats, numbness or tingling    Objective:  Evaluation Complexity=Moderate  Rehab Potential=Good     RIGHT HAND AROM (Degrees)   MCP PIP DIP GUIDRY DPC   IF      0   MF     0   RF     0   SF     0   Thumb WFL  WFL     Thumb RABD WFL       Thumb PABD WFL         LEFT HAND AROM (Degrees)   MCP PIP DIP GUIDRY DPC   IF  -20/60 -20/85 0/42 147    MF -20/60 -30/90 0/42 142    RF N/A N/A N/A     SF -15/65 0/70 0/48 168    Thumb WFL  WFL     Thumb RABD WFL       Thumb PABD WFL         General stiffness is noted in L wrist and FA.    Physical Observation: 13 cm incision with multiple intact sutures from dorsal hand wrapping into palm in RF space. No active drainage is noted. Bruising is noted throughout L hand.   Edema: moderate circumferential edema is noted throughout L hand.     Sensory: intact  Numbness/Tingling: none    Outcome Measures:  UEFI: 2/80    EDUCATION: home exercise program, plan of care, activity modifications, pain management, and injury pathology       Goals:  Active       OT Goals       Patient is independent with entire HEP.        Start:  11/22/24    Expected End:  12/22/24            Patient c/o 2/10 or less pain in L UE with use during ADL's and home exercises.        Start:  11/22/24    Expected End:  " 12/22/24            GUIDRY of L IF, LF, and JS=485 or more to assist with functional grasp.        Start:  11/22/24    Expected End:  12/22/24            L distal UE AROM is sufficient for independent completion of all ADL's and advanced ADL's.        Start:  11/22/24    Expected End:  01/21/25            L UE strength is sufficient for independent completion of all ADL's and advanced ADL's.        Start:  11/22/24    Expected End:  01/21/25              Resolved       OT Goals       Patient is independent with entire HEP.  (Met)       Start:  07/12/24    Expected End:  08/11/24    Resolved:  08/16/24         AROM: GUIDRY of L IF, LF, and WQ=374 or more to assist with ADL completion.  (Met)       Start:  07/12/24    Expected End:  08/11/24    Resolved:  08/16/24             Plan of care was developed with input and agreement by the patient    Treatments:     Therapeutic Exercise:   25 min  Therapeutic Exercise  Therapeutic Exercise Performed: Yes  Therapeutic Exercise Activity 1: tendon gliding x 10 reps (issued for HEP)  Therapeutic Exercise Activity 2: digit adduction x 10 (issued for HEP)    Wound Care:        Dressing was removed and wound was cleansed, dried, and lightly redressed.         Assessment: Patient is a 74 yo male  s/p L RF ray resection  resulting in limited participation in pain-free ADLs and inability to perform at their prior level of function. Pt would benefit from occupational therapy to address the impairments found & listed previously in the objective section in order to return to safe and pain-free ADLs and prior level of function.       Clinical Presentation: Stable and/or uncomplicated characteristics       Plan:      Planned Interventions include: therapeutic exercise, therapeutic activity, self-care home management, manual therapy, therapeutic activities, gait training, neuromuscular coordination, vasopneumatic, dry needling, aquatic therapy, electric stimulation, fluidotherapy, ultrasound,  kinesiotaping, orthosis fabrication, wound care  Frequency: 1-2 x Week  Duration: 8 Weeks  Rehab potential/prognosis: Good       Viola Villasenor, OT

## 2024-11-26 ENCOUNTER — OFFICE VISIT (OUTPATIENT)
Dept: ORTHOPEDIC SURGERY | Facility: CLINIC | Age: 74
End: 2024-11-26
Payer: COMMERCIAL

## 2024-11-26 DIAGNOSIS — S62.615B OPEN DISPLACED FRACTURE OF PROXIMAL PHALANX OF LEFT RING FINGER, INITIAL ENCOUNTER: Primary | ICD-10-CM

## 2024-11-26 PROCEDURE — 99211 OFF/OP EST MAY X REQ PHY/QHP: CPT | Performed by: ORTHOPAEDIC SURGERY

## 2024-11-26 ASSESSMENT — PAIN SCALES - GENERAL: PAINLEVEL_OUTOF10: 7

## 2024-11-26 ASSESSMENT — PAIN - FUNCTIONAL ASSESSMENT: PAIN_FUNCTIONAL_ASSESSMENT: 0-10

## 2024-11-27 NOTE — PROGRESS NOTES
Reason for Appointment  Chief Complaint   Patient presents with    Left Ring Finger - Post-op     History of Present Illness  Patient is here today 2 weeks s/p a left ring finger ray amputation on 11/13/24.  Wound is healing nicely with no signs of infection, sutures were removed today.  He does have significant stiffness in the digits otherwise and he is in therapy, I stressed the importance of regaining motion in the other digits so they do not get permanently stiff.  Pain has not significantly improved in the hand but this may be due to some early stiffness throughout the digits.  We will follow-up with him in 4 weeks.    Assessment   Left ring finger open fracture

## 2024-11-29 ENCOUNTER — TREATMENT (OUTPATIENT)
Dept: OCCUPATIONAL THERAPY | Facility: HOSPITAL | Age: 74
End: 2024-11-29
Payer: COMMERCIAL

## 2024-11-29 DIAGNOSIS — S62.615B OPEN DISPLACED FRACTURE OF PROXIMAL PHALANX OF LEFT RING FINGER, INITIAL ENCOUNTER: ICD-10-CM

## 2024-11-29 DIAGNOSIS — G56.42 COMPLEX REGIONAL PAIN SYNDROME TYPE 2 OF LEFT UPPER EXTREMITY: ICD-10-CM

## 2024-11-29 PROCEDURE — 97110 THERAPEUTIC EXERCISES: CPT | Mod: GO | Performed by: OCCUPATIONAL THERAPIST

## 2024-11-29 PROCEDURE — 97140 MANUAL THERAPY 1/> REGIONS: CPT | Mod: GO | Performed by: OCCUPATIONAL THERAPIST

## 2024-11-29 ASSESSMENT — PAIN - FUNCTIONAL ASSESSMENT: PAIN_FUNCTIONAL_ASSESSMENT: 0-10

## 2024-11-29 ASSESSMENT — PAIN SCALES - GENERAL: PAINLEVEL_OUTOF10: 7

## 2024-11-29 NOTE — PROGRESS NOTES
"    Occupational Therapy  Occupational Therapy Treatment    Patient Name: Khurram Nicole  MRN: [id number]  Today's Date: 11/29/2024  Time Calculation  Start Time: 1330  Stop Time: 1415  Time Calculation (min): 45 min      Time:  Time Calculation  Start Time: 1330  Stop Time: 1415  Time Calculation (min): 45 min  OT Therapeutic Procedures Time Entry  Manual Therapy Time Entry: 8  Therapeutic Exercise Time Entry: 37    Insurance:  Visit number: 2 of 16  Authorization info: C( expires 12/31/24  Insurance Type: Payor: [employer] / Plan: [employer]ATION / Product Type: *No Product type* /    General:  Reason for visit: L RF Ray Resection   Referred by: Jamin    Current Problem  1. Complex regional pain syndrome type 2 of left upper extremity  Follow Up In Occupational Therapy      2. Open displaced fracture of proximal phalanx of left ring finger, initial encounter  Follow Up In Occupational Therapy          Precautions   none      Subjective:   Patient reports \"It is all closed up.\"    Performing HEP?: Yes    Pain  Pain Assessment: 0-10  0-10 (Numeric) Pain Score: 7  Pain Type: Acute pain  Pain Location: Hand  Pain Orientation: Left  Post-tx=5/10    Objective:     AROM: L IF MP=-5/65, LF=-10/65, and SF=0/70    Physical Observation: Closed incision with no active drainage noted.   Edema: mild in circumferential L hand     Sensory: hypersensitivity to touch along surgical incision.       Treatments:     Modalities:        Modalities  Modalities Used: Yes  Modality 1: Untimed Fluidotherapy with AROM and sensory retraining promotion     Therapeutic Exercise:   37 min  Therapeutic Exercise  Therapeutic Exercise Performed: Yes  Therapeutic Exercise Activity 1: tendon glides x 10 reps  Therapeutic Exercise Activity 2: digit add/abd x 10  Therapeutic Exercise Activity 3: wrist E/F and UD x 10 reps each  Therapeutic Exercise Activity 4: wrist E/F x 10 reps each with FA sup/pron and intrinsics & " extrinsics    Manual Therapy:     8 min  Manual Therapy  Manual Therapy Performed: Yes  Manual Therapy Activity 1: scar massage and soft tissue mobilization    Assessment: Improved AROM is noted at all MP E/F of L IF, LF, and SF.       Plan: continue with plan of care 1-2x/wk       Viola Villasenor OT

## 2024-12-02 ENCOUNTER — TREATMENT (OUTPATIENT)
Dept: OCCUPATIONAL THERAPY | Facility: HOSPITAL | Age: 74
End: 2024-12-02
Payer: COMMERCIAL

## 2024-12-02 DIAGNOSIS — G56.42 COMPLEX REGIONAL PAIN SYNDROME TYPE 2 OF LEFT UPPER EXTREMITY: ICD-10-CM

## 2024-12-02 DIAGNOSIS — S62.615B OPEN DISPLACED FRACTURE OF PROXIMAL PHALANX OF LEFT RING FINGER, INITIAL ENCOUNTER: ICD-10-CM

## 2024-12-02 PROCEDURE — 97110 THERAPEUTIC EXERCISES: CPT | Mod: GO | Performed by: OCCUPATIONAL THERAPIST

## 2024-12-02 NOTE — PROGRESS NOTES
"    Occupational Therapy  Occupational Therapy Treatment    Patient Name: Khurram Nicole  MRN: [id number]  Today's Date: 12/2/2024     Insurance:  Visit number: 3 of 18  Authorization info: C( expires 12/31/24  Insurance Type: Payor: [employer] / Plan: [employer]ATION / Product Type: *No Product type* /    General:  Reason for visit: L RF Ray Resection   Referred by: Jamin  Time:  Time Calculation  Start Time: 1235  Stop Time: 1325  Time Calculation (min): 50 min  OT Therapeutic Procedures Time Entry  Manual Therapy Time Entry: 10  Therapeutic Exercise Time Entry: 40        Current Problem  1. Complex regional pain syndrome type 2 of left upper extremity  Follow Up In Occupational Therapy      2. Open displaced fracture of proximal phalanx of left ring finger, initial encounter  Follow Up In Occupational Therapy          Precautions   none      Subjective:   Patient reports \"I still get a decent amount of pain in the hand. I feel like I traded one kind of pain in for another kind. The cold bothers it.\"    Performing HEP?: Yes    Pain  7/10; 5/10 post treatment    Objective:     AROM: L IF MP=-5/65, LF=-10/65, and SF=0/70    Physical Observation: Closed incision with no active drainage noted.   Edema: mild in circumferential L hand     Sensory: hypersensitivity to touch along surgical incision.       Treatments:      Therapeutic Exercise:                         40 min  Therapeutic Exercise  Therapeutic Exercise Performed: Yes  Therapeutic Exercise Activity 1: tendon glides x 10 reps  Therapeutic Exercise Activity 2: digit add/abd x 10  Therapeutic Exercise Activity 3: wrist E/F and UD x 10 reps each  Therapeutic Exercise Activity 4: wrist E/F x 10 reps each with FA sup/pron and intrinsics & extrinsics  Therapeutic Exercise Activity 5: sponge pickup with in hand manipulation and transfer  Therapeutic Exercise Activity 6: digit extension slides on table top  Therapeutic Exercise Activity 7: digit " extension stretch with power web.  Therapeutic Exercise Activity 8: wrist maze  Digit AROM in dry warm medium to increase tissue extensibility and improve ROM.     Manual Therapy:                                           10 min  Manual Therapy  Manual Therapy Performed: Yes  Manual Therapy Activity 1: scar massage and soft tissue mobilization  Manual therapy Activity 2: Therapist performed manual digit flexion and extension stretches.    Assessment:    Progressed range of motion exercises. Patient reports good compliance with home program. Patient to follow up Friday.    Plan: continue with plan of care 1-2x/wk       Azeem Taveras OT

## 2024-12-06 ENCOUNTER — TREATMENT (OUTPATIENT)
Dept: OCCUPATIONAL THERAPY | Facility: HOSPITAL | Age: 74
End: 2024-12-06
Payer: COMMERCIAL

## 2024-12-06 DIAGNOSIS — S62.615B OPEN DISPLACED FRACTURE OF PROXIMAL PHALANX OF LEFT RING FINGER, INITIAL ENCOUNTER: ICD-10-CM

## 2024-12-06 DIAGNOSIS — G56.42 COMPLEX REGIONAL PAIN SYNDROME TYPE 2 OF LEFT UPPER EXTREMITY: ICD-10-CM

## 2024-12-06 PROCEDURE — 97110 THERAPEUTIC EXERCISES: CPT | Mod: GO | Performed by: OCCUPATIONAL THERAPIST

## 2024-12-06 PROCEDURE — 97140 MANUAL THERAPY 1/> REGIONS: CPT | Mod: GO | Performed by: OCCUPATIONAL THERAPIST

## 2024-12-06 ASSESSMENT — PAIN SCALES - GENERAL: PAINLEVEL_OUTOF10: 8

## 2024-12-06 ASSESSMENT — PAIN - FUNCTIONAL ASSESSMENT: PAIN_FUNCTIONAL_ASSESSMENT: 0-10

## 2024-12-06 NOTE — PROGRESS NOTES
"    Occupational Therapy  Occupational Therapy Treatment    Patient Name: Khurram Nicole  MRN: [id number]  Today's Date: 12/6/2024  Time Calculation  Start Time: 1240  Stop Time: 1335  Time Calculation (min): 55 min      Time:  Time Calculation  Start Time: 1240  Stop Time: 1335  Time Calculation (min): 55 min  OT Therapeutic Procedures Time Entry  Manual Therapy Time Entry: 8  Therapeutic Exercise Time Entry: 47    Insurance:  Visit number: 4 of 16  Authorization info: 16 authorized visits through 12/31/24  Insurance Type: Payor: [employer] / Plan: [employer]ATION / Product Type: *No Product type* /    General:  Reason for visit: L RF ray resection   Referred by: Jamin    Current Problem  1. Complex regional pain syndrome type 2 of left upper extremity  Follow Up In Occupational Therapy      2. Open displaced fracture of proximal phalanx of left ring finger, initial encounter  Follow Up In Occupational Therapy          Precautions   none      Subjective:   Patient reports \"My hand doesn't like the cold much.\"    Performing HEP?: Yes    Pain  Pain Assessment: 0-10  0-10 (Numeric) Pain Score: 8  Pain Type: Chronic pain  Pain Location: Hand  Pain Orientation: Left  Post-tx pain=6/10    Objective:      strength #2 R/L=94/49#=52%    Physical Observation: intact scar with no drainage   Edema: mild edema is noted in dorsal hand.     Sensory: slight tenderness to touch is noted at SX area.    Treatments:     Modalities:        Modalities  Modalities Used: Yes  Modality 1: Untimed Fluidotherapy with AROM promotion     Therapeutic Exercise:   47 min  Therapeutic Exercise  Therapeutic Exercise Performed: Yes  Therapeutic Exercise Activity 1: tendon glides x 15 reps  Therapeutic Exercise Activity 2: digit add/abd x 15 reps  Therapeutic Exercise Activity 3: wrist E/F and UD/RD x 15 reps each  Therapeutic Exercise Activity 4: wrist E/F x 10 reps each with FA sup/pron  Therapeutic Exercise Activity 5: " Digiflex x 10 reps each with yellow-black  Therapeutic Exercise Activity 6: wrist E/F with FA sup/pron x 10 reps each with a 1# weight x 20 reps    Manual Therapy:     8 min  Manual Therapy  Manual Therapy Performed: Yes  Manual Therapy Activity 1: scar massage and soft tissue mobilization to entire SX area.    Assessment: Patient tolerated light strengthening well.        Plan: Continue with plan of care.       Viola Villasenor, OT

## 2024-12-09 ENCOUNTER — TREATMENT (OUTPATIENT)
Dept: OCCUPATIONAL THERAPY | Facility: HOSPITAL | Age: 74
End: 2024-12-09
Payer: COMMERCIAL

## 2024-12-09 DIAGNOSIS — S62.615B OPEN DISPLACED FRACTURE OF PROXIMAL PHALANX OF LEFT RING FINGER, INITIAL ENCOUNTER: ICD-10-CM

## 2024-12-09 DIAGNOSIS — G56.42 COMPLEX REGIONAL PAIN SYNDROME TYPE 2 OF LEFT UPPER EXTREMITY: ICD-10-CM

## 2024-12-09 PROCEDURE — 97110 THERAPEUTIC EXERCISES: CPT | Mod: GO | Performed by: OCCUPATIONAL THERAPIST

## 2024-12-09 ASSESSMENT — PAIN - FUNCTIONAL ASSESSMENT: PAIN_FUNCTIONAL_ASSESSMENT: 0-10

## 2024-12-09 ASSESSMENT — PAIN SCALES - GENERAL: PAINLEVEL_OUTOF10: 8

## 2024-12-09 NOTE — PROGRESS NOTES
"    Occupational Therapy  Occupational Therapy Treatment    Patient Name: Khurram Nicole  MRN: [id number]  Today's Date: 12/9/2024  Time Calculation  Start Time: 1240  Stop Time: 1330  Time Calculation (min): 50 min      Time:  Time Calculation  Start Time: 1240  Stop Time: 1330  Time Calculation (min): 50 min  OT Therapeutic Procedures Time Entry  Manual Therapy Time Entry: 8  Therapeutic Exercise Time Entry: 42    Insurance:  Visit number: 5 of 16  Authorization info: 16 authorized visits through 12/31/24  Insurance Type: Payor: [employer] / Plan: [employer]ATION / Product Type: *No Product type* /    General:  Reason for visit: L RF ray resection   Referred by: Jamin    Current Problem  1. Complex regional pain syndrome type 2 of left upper extremity  Follow Up In Occupational Therapy      2. Open displaced fracture of proximal phalanx of left ring finger, initial encounter  Follow Up In Occupational Therapy          Precautions   none      Subjective:   Patient reports \"It hurts along the scar.\"    Performing HEP?: Yes    Pain  Pain Assessment: 0-10  0-10 (Numeric) Pain Score: 8  Pain Type: Chronic pain  Pain Location: Hand  Pain Orientation: Left  Post-tx pain=7/10    Objective:     AROM:L IF PIP=-20/97, LF PIP=-30/100, and SF PIP=0/90    Physical Observation: intact incision with no openings   Edema: none significant     Sensory: tenderness to touch is most significant at palmar region.     Treatments:     Modalities:        Modalities  Modalities Used: Yes  Modality 1: Untimed Fluidotherapy with AROM and sensory retraining promotion     Therapeutic Exercise:   42 min  Therapeutic Exercise  Therapeutic Exercise Performed: Yes  Therapeutic Exercise Activity 1: tendon glides x 10 reps  Therapeutic Exercise Activity 2: digit add/abd x 10 reps each with focus on digit adduction.  Therapeutic Exercise Activity 3: wrist AROM x 15 reps each with and without a 1# weight.  Therapeutic Exercise " Activity 4: soft theraputty x 10 reps each with full , ext roll, opposition, ext ring, and key pinch (soft theraputty and 5 exercises were issued for HEP)    Manual Therapy:     8 min  Manual Therapy  Manual Therapy Performed: Yes  Manual Therapy Activity 1: scar massage and soft tissue mobilization to volar and dorsal surfaces o L hand      Assessment: improvement is noted with AROM at all PIP joints.        Plan: Continue with plan of care 1-2x/wk.        Viola Villasenor, OT

## 2024-12-13 ENCOUNTER — TELEPHONE (OUTPATIENT)
Dept: ORTHOPEDIC SURGERY | Facility: HOSPITAL | Age: 74
End: 2024-12-13
Payer: COMMERCIAL

## 2024-12-13 ENCOUNTER — TREATMENT (OUTPATIENT)
Dept: OCCUPATIONAL THERAPY | Facility: HOSPITAL | Age: 74
End: 2024-12-13
Payer: COMMERCIAL

## 2024-12-13 DIAGNOSIS — S62.615B OPEN DISPLACED FRACTURE OF PROXIMAL PHALANX OF LEFT RING FINGER, INITIAL ENCOUNTER: ICD-10-CM

## 2024-12-13 DIAGNOSIS — G56.42 COMPLEX REGIONAL PAIN SYNDROME TYPE 2 OF LEFT UPPER EXTREMITY: ICD-10-CM

## 2024-12-13 PROCEDURE — 97140 MANUAL THERAPY 1/> REGIONS: CPT | Mod: GO | Performed by: OCCUPATIONAL THERAPIST

## 2024-12-13 PROCEDURE — 97110 THERAPEUTIC EXERCISES: CPT | Mod: GO | Performed by: OCCUPATIONAL THERAPIST

## 2024-12-13 ASSESSMENT — PAIN SCALES - GENERAL: PAINLEVEL_OUTOF10: 8

## 2024-12-13 ASSESSMENT — PAIN - FUNCTIONAL ASSESSMENT: PAIN_FUNCTIONAL_ASSESSMENT: 0-10

## 2024-12-13 NOTE — TELEPHONE ENCOUNTER
Wife of patient called. Patient is still having pains, 8 out of 10, four weeks after the surgery. Is this normal? She is also asking if you can prescribe the patient a gabapentin, lidocaine patch or tramadol. Please give LULY a call [phone]. Thank you.

## 2024-12-13 NOTE — PROGRESS NOTES
"    Occupational Therapy  Occupational Therapy Treatment    Patient Name: Khurram Nicole  MRN: [id number]  Today's Date: 12/13/2024  Time Calculation  Start Time: 1230  Stop Time: 1325  Time Calculation (min): 55 min      Time:  Time Calculation  Start Time: 1230  Stop Time: 1325  Time Calculation (min): 55 min  OT Therapeutic Procedures Time Entry  Manual Therapy Time Entry: 8  Therapeutic Exercise Time Entry: 47    Insurance:  Visit number: 6 of 16  Authorization info: 16 authorized visits through 12/31/24  Insurance Type: Payor: [employer] / Plan: [employer]ATION / Product Type: *No Product type* /    General:  Reason for visit: L RF ray resection   Referred by: Jamin    Current Problem  1. Complex regional pain syndrome type 2 of left upper extremity  Follow Up In Occupational Therapy      2. Open displaced fracture of proximal phalanx of left ring finger, initial encounter  Follow Up In Occupational Therapy          Precautions   none      Subjective:   Patient reports \"I still have a lot of pain on the incision.\"    Performing HEP?: Yes    Pain  Pain Assessment: 0-10  0-10 (Numeric) Pain Score: 8  Pain Type: Chronic pain  Pain Location: Hand  Pain Orientation: Left  Post-tx pain=6/10    Objective:     AROM: L IF MP=0/65, LF MP=-10/70, and SF=0/82    Physical Observation: intact incision with no open areas  Edema: none significant       Treatments:     Modalities:       Modalities  Modalities Used: Yes  Modality 1: Untimed Fluidotherapy with AROM and sensory retraining promotion     Therapeutic Exercise:   47 min  Therapeutic Exercise  Therapeutic Exercise Performed: Yes  Therapeutic Exercise Activity 1: tendon glides x 15 reps  Therapeutic Exercise Activity 2: Place and hold flexion and extension of all digits of L hand x 10 reps each  Therapeutic Exercise Activity 3: wrist AROM x 15 reps each  Therapeutic Exercise Activity 4: soft theraputty x 10 reps each with full , ext roll, " opposition, ext ring, and key pinch    Manual Therapy:     8 min  Manual Therapy  Manual Therapy Performed: Yes  Manual Therapy Activity 1: scar massage and soft tissue mobilization      Assessment: Improvement is noted with functional AROM of all digits of L hand.        Plan: Continue with plan of care 1-2x/wk       Viola Villasenor OT

## 2024-12-16 ENCOUNTER — TREATMENT (OUTPATIENT)
Dept: OCCUPATIONAL THERAPY | Facility: HOSPITAL | Age: 74
End: 2024-12-16
Payer: COMMERCIAL

## 2024-12-16 DIAGNOSIS — S62.615B OPEN DISPLACED FRACTURE OF PROXIMAL PHALANX OF LEFT RING FINGER, INITIAL ENCOUNTER: Primary | ICD-10-CM

## 2024-12-16 DIAGNOSIS — G56.42 COMPLEX REGIONAL PAIN SYNDROME TYPE 2 OF LEFT UPPER EXTREMITY: ICD-10-CM

## 2024-12-16 DIAGNOSIS — S62.615B OPEN DISPLACED FRACTURE OF PROXIMAL PHALANX OF LEFT RING FINGER, INITIAL ENCOUNTER: ICD-10-CM

## 2024-12-16 PROCEDURE — 97110 THERAPEUTIC EXERCISES: CPT | Mod: GO | Performed by: OCCUPATIONAL THERAPIST

## 2024-12-16 PROCEDURE — 97140 MANUAL THERAPY 1/> REGIONS: CPT | Mod: GO | Performed by: OCCUPATIONAL THERAPIST

## 2024-12-16 ASSESSMENT — PAIN - FUNCTIONAL ASSESSMENT: PAIN_FUNCTIONAL_ASSESSMENT: 0-10

## 2024-12-16 ASSESSMENT — PAIN SCALES - GENERAL: PAINLEVEL_OUTOF10: 8

## 2024-12-16 NOTE — PROGRESS NOTES
Patient still having a significant amount of pain status post ray amputation.  Clearly still having chronic pain in the area, he has seen pain management in the past and we did discuss a pain management consult in the future but he would like to try topical lidocaine patches.  We will place a C9 for 5% lidocaine patches to use as directed over the area at night to sleep.

## 2024-12-16 NOTE — PROGRESS NOTES
"    Occupational Therapy  Occupational Therapy Treatment    Patient Name: Khurram Nicole  MRN: [id number]  Today's Date: 12/16/2024  Time Calculation  Start Time: 1240  Stop Time: 1335  Time Calculation (min): 55 min        Time:  Time Calculation  Start Time: 1240  Stop Time: 1335  Time Calculation (min): 55 min  OT Therapeutic Procedures Time Entry  Manual Therapy Time Entry: 8  Therapeutic Exercise Time Entry: 47    Insurance:  Visit number: 7 of 16  Authorization info: 16 authorized visits through 12/31/24  Insurance Type: Payor: [employer] / Plan: [employer]ATION / Product Type: *No Product type* /    General:  Reason for visit: L RF ray resection   Referred by: Jamin    Current Problem  1. Complex regional pain syndrome type 2 of left upper extremity  Follow Up In Occupational Therapy      2. Open displaced fracture of proximal phalanx of left ring finger, initial encounter  Follow Up In Occupational Therapy          Precautions   none      Subjective:   Patient reports \"It hurts all the time but the heat helps.\"    Performing HEP?: Yes    Pain  Pain Assessment: 0-10  0-10 (Numeric) Pain Score: 8  Pain Type: Chronic pain  Pain Location: Hand  Pain Orientation: Left  Post-tx pain=6/10    Objective:     AROM: no lags to DPC of L IF, LF, and SF     Physical Observation: full closure with no signs of wound infection   Edema: minor throughout SX site     Sensory: intact     Treatments:     Modalities:        Modalities  Modalities Used: Yes  Modality 1: Untimed Fluidotherapy with AROM and sensory retraining    Therapeutic Exercise:   47 min  Therapeutic Exercise  Therapeutic Exercise Performed: Yes  Therapeutic Exercise Activity 1: place and hold flexion and extension of all digits  Therapeutic Exercise Activity 2: BTE program set-up and completion with 5 exercises    Manual Therapy:     8 min  Manual Therapy  Manual Therapy Performed: Yes  Manual Therapy Activity 1: scar massage and soft tissue " mobilization    Assessment: Good tolerance with BTE program set-up and completion.        Plan: Continue with plan of care 1-2x/wk       Viola Villasenor OT

## 2024-12-20 ENCOUNTER — DOCUMENTATION (OUTPATIENT)
Dept: OCCUPATIONAL THERAPY | Facility: HOSPITAL | Age: 74
End: 2024-12-20
Payer: COMMERCIAL

## 2024-12-20 ENCOUNTER — APPOINTMENT (OUTPATIENT)
Dept: OCCUPATIONAL THERAPY | Facility: HOSPITAL | Age: 74
End: 2024-12-20
Payer: COMMERCIAL

## 2024-12-20 DIAGNOSIS — G56.42 COMPLEX REGIONAL PAIN SYNDROME TYPE 2 OF LEFT UPPER EXTREMITY: ICD-10-CM

## 2024-12-23 ENCOUNTER — TREATMENT (OUTPATIENT)
Dept: OCCUPATIONAL THERAPY | Facility: HOSPITAL | Age: 74
End: 2024-12-23
Payer: COMMERCIAL

## 2024-12-23 DIAGNOSIS — S62.615B OPEN DISPLACED FRACTURE OF PROXIMAL PHALANX OF LEFT RING FINGER, INITIAL ENCOUNTER: ICD-10-CM

## 2024-12-23 DIAGNOSIS — G56.42 COMPLEX REGIONAL PAIN SYNDROME TYPE 2 OF LEFT UPPER EXTREMITY: ICD-10-CM

## 2024-12-23 PROCEDURE — 97110 THERAPEUTIC EXERCISES: CPT | Mod: GO | Performed by: OCCUPATIONAL THERAPIST

## 2024-12-23 NOTE — PROGRESS NOTES
"    Occupational Therapy  Occupational Therapy Treatment    Patient Name: Khurram Nicole  MRN: [id number]  Today's Date: 12/23/2024       Insurance:  Visit number: 8 of 16  Authorization info: 16 authorized visits through 12/31/24  Insurance Type: Payor: [employer] / Plan: [employer] MANAGED C[employer] ORGANIZATION / Product Type: *No Product type* /    General:  Reason for visit: L RF ray resection   Referred by: Jamin  Time:  Time Calculation  Start Time: 1235  Stop Time: 1320  Time Calculation (min): 45 min  OT Therapeutic Procedures Time Entry  Manual Therapy Time Entry: 5  Therapeutic Exercise Time Entry: 40        Current Problem  1. Complex regional pain syndrome type 2 of left upper extremity  Follow Up In Occupational Therapy      2. Open displaced fracture of proximal phalanx of left ring finger, initial encounter  Follow Up In Occupational Therapy          Precautions   none      Subjective:   Patient reports \"I am still having constant pain in my hand . I have been using it more so that might be why.\"    Performing HEP?: Yes    Pain     Post-tx pain=7/10    Objective:     AROM: no lags to DPC of L IF, LF, and SF     Physical Observation: full closure with no signs of wound infection   Edema: minor throughout SX site     Sensory: intact     Treatments:        Therapeutic Exercise:                         40 min  Therapeutic Exercise  Therapeutic Exercise Performed: Yes  Therapeutic Exercise Activity 1: digit range of motion in dry warm medium to increase tissue extensibility and improve range of motion.   Therapeutic Exercise Activity 2: place and hold flexion and extension of all digits  .Therapeutic Exercise Activity 3: active abduction and adduction on tabletop   Therapeutic Exercise Activity 4:  BTE program set-up and completion with 5 exercises  Therapeutic Exercise Activity 5: AAROM digit extension, on table and with green power web  Therapeutic Exercise Activity 6:  putty tools  Therapeutic Exercise " Activity 7: grasping with power web  Therapeutic Exercise Activity 8: wrist and forearm strengthening with green flexbar     Manual Therapy:                                           5 min  Manual Therapy  Manual Therapy Performed: Yes  Manual Therapy Activity 1: scar massage, soft tissue mobilization, and digit flexion stretches    Assessment:      Progressed resistance on BTE. Patient had good activity tolerance to exercises today. Patient to follow up Thursday. Patient continues to report significant pain in hand.   Plan: Continue with plan of care 1-2x/wk       Azeem Taveras OT

## 2024-12-26 ENCOUNTER — OFFICE VISIT (OUTPATIENT)
Dept: ORTHOPEDIC SURGERY | Facility: HOSPITAL | Age: 74
End: 2024-12-26
Payer: COMMERCIAL

## 2024-12-26 ENCOUNTER — TREATMENT (OUTPATIENT)
Dept: OCCUPATIONAL THERAPY | Facility: HOSPITAL | Age: 74
End: 2024-12-26
Payer: COMMERCIAL

## 2024-12-26 DIAGNOSIS — G56.42 COMPLEX REGIONAL PAIN SYNDROME TYPE 2 OF LEFT UPPER EXTREMITY: ICD-10-CM

## 2024-12-26 DIAGNOSIS — S62.615B OPEN DISPLACED FRACTURE OF PROXIMAL PHALANX OF LEFT RING FINGER, INITIAL ENCOUNTER: Primary | ICD-10-CM

## 2024-12-26 DIAGNOSIS — S62.615B OPEN DISPLACED FRACTURE OF PROXIMAL PHALANX OF LEFT RING FINGER, INITIAL ENCOUNTER: ICD-10-CM

## 2024-12-26 PROCEDURE — 99211 OFF/OP EST MAY X REQ PHY/QHP: CPT | Performed by: ORTHOPAEDIC SURGERY

## 2024-12-26 PROCEDURE — 97140 MANUAL THERAPY 1/> REGIONS: CPT | Mod: GO | Performed by: OCCUPATIONAL THERAPIST

## 2024-12-26 PROCEDURE — 97110 THERAPEUTIC EXERCISES: CPT | Mod: GO | Performed by: OCCUPATIONAL THERAPIST

## 2024-12-26 ASSESSMENT — PAIN - FUNCTIONAL ASSESSMENT
PAIN_FUNCTIONAL_ASSESSMENT: 0-10
PAIN_FUNCTIONAL_ASSESSMENT: 0-10

## 2024-12-26 ASSESSMENT — PAIN SCALES - GENERAL
PAINLEVEL_OUTOF10: 8
PAINLEVEL_OUTOF10: 8

## 2024-12-26 NOTE — PROGRESS NOTES
Reason for Appointment  Chief Complaint   Patient presents with    Left Ring Finger - Post-op, Follow-up     History of Present Illness  Patient is here today 6 weeks s/p a left ring finger ray amputation.  The wound looks excellent, fully healed with no severe hypersensitivity. He is still having significant amount of pain in the hand, he clearly has a chronic pain syndrome in the hand from a chronic contracture digit and even after the digit has been removed, he is still dealing with some phantom pain and global pain in the hand.  He has continued to work in hand therapy and he has regained fairly good functional motion in the other digits, we will continue occupational therapy 2 times a week for the next 8 weeks to continue to work on desensitization and scar treatment.  With his amount of chronic pain we will place a C9 for pain management consult with Dr. Babb for any treatment options and modalities to help with his chronic pain and Parkinson's    Assessment   Left ring finger fracture

## 2024-12-26 NOTE — PROGRESS NOTES
"    Occupational Therapy  Occupational Therapy Treatment    Patient Name: Khurram Nicole  MRN: [id number]  Today's Date: 12/26/2024  Time Calculation  Start Time: 1240  Stop Time: 1335  Time Calculation (min): 55 min      Time:  Time Calculation  Start Time: 1240  Stop Time: 1335  Time Calculation (min): 55 min  OT Therapeutic Procedures Time Entry  Manual Therapy Time Entry: 8  Therapeutic Exercise Time Entry: 47    Insurance:  Visit number: 9 of 16  Authorization info: C9 through 12/31/24  Insurance Type: Payor: [employer] / Plan: [employer] MANAGED CARE ORGANIZATION / Product Type: *No Product type* /    General:  Reason for visit: L RF ray resection  Referred by: Jamin    Current Problem  1. Complex regional pain syndrome type 2 of left upper extremity  Follow Up In Occupational Therapy      2. Open displaced fracture of proximal phalanx of left ring finger, initial encounter  Follow Up In Occupational Therapy          Precautions   none      Subjective:   Patient reports \"Not much has changed.\"    Performing HEP?: Yes    Pain  Pain Assessment: 0-10  0-10 (Numeric) Pain Score: 8  Pain Type: Chronic pain  Pain Location: Hand  Pain Orientation: Left  Post-tx pain=7/10    Objective:      strength #2 R/L=90/53#  Physical Observation: intact   Edema: no significant noted     Sensory: hypersensitivity is noted along palmar incision   Numbness/Tingling: dulled sensation is noted along dorsal incision    Treatments:     Modalities:        Modalities  Modalities Used: Yes  Modality 1: Untimed Fluidotherapy with AROM and sensory retraining     Therapeutic Exercise:   47 min  Therapeutic Exercise  Therapeutic Exercise Performed: Yes  Therapeutic Exercise Activity 1: place and hold flexion and extension x 10 reps each  Therapeutic Exercise Activity 2: BTE program completion with all attachments.  Therapeutic Exercise Activity 3: Digiflex x 10 reps each with all x 10 reps    Manual Therapy:     8 min  Manual " Therapy  Manual Therapy Performed: Yes  Manual Therapy Activity 1: scar massage and soft tissue mobilization to dorsal and volar hand    Assessment: Improved functional  strength is noted this p.m.        Plan: Continue with plan of care 1-2x/wk.       Viola Villasenor OT

## 2024-12-30 ENCOUNTER — TREATMENT (OUTPATIENT)
Dept: OCCUPATIONAL THERAPY | Facility: HOSPITAL | Age: 74
End: 2024-12-30
Payer: COMMERCIAL

## 2024-12-30 DIAGNOSIS — G56.42 COMPLEX REGIONAL PAIN SYNDROME TYPE 2 OF LEFT UPPER EXTREMITY: ICD-10-CM

## 2024-12-30 DIAGNOSIS — S62.615B OPEN DISPLACED FRACTURE OF PROXIMAL PHALANX OF LEFT RING FINGER, INITIAL ENCOUNTER: ICD-10-CM

## 2024-12-30 PROCEDURE — 97110 THERAPEUTIC EXERCISES: CPT | Mod: GO | Performed by: OCCUPATIONAL THERAPIST

## 2024-12-30 PROCEDURE — 97140 MANUAL THERAPY 1/> REGIONS: CPT | Mod: GO | Performed by: OCCUPATIONAL THERAPIST

## 2024-12-30 ASSESSMENT — PAIN - FUNCTIONAL ASSESSMENT: PAIN_FUNCTIONAL_ASSESSMENT: 0-10

## 2024-12-30 ASSESSMENT — PAIN SCALES - GENERAL: PAINLEVEL_OUTOF10: 7

## 2024-12-30 NOTE — PROGRESS NOTES
"    Occupational Therapy  Occupational Therapy Orthopedic Progress Note    Patient Name: Khurram Nicole  MRN: [id number]  Today's Date: 12/30/2024  Time Calculation  Start Time: 1235  Stop Time: 1330  Time Calculation (min): 55 min      Time:  Time Calculation  Start Time: 1235  Stop Time: 1330  Time Calculation (min): 55 min  OT Therapeutic Procedures Time Entry  Manual Therapy Time Entry: 8  Therapeutic Exercise Time Entry: 47    Insurance:  Visit number: 10 of 16  Authorization info: C9 expires 12/31/24. Extension request was put in.   Insurance Type: Payor: [employer] / Plan: [employer]IZATION / Product Type: *No Product type* /      General:  Reason for visit: L RF Ray Resection   Referred by: Jamin    Current Problem  1. Complex regional pain syndrome type 2 of left upper extremity  Follow Up In Occupational Therapy      2. Open displaced fracture of proximal phalanx of left ring finger, initial encounter  Follow Up In Occupational Therapy          Precautions: none         Subjective:  Patient reports \"I still have quite a bit of pain.\"    Current Condition:  Better    Pain:  Pain Assessment: 0-10  0-10 (Numeric) Pain Score: 7  Pain Type: Chronic pain  Pain Location: Hand  Pain Orientation: Left  Post-tx pain=6/10  Aggravating Factors: resistive gripping   Relieving Factors:  Rest and Heat     Self Reported Function (0-100%) = 70  - 100% being back to PLOF    Objective:  AROM:L IF MP=0/75, PIP=-12/108, DIP=0/60, KPY=971   L LF MP=0/75, PIP=-28/110, DIP=0/165, PCC=637   L SF MP=0/82, PIP=0/105, DIP=0/85, URP=766     strength #2 R/L=79/47#=59%    Outcome Measures: Updated 12/30/2024  UEFI: 48/80    EDUCATION: home exercise program, plan of care, activity modifications, pain management, and injury pathology       Goals: Updated 12/30/2024  Active       OT Goals       Patient is independent with entire HEP.  (Met)       Start:  11/22/24    Expected End:  12/22/24    Resolved:  12/30/24    "      Patient c/o 2/10 or less pain in L UE with use during ADL's and home exercises.  (Progressing)       Start:  11/22/24    Expected End:  01/29/25            GUIDRY of L IF, LF, and BU=424 or more to assist with functional grasp.  (Met)       Start:  11/22/24    Expected End:  01/29/25    Resolved:  12/30/24         L distal UE AROM is sufficient for independent completion of all ADL's and advanced ADL's.  (Progressing)       Start:  11/22/24    Expected End:  01/21/25            L UE strength is sufficient for independent completion of all ADL's and advanced ADL's.  (Progressing)       Start:  11/22/24    Expected End:  01/21/25              Resolved       OT Goals       Patient is independent with entire HEP.  (Met)       Start:  07/12/24    Expected End:  08/11/24    Resolved:  08/16/24         AROM: GUIDRY of L IF, LF, and NL=123 or more to assist with ADL completion.  (Met)       Start:  07/12/24    Expected End:  08/11/24    Resolved:  08/16/24             Plan of care was developed with input and agreement by the patient    Treatments:     Modalities:        Modalities  Modalities Used: Yes  Modality 1: Untimed Fluidotherapy with AROM and sensory retraining promotion     Therapeutic Exercise:    47 min  Therapeutic Exercise  Therapeutic Exercise Performed: Yes  Therapeutic Exercise Activity 1: place and hold flexion and extension of all digits  Therapeutic Exercise Activity 2: BTE program compleiton with added resistance to all attachements.  Therapeutic Exercise Activity 3: Digiflex x 20 reps with all    Manual Therapy:     8 min  Manual Therapy  Manual Therapy Performed: Yes  Manual Therapy Activity 1: scar massage and soft tissue mobilization    Assessment: Progress is noted in all areas of AROM, strength, and independence with daily function. Reported pain complaints continue to be high. Recommend OT to continue to maximize rehab potential.      Plan:      Planned Interventions include: therapeutic exercise,  therapeutic activity, self-care home management, manual therapy, therapeutic activities, gait training, neuromuscular coordination, vasopneumatic, dry needling, aquatic therapy, electric stimulation, fluidotherapy, ultrasound, kinesiotaping, orthosis fabrication, wound care  Frequency: 1-2 x Week  Duration: 4 Weeks    Viola Villasenor, OT

## 2025-01-03 ENCOUNTER — TREATMENT (OUTPATIENT)
Dept: OCCUPATIONAL THERAPY | Facility: HOSPITAL | Age: 75
End: 2025-01-03
Payer: COMMERCIAL

## 2025-01-03 DIAGNOSIS — S62.615B OPEN DISPLACED FRACTURE OF PROXIMAL PHALANX OF LEFT RING FINGER, INITIAL ENCOUNTER: ICD-10-CM

## 2025-01-03 DIAGNOSIS — G56.42 COMPLEX REGIONAL PAIN SYNDROME TYPE 2 OF LEFT UPPER EXTREMITY: ICD-10-CM

## 2025-01-03 PROCEDURE — 97140 MANUAL THERAPY 1/> REGIONS: CPT | Mod: GO | Performed by: OCCUPATIONAL THERAPIST

## 2025-01-03 PROCEDURE — 97110 THERAPEUTIC EXERCISES: CPT | Mod: GO | Performed by: OCCUPATIONAL THERAPIST

## 2025-01-03 ASSESSMENT — PAIN - FUNCTIONAL ASSESSMENT: PAIN_FUNCTIONAL_ASSESSMENT: 0-10

## 2025-01-03 ASSESSMENT — PAIN SCALES - GENERAL: PAINLEVEL_OUTOF10: 8

## 2025-01-03 NOTE — PROGRESS NOTES
"    Occupational Therapy  Occupational Therapy Treatment    Patient Name: Khurram Nicole  MRN: [id number]  Today's Date: 1/3/2025  Time Calculation  Start Time: 1330  Stop Time: 1425  Time Calculation (min): 55 min    Time:  Time Calculation  Start Time: 1330  Stop Time: 1425  Time Calculation (min): 55 min  OT Therapeutic Procedures Time Entry  Manual Therapy Time Entry: 8  Therapeutic Exercise Time Entry: 47    Insurance:  Visit number: 11 of 19  Authorization info: C9 extended 2-3x/wk through 1/24/25  Insurance Type: Payor: [employer] / Plan: [employer] MANAGED CARE ORGANIZATION / Product Type: *No Product type* /    General:  Reason for visit: L RF ray resection   Referred by: Jamin    Current Problem  1. Complex regional pain syndrome type 2 of left upper extremity  Follow Up In Occupational Therapy      2. Open displaced fracture of proximal phalanx of left ring finger, initial encounter  Follow Up In Occupational Therapy          Precautions   none      Subjective:   Patient reports \"I don't really have any changes.\"    Performing HEP?: Yes    Pain  Pain Assessment: 0-10  0-10 (Numeric) Pain Score: 8  Pain Type: Chronic pain  Pain Location: Hand  Pain Orientation: Left  Post-tx pain=7/10    Objective:     AROM: no lags to DPC's of L IF, LF, and SF following place and hold exercises.     Physical Observation: intact incisions   Edema: none significant     Sensory: no abnormalities are noted around incision.     Treatments:     Modalities:        Modalities  Modalities Used: Yes  Modality 1: Untimed Fluidotherapy with AROM and sensory retraining     Therapeutic Exercise:   47 min  Therapeutic Exercise  Therapeutic Exercise Performed: Yes  Therapeutic Exercise Activity 1: place and hold flexion and extension of all digits x 10 reps each  Therapeutic Exercise Activity 2: BTE program with all attachments  Therapeutic Exercise Activity 3: Digiflex x 20 reps each    Manual Therapy:     8 min  Manual Therapy  Manual " Therapy Performed: Yes  Manual Therapy Activity 1: scar massage and soft tissue massage    Assessment: Improved flexibility of all digits following manual TX.        Plan: Continue with plan of care 1-2x/wk       Viola Villasenor OT

## 2025-01-06 ENCOUNTER — TREATMENT (OUTPATIENT)
Dept: OCCUPATIONAL THERAPY | Facility: HOSPITAL | Age: 75
End: 2025-01-06
Payer: COMMERCIAL

## 2025-01-06 DIAGNOSIS — G56.42 COMPLEX REGIONAL PAIN SYNDROME TYPE 2 OF LEFT UPPER EXTREMITY: ICD-10-CM

## 2025-01-06 DIAGNOSIS — S62.615B OPEN DISPLACED FRACTURE OF PROXIMAL PHALANX OF LEFT RING FINGER, INITIAL ENCOUNTER: ICD-10-CM

## 2025-01-06 PROCEDURE — 97110 THERAPEUTIC EXERCISES: CPT | Mod: GO | Performed by: OCCUPATIONAL THERAPIST

## 2025-01-06 PROCEDURE — 97140 MANUAL THERAPY 1/> REGIONS: CPT | Mod: GO | Performed by: OCCUPATIONAL THERAPIST

## 2025-01-06 ASSESSMENT — PAIN SCALES - GENERAL: PAINLEVEL_OUTOF10: 7

## 2025-01-06 ASSESSMENT — PAIN - FUNCTIONAL ASSESSMENT: PAIN_FUNCTIONAL_ASSESSMENT: 0-10

## 2025-01-06 NOTE — PROGRESS NOTES
"    Occupational Therapy  Occupational Therapy Treatment    Patient Name: Khurram Nicole  MRN: [id number]  Today's Date: 1/6/2025  Time Calculation  Start Time: 1235  Stop Time: 1330  Time Calculation (min): 55 min    Time:  Time Calculation  Start Time: 1235  Stop Time: 1330  Time Calculation (min): 55 min  OT Therapeutic Procedures Time Entry  Manual Therapy Time Entry: 8  Therapeutic Exercise Time Entry: 47    Insurance:  Visit number: 12 of 19  Authorization info: 19 active appointments through 01/24/25  Insurance Type: Payor: [employer] / Plan: [employer] MANAGED CARE ORGANIZATION / Product Type: *No Product type* /     General:  Reason for visit: L RF ray resection   Referred by: Stacie TREADWELL    Current Problem  1. Complex regional pain syndrome type 2 of left upper extremity  Follow Up In Occupational Therapy      2. 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       Treatments:     Modalities:        Modalities  Modalities Used: Yes  Modality 1: Untimed Fluidotherapy with AROM and sensory retraining promotion.     Therapeutic Exercise:   47 min  Therapeutic Exercise  Therapeutic Exercise Performed: Yes  Therapeutic Exercise Activity 1: place and hold digit flexion and extension x 15 reps each  Therapeutic Exercise Activity 2: Digiflex x 20 reps with all yellow-black  Therapeutic Exercise Activity 3: BTE program with added time completion to each attachment    Manual Therapy:     8 min  Manual Therapy  Manual Therapy Performed: " Yes  Manual Therapy Activity 1: scar massage and soft tissue mobilization    Assessment: Progress is noted with ease of AROM completion. Activity endurance continues to be limited.        Plan: Continue with plan of care 1-2x/wk       Viola Villasenor OT

## 2025-01-07 DIAGNOSIS — S62.615B OPEN DISPLACED FRACTURE OF PROXIMAL PHALANX OF LEFT RING FINGER, INITIAL ENCOUNTER: Primary | ICD-10-CM

## 2025-01-07 RX ORDER — LIDOCAINE 50 MG/G
1 PATCH TOPICAL DAILY
Qty: 30 PATCH | Refills: 1 | Status: SHIPPED | OUTPATIENT
Start: 2025-01-07 | End: 2025-02-06

## 2025-01-10 ENCOUNTER — TREATMENT (OUTPATIENT)
Dept: OCCUPATIONAL THERAPY | Facility: HOSPITAL | Age: 75
End: 2025-01-10
Payer: COMMERCIAL

## 2025-01-10 DIAGNOSIS — S62.615B OPEN DISPLACED FRACTURE OF PROXIMAL PHALANX OF LEFT RING FINGER, INITIAL ENCOUNTER: ICD-10-CM

## 2025-01-10 DIAGNOSIS — G56.42 COMPLEX REGIONAL PAIN SYNDROME TYPE 2 OF LEFT UPPER EXTREMITY: ICD-10-CM

## 2025-01-10 PROCEDURE — 97140 MANUAL THERAPY 1/> REGIONS: CPT | Mod: GO | Performed by: OCCUPATIONAL THERAPIST

## 2025-01-10 PROCEDURE — 97110 THERAPEUTIC EXERCISES: CPT | Mod: GO | Performed by: OCCUPATIONAL THERAPIST

## 2025-01-10 ASSESSMENT — PAIN - FUNCTIONAL ASSESSMENT: PAIN_FUNCTIONAL_ASSESSMENT: 0-10

## 2025-01-10 ASSESSMENT — PAIN SCALES - GENERAL: PAINLEVEL_OUTOF10: 7

## 2025-01-10 NOTE — PROGRESS NOTES
"    Occupational Therapy  Occupational Therapy Treatment    Patient Name: Khurram Nicole  MRN: [id number]  Today's Date: 1/10/2025  Time Calculation  Start Time: 1020  Stop Time: 1115  Time Calculation (min): 55 min        Time:  Time Calculation  Start Time: 1020  Stop Time: 1115  Time Calculation (min): 55 min  OT Therapeutic Procedures Time Entry  Manual Therapy Time Entry: 8  Therapeutic Exercise Time Entry: 47    Insurance:  Visit number: 13 of 19  Authorization info: C9 expires 1/24/25  Insurance Type: Payor: [employer] / Plan: [employer] MANAGED CARE ORGANIZATION / Product Type: *No Product type* /    General:  Reason for visit: L RF Ray Resection   Referred by: Jamin    Current Problem  1. Complex regional pain syndrome type 2 of left upper extremity  Follow Up In Occupational Therapy      2. Open displaced fracture of proximal phalanx of left ring finger, initial encounter  Follow Up In Occupational Therapy          Precautions   none      Subjective:   Patient reports \"Everything is about the same.\"    Performing HEP?: Yes    Pain  Pain Assessment: 0-10  0-10 (Numeric) Pain Score: 7  Pain Type: Chronic pain  Pain Location: Hand  Pain Orientation: Left  Post-tx pain=5/10    Objective:     AROM: no lags to DPC of L IF, LF, and SF following PROM.     Physical Observation: intact incision  Edema: no significant     Sensory: hypersensitivity is noted along SX incision region.     Treatments:     Modalities:        Modalities  Modalities Used: Yes  Modality 1: Untimed Fluidotherapy    Therapeutic Exercise:   47 min  Therapeutic Exercise  Therapeutic Exercise Performed: Yes  Therapeutic Exercise Activity 1: place and hold digit flexion and extension x 10  Therapeutic Exercise Activity 2: wrist E/F with FA sup/pron x 20 reps each with and without a 1# weight  Therapeutic Exercise Activity 3: Digiflex x 20 reps with yellow-black  Therapeutic Exercise Activity 4: BTE program with all 5 attachments and longer periods " of completion    Manual Therapy:     8 min  Manual Therapy  Manual Therapy Performed: Yes  Manual Therapy Activity 1: scar massage and soft tissue mobilization to entire palmar and dorsal surface of L hand around SX incision.      Assessment: Improved flexibility of entire distal L UE following all exercises.        Plan: Continue with plan of care 1-2x/wk.        Viola Villasenor OT

## 2025-01-13 ENCOUNTER — TREATMENT (OUTPATIENT)
Dept: OCCUPATIONAL THERAPY | Facility: HOSPITAL | Age: 75
End: 2025-01-13
Payer: COMMERCIAL

## 2025-01-13 DIAGNOSIS — S62.615B OPEN DISPLACED FRACTURE OF PROXIMAL PHALANX OF LEFT RING FINGER, INITIAL ENCOUNTER: ICD-10-CM

## 2025-01-13 DIAGNOSIS — G56.42 COMPLEX REGIONAL PAIN SYNDROME TYPE 2 OF LEFT UPPER EXTREMITY: ICD-10-CM

## 2025-01-13 PROCEDURE — 97110 THERAPEUTIC EXERCISES: CPT | Mod: GO | Performed by: OCCUPATIONAL THERAPIST

## 2025-01-13 PROCEDURE — 97140 MANUAL THERAPY 1/> REGIONS: CPT | Mod: GO | Performed by: OCCUPATIONAL THERAPIST

## 2025-01-13 ASSESSMENT — PAIN - FUNCTIONAL ASSESSMENT: PAIN_FUNCTIONAL_ASSESSMENT: 0-10

## 2025-01-13 ASSESSMENT — PAIN SCALES - GENERAL: PAINLEVEL_OUTOF10: 8

## 2025-01-13 NOTE — PROGRESS NOTES
"    Occupational Therapy  Occupational Therapy Treatment    Patient Name: Khurram Nicole  MRN: [id number]  Today's Date: 1/13/2025  Time Calculation  Start Time: 1235  Stop Time: 1330  Time Calculation (min): 55 min      Time:  Time Calculation  Start Time: 1235  Stop Time: 1330  Time Calculation (min): 55 min  OT Therapeutic Procedures Time Entry  Manual Therapy Time Entry: 8  Therapeutic Exercise Time Entry: 47    Insurance:  Visit number: 14 of 19  Authorization info: 19 authorized visits through 1/24/25  Insurance Type: Payor: [employer] / Plan: "[employer] MANAGED CARE ORGANIZATION / Product Type: *No Product type* /    General:  Reason for visit: s/p L RF ray resection   Referred by: Jamin    Current Problem  1. Complex regional pain syndrome type 2 of left upper extremity  Follow Up In Occupational Therapy      2. Open displaced fracture of proximal phalanx of left ring finger, initial encounter  Follow Up In Occupational Therapy          Precautions   none      Subjective:   Patient reports \"My pain never goes below a 5 or 6.\"    Performing HEP?: Yes    Pain  Pain Assessment: 0-10  0-10 (Numeric) Pain Score: 8  Pain Type: Chronic pain  Pain Location: Hand  Pain Orientation: Left  Post-tx pain=6/10    Objective:     AROM: no lags to DPC's of L IF, LF, or SF     Physical Observation: intact incisions   Edema: no significant     Treatments:     Modalities:        Modalities  Modalities Used: Yes  Modality 1: Untimed Fluidotherapy with AROM promotion    Therapeutic Exercise:   47 min  Therapeutic Exercise  Therapeutic Exercise Performed: Yes  Therapeutic Exercise Activity 1: place and hold digit flexion and extension x 15 reps each  Therapeutic Exercise Activity 2: Wrist E/F with FA sup/pron x 10 reps with 1# and x 10 reps with 2#  Therapeutic Exercise Activity 3: Digiflex x 20 reps each  Therapeutic Exercise Activity 4: BTE program with all attachments with added time for earch    Manual Therapy:     8 min  Manual " Therapy  Manual Therapy Performed: Yes  Manual Therapy Activity 1: scar massage and soft tissue mobilization    Assessment: Improved activity tolerance is noted with entire BTE program completion.        Plan: continue with plan of care 1-2x/wk        Viola Villasenor OT

## 2025-01-17 ENCOUNTER — TREATMENT (OUTPATIENT)
Dept: OCCUPATIONAL THERAPY | Facility: HOSPITAL | Age: 75
End: 2025-01-17
Payer: COMMERCIAL

## 2025-01-17 DIAGNOSIS — S62.615B OPEN DISPLACED FRACTURE OF PROXIMAL PHALANX OF LEFT RING FINGER, INITIAL ENCOUNTER: ICD-10-CM

## 2025-01-17 DIAGNOSIS — G56.42 COMPLEX REGIONAL PAIN SYNDROME TYPE 2 OF LEFT UPPER EXTREMITY: ICD-10-CM

## 2025-01-17 PROCEDURE — 97140 MANUAL THERAPY 1/> REGIONS: CPT | Mod: GO | Performed by: OCCUPATIONAL THERAPIST

## 2025-01-17 PROCEDURE — 97110 THERAPEUTIC EXERCISES: CPT | Mod: GO | Performed by: OCCUPATIONAL THERAPIST

## 2025-01-17 ASSESSMENT — PAIN SCALES - GENERAL: PAINLEVEL_OUTOF10: 8

## 2025-01-17 ASSESSMENT — PAIN - FUNCTIONAL ASSESSMENT: PAIN_FUNCTIONAL_ASSESSMENT: 0-10

## 2025-01-17 NOTE — PROGRESS NOTES
"    Occupational Therapy  Occupational Therapy Treatment    Patient Name: Khurram Nicole  MRN: [id number]  Today's Date: 1/17/2025           Time:       Insurance:  Visit number: 15 of 19  Authorization info: 19 approved visits through 1/24/25  Insurance Type: Payor: [employer] / Plan: [employer] MANAGED CARE ORGANIZATION / Product Type: *No Product type* /    General:  Reason for visit: s/p L RF ray resection   Referred by: Jamin    Current Problem  1. Complex regional pain syndrome type 2 of left upper extremity  Follow Up In Occupational Therapy      2. Open displaced fracture of proximal phalanx of left ring finger, initial encounter  Follow Up In Occupational Therapy          Precautions   none      Subjective:   Patient reports \"I was blowing off the snow yesterday.'    Performing HEP?: Yes    Pain  Pain Assessment: 0-10  0-10 (Numeric) Pain Score: 8  Pain Type: Chronic pain  Pain Location: Hand  Pain Orientation: Left  Post-tx pain=6/10    Objective:     AROM: L wrist E/F=70/62    Physical Observation: intact  Edema: none    Treatments:     Modalities:        Modalities  Modalities Used: Yes  Modality 1: Untimed Fluidotherapy    Therapeutic Exercise:   47 min  Therapeutic Exercise  Therapeutic Exercise Performed: Yes  Therapeutic Exercise Activity 1: place and hold digit flexion and extension x 15 reps each  Therapeutic Exercise Activity 2: Wrist E/F with FA sup/pron x 20 reps each with 2# weight  Therapeutic Exercise Activity 3: BTE program with all 5 attachments.  Therapeutic Exercise Activity 4: All digiflex x 20 reps each    Manual Therapy:     8 min  Manual Therapy  Manual Therapy Performed: Yes  Manual Therapy Activity 1: scar massage and soft tissue mobilization      Assessment: Good tolerance with all exercises this a.m.       Plan: Continue with plan of care 1-2x/wk.       Viola Villasenor OT  "

## 2025-01-20 ENCOUNTER — DOCUMENTATION (OUTPATIENT)
Dept: OCCUPATIONAL THERAPY | Facility: HOSPITAL | Age: 75
End: 2025-01-20
Payer: COMMERCIAL

## 2025-01-20 ENCOUNTER — APPOINTMENT (OUTPATIENT)
Dept: OCCUPATIONAL THERAPY | Facility: HOSPITAL | Age: 75
End: 2025-01-20
Payer: COMMERCIAL

## 2025-01-24 ENCOUNTER — TREATMENT (OUTPATIENT)
Dept: OCCUPATIONAL THERAPY | Facility: HOSPITAL | Age: 75
End: 2025-01-24
Payer: COMMERCIAL

## 2025-01-24 DIAGNOSIS — G56.42 COMPLEX REGIONAL PAIN SYNDROME TYPE 2 OF LEFT UPPER EXTREMITY: ICD-10-CM

## 2025-01-24 DIAGNOSIS — S62.615B OPEN DISPLACED FRACTURE OF PROXIMAL PHALANX OF LEFT RING FINGER, INITIAL ENCOUNTER: ICD-10-CM

## 2025-01-24 PROCEDURE — 97110 THERAPEUTIC EXERCISES: CPT | Mod: GO | Performed by: OCCUPATIONAL THERAPIST

## 2025-01-24 ASSESSMENT — PAIN - FUNCTIONAL ASSESSMENT: PAIN_FUNCTIONAL_ASSESSMENT: 0-10

## 2025-01-24 ASSESSMENT — PAIN SCALES - GENERAL: PAINLEVEL_OUTOF10: 7

## 2025-01-24 NOTE — PROGRESS NOTES
"    Occupational Therapy  Occupational Therapy Treatment    Patient Name: Khurram Nicole  MRN: [id number]  Today's Date: 1/24/2025  Time Calculation  Start Time: 1112  Stop Time: 1200  Time Calculation (min): 48 min      Time:  Time Calculation  Start Time: 1112  Stop Time: 1200  Time Calculation (min): 48 min  OT Therapeutic Procedures Time Entry  Manual Therapy Time Entry: 8  Therapeutic Exercise Time Entry: 40    Insurance:  Visit number: 16 of 19  Authorization info: C9 expires 1/31/25  Insurance Type: Payor: [employer] / Plan: [employer] MANAGED CARE ORGANIZATION / Product Type: *No Product type* /    General:  Reason for visit: L RF ray resection   Referred by: Jamin    Current Problem  1. Complex regional pain syndrome type 2 of left upper extremity  Follow Up In Occupational Therapy      2. Open displaced fracture of proximal phalanx of left ring finger, initial encounter  Follow Up In Occupational Therapy          Precautions   none      Subjective:   Patient reports \"Things are about the same.\"    Performing HEP?: Yes    Pain  Pain Assessment: 0-10  0-10 (Numeric) Pain Score: 7  Pain Type: Chronic pain  Pain Location: Hand  Pain Orientation: Left  Post-tx pain=6/10    Objective:      strength #2 R/L=90/62#    Physical Observation: intact incision   Edema: none significant     Sensory: intact   Numbness/Tingling: none    Treatments:     Modalities:         Modalities  Modalities Used: Yes  Modality 1: Untimed Fluidotherapy with AROM promotion     Therapeutic Exercise:   40 min  Therapeutic Exercise  Therapeutic Exercise Performed: Yes  Therapeutic Exercise Activity 1: wrist and FA AROM x 20 reps with use of 2# weight  Therapeutic Exercise Activity 2: Place and hold digit flexion and extension x 10 reps  Therapeutic Exercise Activity 3: BTE program with all attachments    Manual Therapy:     8 min  Manual Therapy  Manual Therapy Performed: Yes  Manual Therapy Activity 1: scar massage and soft tissue " mobilization    Assessment: Decreased c/o pain following tx.        Plan: Continue with plan of care 1-2x/wk       Viola Villasenor OT

## 2025-01-27 ENCOUNTER — TREATMENT (OUTPATIENT)
Dept: OCCUPATIONAL THERAPY | Facility: HOSPITAL | Age: 75
End: 2025-01-27
Payer: COMMERCIAL

## 2025-01-27 DIAGNOSIS — G56.42 COMPLEX REGIONAL PAIN SYNDROME TYPE 2 OF LEFT UPPER EXTREMITY: ICD-10-CM

## 2025-01-27 DIAGNOSIS — S62.615B OPEN DISPLACED FRACTURE OF PROXIMAL PHALANX OF LEFT RING FINGER, INITIAL ENCOUNTER: ICD-10-CM

## 2025-01-27 PROCEDURE — 97110 THERAPEUTIC EXERCISES: CPT | Mod: GO | Performed by: OCCUPATIONAL THERAPIST

## 2025-01-27 PROCEDURE — 97140 MANUAL THERAPY 1/> REGIONS: CPT | Mod: GO | Performed by: OCCUPATIONAL THERAPIST

## 2025-01-27 ASSESSMENT — PAIN SCALES - GENERAL: PAINLEVEL_OUTOF10: 8

## 2025-01-27 ASSESSMENT — PAIN - FUNCTIONAL ASSESSMENT: PAIN_FUNCTIONAL_ASSESSMENT: 0-10

## 2025-01-27 NOTE — PROGRESS NOTES
"    Occupational Therapy  Occupational Therapy Treatment    Patient Name: Khurram Nicole  MRN: [id number]  Today's Date: 1/27/2025  Time Calculation  Start Time: 1235  Stop Time: 1330  Time Calculation (min): 55 min      Time:  Time Calculation  Start Time: 1235  Stop Time: 1330  Time Calculation (min): 55 min  OT Therapeutic Procedures Time Entry  Manual Therapy Time Entry: 8  Therapeutic Exercise Time Entry: 47    Insurance:  Visit number: 17 of 19  Authorization info: 19 authorized visits through 1/31/25  Insurance Type: Payor: [employer]I / Plan: [employer] MANAGED CARE ORGANIZATION / Product Type: *No Product type* /    General:  Reason for visit: L RF ray resection   Referred by: Jamin     Current Problem  1. Complex regional pain syndrome type 2 of left upper extremity  Follow Up In Occupational Therapy      2. Open displaced fracture of proximal phalanx of left ring finger, initial encounter  Follow Up In Occupational Therapy          Precautions   none      Subjective:   Patient reports \"I see about the same.\"    Performing HEP?: Yes    Pain  Pain Assessment: 0-10  0-10 (Numeric) Pain Score: 8  Pain Type: Chronic pain  Pain Location: Hand  Pain Orientation: Left  Post-tx pain=5/10    Objective:     AROM: L IF MP E/F=-5/72, LF MP E/F=-20/72, and LF SF=-10/75    Physical Observation: intact incision   Edema: mild     Sensory: intact   Numbness/Tingling: none    Treatments:     Modalities:        Modalities  Modalities Used: Yes  Modality 1: Untimed Fluidotherapy with AROM promotion     Therapeutic Exercise:   47 min  Therapeutic Exercise  Therapeutic Exercise Performed: Yes  Therapeutic Exercise Activity 1: wrist and FA AROM x 15 reps each with and without a 1# weight  Therapeutic Exercise Activity 2: place and hold digit flexion and extension  Therapeutic Exercise Activity 3: BTE program completion with all 5 attachments    Manual Therapy:     8 min  Manual Therapy  Manual Therapy Performed: Yes  Manual Therapy " Activity 1: scar massage and soft tissue mobilization    Assessment: Improved functional digit AROM noted this p.m.        Plan: Continue with plan of care 1-2x/wk.       Viola Villasenor, OT

## 2025-01-31 ENCOUNTER — TREATMENT (OUTPATIENT)
Dept: OCCUPATIONAL THERAPY | Facility: HOSPITAL | Age: 75
End: 2025-01-31
Payer: COMMERCIAL

## 2025-01-31 DIAGNOSIS — G56.42 COMPLEX REGIONAL PAIN SYNDROME TYPE 2 OF LEFT UPPER EXTREMITY: ICD-10-CM

## 2025-01-31 DIAGNOSIS — S62.615B OPEN DISPLACED FRACTURE OF PROXIMAL PHALANX OF LEFT RING FINGER, INITIAL ENCOUNTER: ICD-10-CM

## 2025-01-31 PROCEDURE — 97140 MANUAL THERAPY 1/> REGIONS: CPT | Mod: GO | Performed by: OCCUPATIONAL THERAPIST

## 2025-01-31 PROCEDURE — 97110 THERAPEUTIC EXERCISES: CPT | Mod: GO | Performed by: OCCUPATIONAL THERAPIST

## 2025-01-31 ASSESSMENT — PAIN - FUNCTIONAL ASSESSMENT: PAIN_FUNCTIONAL_ASSESSMENT: 0-10

## 2025-01-31 ASSESSMENT — PAIN SCALES - GENERAL: PAINLEVEL_OUTOF10: 7

## 2025-01-31 NOTE — PROGRESS NOTES
"    Occupational Therapy  Occupational Therapy Treatment    Patient Name: Khurram Nicole  MRN: [id number]  Today's Date: 1/31/2025  Time Calculation  Start Time: 1105  Stop Time: 1200  Time Calculation (min): 55 min    Time:  Time Calculation  Start Time: 1105  Stop Time: 1200  Time Calculation (min): 55 min  OT Therapeutic Procedures Time Entry  Manual Therapy Time Entry: 8  Therapeutic Exercise Time Entry: 47    Insurance:  Visit number: 18 of 18  Authorization info: C9 expires today  Insurance Type: Payor: ORI / Plan: [employer] MANAGED CARE ORGANIZATION / Product Type: *No Product type* /    General:  Reason for visit: L RF ray resection   Referred by: Jamin    Current Problem  1. Complex regional pain syndrome type 2 of left upper extremity  Follow Up In Occupational Therapy      2. Open displaced fracture of proximal phalanx of left ring finger, initial encounter  Follow Up In Occupational Therapy          Precautions   none      Subjective:   Patient reports \"My hand is stiff today...probably because of the rain.\"    Performing HEP?: Yes    Pain  Pain Assessment: 0-10  0-10 (Numeric) Pain Score: 7  Pain Type: Chronic pain  Pain Location: Hand  Pain Orientation: Left  Post-tx pain=6/10    Objective:     AROM: L digits #1,2, & 4 have no lags to DPC  Wrist E/F=65/55  Full closure is noted at ray resection site with no abnormalities noted.   strength #2 R/L=95/62#      Physical Observation: intact   Edema: none    Sensory: intact  Numbness/Tingling: none    Treatments:     Modalities:        Modalities  Modalities Used: Yes  Modality 1: Untimed Fluidotherapy with AROM promoted     Therapeutic Exercise:   47 min  Therapeutic Exercise  Therapeutic Exercise Performed: Yes  Therapeutic Exercise Activity 1: wrist and FA AROM x 20 reps with and without a 2# weight  Therapeutic Exercise Activity 2: place and hold digit flexion and extension x 10  Therapeutic Exercise Activity 3: BTE program with all " attachements    Manual Therapy:     8 min  Manual Therapy  Manual Therapy Performed: Yes  Manual Therapy Activity 1: scar massage and soft tissue mobilization with use of Biofreeze    Assessment:  has met his maximum rehab potential. Pain complaints continue to be high with minimal change noted. Khurram is independent with all his home exercises.        Plan: Discharge with follow-up as needed       Viola Villasenor, OT

## 2025-02-03 ENCOUNTER — APPOINTMENT (OUTPATIENT)
Dept: OCCUPATIONAL THERAPY | Facility: HOSPITAL | Age: 75
End: 2025-02-03
Payer: COMMERCIAL

## 2025-02-07 ENCOUNTER — APPOINTMENT (OUTPATIENT)
Dept: OCCUPATIONAL THERAPY | Facility: HOSPITAL | Age: 75
End: 2025-02-07
Payer: COMMERCIAL

## 2025-02-20 ENCOUNTER — OFFICE VISIT (OUTPATIENT)
Dept: ORTHOPEDIC SURGERY | Facility: HOSPITAL | Age: 75
End: 2025-02-20
Payer: COMMERCIAL

## 2025-02-20 DIAGNOSIS — S62.615B OPEN DISPLACED FRACTURE OF PROXIMAL PHALANX OF LEFT RING FINGER, INITIAL ENCOUNTER: Primary | ICD-10-CM

## 2025-02-20 PROCEDURE — 99213 OFFICE O/P EST LOW 20 MIN: CPT | Performed by: ORTHOPAEDIC SURGERY

## 2025-02-20 ASSESSMENT — ENCOUNTER SYMPTOMS
WOUND: 0
SINUS PAIN: 0
WHEEZING: 0
FATIGUE: 0
ARTHRALGIAS: 1
JOINT SWELLING: 0
CHILLS: 0
SHORTNESS OF BREATH: 0
SORE THROAT: 0
FEVER: 0

## 2025-02-20 ASSESSMENT — PAIN SCALES - GENERAL: PAINLEVEL_OUTOF10: 0 - NO PAIN

## 2025-02-20 ASSESSMENT — PAIN - FUNCTIONAL ASSESSMENT: PAIN_FUNCTIONAL_ASSESSMENT: 0-10

## 2025-02-20 NOTE — PROGRESS NOTES
Reason for Appointment  Chief Complaint   Patient presents with    Left Ring Finger - Follow-up, Post-op     History of Present Illness  Patient is a 74 y.o. male here today for a [facility] case follow-up evaluation of his left ring finger.  He is about 3 and half months status post a left ring finger ray amputation for a chronically contracted and painful digit.   He has continued to work in hand therapy which he does feel like is helping him, he has regained good motion in the other digits but his biggest issue is continued significant global pain in the hand.  No other changes in his past medical history, allergies, or medications.    Past Medical History:   Diagnosis Date    Arthritis     Cerebral vascular accident (Multi)     Cervical disc disease     Colon polyp     GERD (gastroesophageal reflux disease)     Hyperlipidemia     Hypertension     Joint pain     Parkinson disease (Multi)     Spinal stenosis     Stroke (Multi)        Past Surgical History:   Procedure Laterality Date    APPENDECTOMY      COLONOSCOPY      HAND SURGERY Left        Medication Documentation Review Audit       Reviewed by Rose Marie Bal MA (Medical Assistant) on 02/20/25 at 1026      Medication Order Taking? Sig Documenting Provider Last Dose Status   amLODIPine (Norvasc) 2.5 mg tablet [id number] Yes Take 1 tablet (2.5 mg) by mouth 2 times a day. Historical Provider, MD  Active   ascorbic acid (Vitamin C) 500 mg tablet [id number] Yes Take 1 tablet (500 mg) by mouth once daily.   Patient taking differently: Take 1 tablet (500 mg) by mouth once daily as needed.    Miquel Winkler MD  Active   aspirin 81 mg chewable tablet [id number] Yes Chew once daily. Historical Provider, MD  Active   ergocalciferol (Vitamin D-2) 1.25 MG (95045 UT) capsule [id number] Yes Take 1 capsule (1,250 mcg) by mouth 1 (one) time per week. Historical Provider, MD  Active   gabapentin (Neurontin) 100 mg capsule [id number] Yes Take 1 capsule (100 mg) by mouth 3 times a day.  Titration schedule handed to patient Miquel Winkler MD  Active   ibuprofen (AdviL) 200 mg tablet [id number] Yes Take 3 tablets (600 mg) by mouth once daily as needed. Historical Provider, MD  Active   pregabalin (Lyrica) 25 mg capsule [id number]  Take 1 capsule (25 mg) by mouth 3 times a day. Discontinue gabapentin, Titration schedule handed to patient   Patient not taking: Reported on 2024    Miquel Winkler MD   24 7099   rosuvastatin (Crestor) 5 mg tablet [id number] Yes Take 1 tablet (5 mg) by mouth once daily at bedtime. Historical Provider, MD  Active   traMADol (Ultram) 50 mg tablet [id number] Yes Take 1 tablet (50 mg) by mouth every 6 hours if needed for severe pain (7 - 10). Historical Provider, MD  Active                    No Known Allergies    Review of Systems   Constitutional:  Negative for chills, fatigue and fever.   HENT:  Negative for nosebleeds, sinus pain and sore throat.    Respiratory:  Negative for shortness of breath and wheezing.    Cardiovascular:  Negative for chest pain and leg swelling.   Musculoskeletal:  Positive for arthralgias. Negative for joint swelling.   Skin:  Negative for rash and wound.     Exam   On exam the left hand shows a well-healed wound with just some mild scar adhesion but no severe hypersensitivity, no color change or temperature change.  He has some global tenderness and pain throughout the hand but good composite flexion of the other digits.  Good pulses and sensation in the upper extremity.    Assessment   Left ring finger proximal phalanx fracture    Plan   We will place a C9 for continued occupational therapy to work on desensitization and scar treatment as the patient does believe this is helping him.  Again, he continues to have global pain throughout the hand after a chronically contracted painful digit and we will place a C9 fora pain management consult with Dr. Babb for other treatment options of his chronic pain.  We will follow-up with  him in 2 months.    By signing below, I, Kevin Quinones MD, personally performed the services described in this documentation. All medical record entries made by the scribe were at my direction and in my presence. I have reviewed the chart and agree that the record reflects my personal performance and is accurate and complete.

## 2025-03-05 ENCOUNTER — APPOINTMENT (OUTPATIENT)
Dept: NEUROLOGY | Facility: CLINIC | Age: 75
End: 2025-03-05
Payer: COMMERCIAL

## 2025-03-06 DIAGNOSIS — S62.615B OPEN DISPLACED FRACTURE OF PROXIMAL PHALANX OF LEFT RING FINGER, INITIAL ENCOUNTER: Primary | ICD-10-CM

## 2025-03-13 ENCOUNTER — EVALUATION (OUTPATIENT)
Dept: OCCUPATIONAL THERAPY | Facility: HOSPITAL | Age: 75
End: 2025-03-13
Payer: COMMERCIAL

## 2025-03-13 DIAGNOSIS — S62.615B OPEN DISPLACED FRACTURE OF PROXIMAL PHALANX OF LEFT RING FINGER, INITIAL ENCOUNTER: Primary | ICD-10-CM

## 2025-03-13 PROCEDURE — 97166 OT EVAL MOD COMPLEX 45 MIN: CPT | Mod: GO | Performed by: OCCUPATIONAL THERAPIST

## 2025-03-13 PROCEDURE — 97110 THERAPEUTIC EXERCISES: CPT | Mod: GO | Performed by: OCCUPATIONAL THERAPIST

## 2025-03-13 ASSESSMENT — ENCOUNTER SYMPTOMS
LOSS OF SENSATION IN FEET: 0
DEPRESSION: 0
OCCASIONAL FEELINGS OF UNSTEADINESS: 0

## 2025-03-13 ASSESSMENT — PAIN - FUNCTIONAL ASSESSMENT: PAIN_FUNCTIONAL_ASSESSMENT: 0-10

## 2025-03-13 ASSESSMENT — PAIN SCALES - GENERAL: PAINLEVEL_OUTOF10: 6

## 2025-03-13 NOTE — PROGRESS NOTES
"    Occupational Therapy  Occupational Therapy Orthopedic Evaluation    Patient Name: Khurram Nicole  MRN: [id number]  Today's Date: 3/13/2025  Time Calculation  Start Time: 0900  Stop Time: 0945  Time Calculation (min): 45 min    Time:  Time Calculation  Start Time: 0900  Stop Time: 0945  Time Calculation (min): 45 min  OT Evaluation Time Entry  OT Evaluation (Moderate) Time Entry: 20  OT Therapeutic Procedures Time Entry  Therapeutic Exercise Time Entry: 25    Insurance:  Visit number: 1 of 12  Authorization info: 1-2x/wk x 8 approved through 4/15/25  Insurance Type: Payor: [employer] / Plan: [employer] MANAGED CARE ORGANIZATION / Product Type: *No Product type* /    General:  Reason for visit: s/p Ray Resection of L RF   Referred by: Jamin    Current Problem  1. Open displaced fracture of proximal phalanx of left ring finger, initial encounter  Referral to Occupational Therapy    Follow Up In Occupational Therapy          Precautions: +Parkinson's        Medical History Form: Reviewed (scanned into chart)    Subjective:   Chief Complaint: \"stiffness...not much has changed.\"  Onset: Patient initially had a crush injury to L hand on 11/6/23 and underwent a L LF and RF extensor tendon repair of both and an ORIF of RF on 11/9/23. Patient continued to have significant pain and stiffness with most recent surgery involving a ray resection of L RF on 11/13/24.   KEYONNA: Chronic   DOI/DOS: DOI=11/6/23, DOS=11/13/24      Hand Dominance: Right    Current Condition since injury:   better      PAIN  Pain Assessment: 0-10  0-10 (Numeric) Pain Score: 6  Pain Type: Chronic pain  Pain Location: Hand  Pain Orientation: Left  Pain Radiating Towards: hand  Aggravating Factors: Gripping/pinching, Weight Bearing, Opening jars/containers, Lifting/Carrying, Finger/Thumb Flexion, and Finger/Thumb extension   Relieving Factors: Rest and Heat     Relevant Information (PMH & Previous Tests/Imaging): +Parkinson's  Previous " "Interventions/Treatments: Physical Therapy/Occupational Therapy O.T. Discharge=1/31/25    Prior Level of Function (PLOF)  Exercise/Physical Activity: Patient reports being independent with all self care prior to injury.   Work/School: Patient is not working at this time.   Current ADL/IADL Status: Minimal challenge is reported with ADL completion with use of L UE.      Patients Living Environment: Reviewed and no concern. Patient lives with his wife.     Primary Language: English    There are no spiritual/cultural practices/values/needs that are important to know      Pt goals for therapy: \"Get moving more.\"    Red Flags: Do you have any of the following? No  Fever/chills, unexplained weight changes, dizziness/fainting, unexplained change in bowel or bladder functions, unexplained malaise or muscle weakness, night pain/sweats, numbness or tingling    Objective:  Evaluation Complexity=Moderate   Rehab Prognosis=Good     LEFT HAND AROM (Degrees)   MCP PIP DIP GUIDRY DPC   IF  0/67 -20/72 0/80 199    MF -20/95 -37/100 0/52 190    RF N/a N/a N/a     SF 0/52 0/52 0/80 184    Thumb WFL  WFL     Thumb RABD WFL       Thumb PABD WFL       Improved AROM is noted with no lags to DPC of L IF, LF, and SF following fluidotherapy use.    WRIST AROM (Degrees)   R L   Extension 55 50   Flexion 70 50   Radial Deviation 20 20   Ulnar Deviation 30 30   Pronation 75 75   Supination 70 70       Hand Strength Measures (lbs)   R L   Dynamometer  99 53               Physical Observation: intact incision. Arthritic changes are noted throughout L hand.  Edema: no si    Sensory: patient c/o numbness along inside borders of LF and SF   Numbness/Tingling: dulled sensation along LF and SF inside borders near ray amputation site.         Outcome Measures:  UEFI: 52/80    EDUCATION: home exercise program, plan of care, activity modifications, pain management, and injury pathology       Goals:  Active       OT Goals       Patient is independent " with entire HEP.        Start:  03/13/25    Expected End:  04/12/25            Patient c/o 2/10 or less pain in L hand with use during ADL's and home exercises.        Start:  03/13/25    Expected End:  04/12/25            No lags to DPC are noted of L IF, LF, and SF to assist with functional grasp.        Start:  03/13/25    Expected End:  04/12/25            L hand AROM is sufficient for independent completion of all ADL's and home management.        Start:  03/13/25    Expected End:  04/27/25            L hand strength is sufficient for independent completion of all ADL's and home management.        Start:  03/13/25    Expected End:  04/27/25                Plan of care was developed with input and agreement by the patient    Treatments:     Modalities:        Modalities  Modalities Used: Yes  Modality 1: Untimed Fluidotherapy with AROM promotion    Therapeutic Exercise:   25 min  Therapeutic Exercise  Therapeutic Exercise Performed: Yes  Therapeutic Exercise Activity 1: tendon gliding x 10 reps (issued for HEP)    Assessment: Patient is a 73 yo male  s/p L RF ray amputation resulting in limited participation in pain-free ADLs and inability to perform at their prior level of function. Pt would benefit from occupational therapy to address the impairments found & listed previously in the objective section in order to return to safe and pain-free ADLs and prior level of function.       Clinical Presentation: Stable and/or uncomplicated characteristics       Plan:      Planned Interventions include: therapeutic exercise, therapeutic activity, self-care home management, manual therapy, therapeutic activities, gait training, neuromuscular coordination, vasopneumatic, dry needling, aquatic therapy, electric stimulation, fluidotherapy, ultrasound, kinesiotaping, orthosis fabrication, wound care  Frequency: 1-2 x Week  Duration: 6 Weeks  Rehab potential/prognosis: Good       Viola Villasenor, OT

## 2025-03-14 PROBLEM — S62.615D: Status: ACTIVE | Noted: 2025-03-14

## 2025-03-26 ENCOUNTER — APPOINTMENT (OUTPATIENT)
Dept: NEUROLOGY | Facility: CLINIC | Age: 75
End: 2025-03-26
Payer: COMMERCIAL

## 2025-03-28 NOTE — PROGRESS NOTES
Subjective     Khurram Nicole is a 74 y.o. year old male who presents with No chief complaint on file., here for follow up visit.    HPI  Last visit 10/29/25 with Dr. Nguyen:  It was a pleasure seeing you in clinic today. At today's visit, we discussed your tremors. Your most likely diagnosis is Parkinson's Disease. After our extensive examination, you do not have findings consistent with essential tremor. You have classic findings of Parkinson's disease in the early phase. We do recommend treatment if this is quality of limiting. Carbidopa and levodopa are the most effective standard medications, but there are some weaker medications without as significant side effects. We did review your stroke from 2023. This was very small. We will document what is needed for medical clearance to be determined by your providers.     Your Instructions:  - Please make an appointment in 4-6 months on your way out today or call in to schedule at your convenience  - Here are your 3 options to consider if you would like treatment for Parkinson's Disease:  1) Carbidopa Levodopa (most effective)  2) Amantadine  3) Rasagiline            MOTOR SYMPTOMS      +/-                            Comments  Motor sx overall     Tremor     Rigidity     Bradykinesia     Balance/gait     FOG     Falls     PT     Exercise       NON MOTOR SYMPTOMS       +/-                               Comments  Orthostatic lightheadedness      Cognitive     Insomnia     RBD     Depression     Anxiety     Fatigue     Sialorrhea     Hypophonia     Dysphagia     Constipation     Urinary dysfunction          Social  Lives with:   Help with ADLs:          [facility] Meds  Med Dose Time                  Latency     Wearing off     Side effects     Dyskinesia     Hallucinations     Impulse control     Sudden sleep     Other       Prior medications:    Pertinent testing:                   Current Outpatient Medications:     amLODIPine (Norvasc) 2.5 mg tablet,  Take 1 tablet (2.5 mg) by mouth 2 times a day., Disp: , Rfl:     ascorbic acid (Vitamin C) 500 mg tablet, Take 1 tablet (500 mg) by mouth once daily. (Patient taking differently: Take 1 tablet (500 mg) by mouth once daily as needed.), Disp: 30 tablet, Rfl: 11    aspirin 81 mg chewable tablet, Chew once daily., Disp: , Rfl:     ergocalciferol (Vitamin D-2) 1.25 MG (28047 UT) capsule, Take 1 capsule (1,250 mcg) by mouth 1 (one) time per week., Disp: , Rfl:     gabapentin (Neurontin) 100 mg capsule, Take 1 capsule (100 mg) by mouth 3 times a day. Titration schedule handed to patient, Disp: 180 capsule, Rfl: 1    ibuprofen (AdviL) 200 mg tablet, Take 3 tablets (600 mg) by mouth once daily as needed., Disp: , Rfl:     pregabalin (Lyrica) 25 mg capsule, Take 1 capsule (25 mg) by mouth 3 times a day. Discontinue gabapentin, Titration schedule handed to patient (Patient not taking: Reported on 11/4/2024), Disp: 90 capsule, Rfl: 1    rosuvastatin (Crestor) 5 mg tablet, Take 1 tablet (5 mg) by mouth once daily at bedtime., Disp: , Rfl:     traMADol (Ultram) 50 mg tablet, Take 1 tablet (50 mg) by mouth every 6 hours if needed for severe pain (7 - 10)., Disp: , Rfl:        Objective   There were no vitals filed for this visit.              Physical Exam    {MDS UPDRS 1st Score (Optional):82124}        Assessment/Plan   Khurram Nicole is a 73 year-old R handed male with HTN, CAD, depression, and peripheral neuropathy presenting for follow-up of tremors, concern for Parkinsonism. Last seen about 1 year ago on 10/4/2023. His examination today off of C/L is most consistent with idopathic, tremor-predominant PD. This is consistent with prior clinical impressions. We discussed treatment options, with a primary recommendation to retry sinemet while also addressing the patient's poor sleep quality (although refuses a sleep study). We also discussed options, albeit less effective, including amantadine or rasagiline. The patient elected  to discuss this over with his spouse and reassess if he wants to be on medication.     In regards to his prior infarct in 2023. This occurred at [facility]. He has a minimal DWI diffusion restriction indicating a very small L IRENE infarct. He has no residual stroke symptoms. He has been appropriately worked up. There are no overt contraindications from a stroke or Parkinson's standpoint for elective surgery. However, overall cardiac and medical clearance should be confirmed by the patient's PCP.     PLAN / RECOMMENDATIONS:  # Idiopathic PD  - Favor starting C/L, but per patient preference will also weigh other options including amantadine or rasagiline  - RTC in 4 months      {Assess/Plan SmartLinks (Optional):11494}            Bobby Birmingham NP-C  Adult/Gerontological Nurse Practitioner   [facility], Department of Neurology  [facility]  [facility]  [id number] [place] [address] [place] [address]  Phone: [phone]  Fax: [phone]   "    Side effects include but are not limited to headache, lightheadedness on standing, dyskinesias.     Rasagiline should NOT be taken concurrently with any of the following medications:     1. Dextromethorphan, or any medication containing it (typically cold and flu medications contain dextromethorphan. If so, they should say \"DM\" on the bottle)  2. Cyclobenzaprine   3. Meperidine   4. Methadone   5. Tetrabenazine, deutetrabenazine (Austedo)  6. Tramadol  7. Linezolid  8. Imipramine  9. Bupropion (Wellbutrin)    Rasagiline should be stopped 10 days before any procedure with anesthesia. Your pharmacy insert may describe food restrictions but you do not have to follow these while on this medication.      #Exercise at least 3-4 times a week. Exercise can help with Parkinson's symptoms and memory. Stationary bike in particular has been shown to be effective in Parkinson's. Include aerobic exercise, strengthening (ie, weights), and stretching but any form you do consistently is helpful.  To find exercise classes in your area call the Parkinson's Foundation Helpline at 4-857-8FR-INFO ([phone])    Fitness Counts: A Body Guide to Parkinson's Disease   - http://www.parkinson.org/sites/default/files/Fitness_Counts.pdf  Neuroprotective Benefits of Exercise  -http://www.parkinson.org/understanding-parkinsons/treatment/Exercise/Neuroprotective-Benefits-of-Exercise  National Parkinson's Foundation: Tips for Becoming Physically Active  http://www.parkinson.org/sites/default/files/Tips%20for%20Becoming%20Physically%20Active.pdf  The Parkinson's Exercise Guide by the American Parkinson Disease Association   - https://www.apdaparkinson.org/download-exercise-guide/      Free Exercise and Parkinson's Resource:  They have ONLINE and INPERSON classes!     [employer] was founded and is managed, in part, by people who were diagnosed with Parkinson's disease or other movement disorders.  https://[employer].org/    How can [employer] " help?  [employer] works to strengthen your mind, body and spirit through education and healing arts, fitness, and support services. Our holistic approach is as unique as it is effective, and we offer it all to you at no cost. Here, you'll become part of a community of people with whom you can relate. Visit our programs page to see what we offer. Our Ask the Doctor series provides continuing education in the treatment of movement disorders and in managing symptoms.  Our welcoming space is located in [place] at [address] [place] [address], off [facility] [place] [facility]. Our 20,000 square-foot building features rooms for exercise, boxing, spinning, healing arts, music and more. It also includes a cafe for socializing before or after class. The entire building is fully accessible and has 110 parking spaces.    All tours as well as orientation sessions for new clients are available by appointment only. Please contact Alexandr Pickard at [phone] or email [email or web address] to schedule.      #Follow up in 5-6M with Dr. Patrick HURLEY, KRISTINE Birmingham, personally performed  a medically appropriate exam, discussion of care/treatment options taking a total time of 23minutes for today's visit.      ROSALBA ArchuletaC  Adult/Gerontological Nurse Practitioner   [facility], Department of Neurology  [facility] [place]  [facility]  [id number] [place] [address] [place] [id number]  Phone: [phone]  Fax: [phone]

## 2025-03-31 ENCOUNTER — APPOINTMENT (OUTPATIENT)
Dept: NEUROLOGY | Facility: CLINIC | Age: 75
End: 2025-03-31
Payer: MEDICARE

## 2025-03-31 VITALS
HEART RATE: 73 BPM | RESPIRATION RATE: 16 BRPM | BODY MASS INDEX: 29.25 KG/M2 | SYSTOLIC BLOOD PRESSURE: 150 MMHG | DIASTOLIC BLOOD PRESSURE: 87 MMHG | WEIGHT: 234 LBS

## 2025-03-31 DIAGNOSIS — G20.A1 PARKINSON'S DISEASE WITHOUT DYSKINESIA OR FLUCTUATING MANIFESTATIONS: Primary | ICD-10-CM

## 2025-03-31 PROCEDURE — 1036F TOBACCO NON-USER: CPT | Performed by: NURSE PRACTITIONER

## 2025-03-31 PROCEDURE — 3075F SYST BP GE 130 - 139MM HG: CPT | Performed by: NURSE PRACTITIONER

## 2025-03-31 PROCEDURE — 99213 OFFICE O/P EST LOW 20 MIN: CPT | Performed by: NURSE PRACTITIONER

## 2025-03-31 PROCEDURE — 3079F DIAST BP 80-89 MM HG: CPT | Performed by: NURSE PRACTITIONER

## 2025-03-31 PROCEDURE — 1160F RVW MEDS BY RX/DR IN RCRD: CPT | Performed by: NURSE PRACTITIONER

## 2025-03-31 PROCEDURE — 1159F MED LIST DOCD IN RCRD: CPT | Performed by: NURSE PRACTITIONER

## 2025-03-31 RX ORDER — DOXYCYCLINE 100 MG/1
100 TABLET ORAL 2 TIMES DAILY
COMMUNITY
Start: 2025-03-28 | End: 2025-04-18

## 2025-03-31 ASSESSMENT — UNIFIED PARKINSONS DISEASE RATING SCALE (UPDRS)
TOETAPPING_LEFT: 1
RIGIDITY_RLE: 1
FINGER_TAPPING_RIGHT: 0
POSTURAL_TREMOR_LEFTHAND: 0
PRONATION_SUPINATION_RIGHT: 2
KINETIC_TREMOR_RIGHTHAND: 0
POSTURAL_TREMOR_RIGHTHAND: 1
AMPLITUDE_RUE: 0
SPONTANEITY_OF_MOVEMENT: 2
POSTURAL_STABILITY: 0
KINETIC_TREMOR_LEFTHAND: 0
AMPLITUDE_LLE: 0
SPEECH: 2
AMPLITUDE_LUE: 2
FINGER_TAPPING_LEFT: 2
RIGIDITY_NECK: 2
PRONATION_SUPINATION_LEFT: 0
PARKINSONS_MEDS: NO
AMPLITUDE_LIP_JAW: 0
RIGIDITY_LUE: 1
TOTAL_SCORE: 34
HANDMOVEMENTS_RIGHT: 2
POSTURE: 0
GAIT: 2
FREEZING_GAIT: 0
FACIAL_EXPRESSION: 2
LEG_AGILITY_RIGHT: 1
RIGIDITY_LLE: 0
CONSTANCY_TREMOR_ATREST: 4
DYSKINESIAS_PRESENT: NO
TOETAPPING_RIGHT: 2
CHAIR_RISING_SCALE: 1
LEG_AGILITY_LEFT: 1
RIGIDITY_RUE: 2
AMPLITUDE_RLE: 0
LEVODOPA: NO

## 2025-03-31 ASSESSMENT — PATIENT HEALTH QUESTIONNAIRE - PHQ9
2. FEELING DOWN, DEPRESSED OR HOPELESS: NOT AT ALL
1. LITTLE INTEREST OR PLEASURE IN DOING THINGS: NOT AT ALL
SUM OF ALL RESPONSES TO PHQ9 QUESTIONS 1 AND 2: 0

## 2025-03-31 ASSESSMENT — ENCOUNTER SYMPTOMS
LOSS OF SENSATION IN FEET: 0
OCCASIONAL FEELINGS OF UNSTEADINESS: 0
DEPRESSION: 0

## 2025-03-31 NOTE — PATIENT INSTRUCTIONS
"#Here are your 3 options to consider if you would like treatment for Parkinson's Disease:   1) Carbidopa Levodopa (most effective)  Side effects include but are not limited to: sleepiness, nausea, constipation, dizziness, hallucinations, or involuntary wiggling movements.     2) Amantadine  Side effects include but are not limited to hallucinations, ankle swelling, dizziness, rare skin rash. If these or any other side effects occur please let me know.     3) Rasagiline  Side effects include but are not limited to headache, lightheadedness on standing, dyskinesias.     Rasagiline should NOT be taken concurrently with any of the following medications:     1. Dextromethorphan, or any medication containing it (typically cold and flu medications contain dextromethorphan. If so, they should say \"DM\" on the bottle)  2. Cyclobenzaprine   3. Meperidine   4. Methadone   5. Tetrabenazine, deutetrabenazine (Austedo)  6. Tramadol  7. Linezolid  8. Imipramine  9. Bupropion (Wellbutrin)    Rasagiline should be stopped 10 days before any procedure with anesthesia. Your pharmacy insert may describe food restrictions but you do not have to follow these while on this medication.      #Exercise at least 3-4 times a week. Exercise can help with Parkinson's symptoms and memory. Stationary bike in particular has been shown to be effective in Parkinson's. Include aerobic exercise, strengthening (ie, weights), and stretching but any form you do consistently is helpful.  To find exercise classes in your area call the Parkinson's Foundation Helpline at 1-324-2SP-INFO ([phone])    Fitness Counts: A Body Guide to Parkinson's Disease   - http://www.parkinson.org/sites/default/files/Fitness_Counts.pdf  Neuroprotective Benefits of Exercise  -http://www.parkinson.org/understanding-parkinsons/treatment/Exercise/Neuroprotective-Benefits-of-Exercise  National Parkinson's Foundation: Tips for Becoming Physically " Active  http://www.parkinson.org/sites/default/files/Tips%20for%20Becoming%20Physically%20Active.pdf  The Parkinson's Exercise Guide by the American Parkinson Disease Association   - https://www.apdaparkinson.org/download-exercise-guide/      Free Exercise and Parkinson's Resource:  They have ONLINE and INPERSON classes!     [employer] was founded and is managed, in part, by people who were diagnosed with Parkinson's disease or other movement disorders.  https://[employer].org/    How can [employer] help?  [employer] works to strengthen your mind, body and spirit through education and healing arts, fitness, and support services. Our holistic approach is as unique as it is effective, and we offer it all to you at no cost. Here, you'll become part of a community of people with whom you can relate. Visit our programs page to see what we offer. Our Ask the Doctor series provides continuing education in the treatment of movement disorders and in managing symptoms.  Our welcoming space is located in [place] at [address] [place] [address], off [facility] at the [address]. Our 20,000 square-foot building features rooms for exercise, boxing, spinning, healing arts, music and more. It also includes a cafe for socializing before or after class. The entire building is fully accessible and has 110 parking spaces.    All tours as well as orientation sessions for new clients are available by appointment only. Please contact Alexandr Pickard at [phone] or email [email or web address] to schedule.      #Follow up in 5-6M with Dr. Patrick Birmingham, NP-C  Adult/Gerontological Nurse Practitioner   [facility], Department of Neurology  [facility] [place]  [facility]  [id number] [place] [address]  Phone: [phone]  Fax: [phone]

## 2025-04-01 ENCOUNTER — OFFICE VISIT (OUTPATIENT)
Dept: PAIN MEDICINE | Facility: HOSPITAL | Age: 75
End: 2025-04-01
Payer: COMMERCIAL

## 2025-04-01 DIAGNOSIS — M79.642 LEFT HAND PAIN: ICD-10-CM

## 2025-04-01 DIAGNOSIS — G56.42 COMPLEX REGIONAL PAIN SYNDROME TYPE 2 OF LEFT UPPER EXTREMITY: Primary | ICD-10-CM

## 2025-04-01 PROCEDURE — 99214 OFFICE O/P EST MOD 30 MIN: CPT | Performed by: ANESTHESIOLOGY

## 2025-04-01 RX ORDER — TOPIRAMATE 25 MG/1
TABLET ORAL
Qty: 70 TABLET | Refills: 0 | Status: SHIPPED | OUTPATIENT
Start: 2025-04-01

## 2025-04-01 ASSESSMENT — PAIN SCALES - GENERAL: PAINLEVEL_OUTOF10: 7

## 2025-04-01 NOTE — PROGRESS NOTES
Subjective   Patient ID: Khurram Nicole is a 74 y.o. male with a past medical history of crush injury of left 4th finger s/p amputation     HPI:   Khurram is a new patient here for evaluation of left hand pain.  Has a history of a crush injury in November 2023.  Surgical correction of the finger was attempted, but eventually amputated in November 2024.  Since then he has had constant pain in the palmar side of the left fourth finger, endorses phantom pain in the left fourth finger, as well as pain in the dorsum of the left hand.  Describes the pain as a combination of dull ache, burning, stabbing.  Pain is constant with no exacerbating or relieving factors.  Previously saw Dr. Winkler who trialed gabapentin and Lyrica, but medication made him sleepy with no pain relief.  At his last office visit with Dr. Winkler, they considered stellate ganglion block and trialed compound creams.  He was asleep many nights due to the pain.  Less active and willing to participate in activities per wife.  Very concerned for side effects of stellate ganglion blocks and medications.    Pain score 8/10  Physical Therapy: The patient has done six or more weeks of physical therapy in the past six months with minimal improvement  Other Conservative Measures he has tried: Chiropractic, Heating Pad, Ice, and Massage/myofascial release  Classes of medications tried in the past: Acetaminophen, NSAIDs, Gabapentenoids, Topical agents, and Opioids      Review of Systems   13-point ROS done and negative except for HPI.     Current Outpatient Medications   Medication Instructions    amLODIPine (NORVASC) 2.5 mg, 2 times daily    aspirin 81 mg chewable tablet Daily RT    doxycycline (ADOXA) 100 mg, 2 times daily    ergocalciferol (Vitamin D-2) 1.25 MG (78509 UT) capsule 1 capsule, Once Weekly    ibuprofen (ADVIL) 600 mg, Daily PRN    rosuvastatin (CRESTOR) 5 mg, Nightly       Past Medical History:   Diagnosis Date    Arthritis     Cerebral vascular  accident (Multi)     Cervical disc disease     Colon polyp     Displaced fracture of proximal phalanx of left ring finger with routine healing 03/14/2025    GERD (gastroesophageal reflux disease)     Hyperlipidemia     Hypertension     Joint pain     Parkinson disease (Multi)     Spinal stenosis     Stroke (Multi)         Past Surgical History:   Procedure Laterality Date    APPENDECTOMY      COLONOSCOPY      HAND SURGERY Left         Family History   Problem Relation Name Age of Onset    Heart disease Mother      Coronary artery disease Father      Colon cancer Father      Kidney failure Father      Transient ischemic attack Sister          No Known Allergies     Objective     There were no vitals filed for this visit.     Physical Exam  General: NAD, well groomed, well nourished  Eyes: Non-icteric sclera, EOMI  Ears, Nose, Mouth, and Throat: External ears and nose appear to be without deformity or rash. No lesions or masses noted. Hearing is grossly intact.   Neck: Trachea midline  Respiratory: Nonlabored breathing   Cardiovascular: no peripheral edema   Skin: No rashes or open lesions/ulcers identified on skin.    Neck/upper extremity:  Spurling negative bilateral  Decreased sensation and hyperalgesia to the palmar side near the left finger amputation, dorsum of the left hand  Swelling of the joints of both hands  Slight erythema of the left hand compared to the right hand  No temperature change or dystrophic hair/nail pattern of the left hand compared to the right    Neurologic:   Cranial nerves grossly intact.   Strength 5/5 in right upper extremity all planes of motion  Strength 4 -/5 in left hand intrinsic muscles  Sensation: Decree sensation light touch in the left hand  DTRs:normal and symmetric throughout  Kaur: absent    Psychiatric: Alert, orientation to person, place, and time. Cooperative.    Imaging personally reviewed and independently interpreted:   Left hand x-ray 2024  FINDINGS:  Left hand,  three views      Postsurgical changes status post fracture fixation of the 4th  proximal phalangeal fracture. The hardware is intact. There is no  acute fracture or dislocation. Soft tissue edema about the 4th digit  and about the hand.      There is moderate joint space narrowing with osteophyte formation in  the 3rd MCP joint and in the 1st CMC and 1st IP joints.      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  He has history commonly found in CRPS with some supportive physical exam findings.  He has failed conservative management with PT/OT and gabapentin/Lyrica/topical medication.  We had a lengthy discussion about the therapeutic options including neuromodulating medications, TENS unit, stellate ganglion injections, and SCS trial.  After answering many questions, they would like to start with medication management.  Will start with Topamax, to consider adding Cymbalta a second agent.  Will also get approval through [facility] for left hand glove TENS unit.  If pain persist, could consider referral to [facility] for ketamine, stellate ganglion, SCS.    Plan:  -Start Topamax titration up to 50 mg twice a day.  Denies history of kidney stones and glaucoma.  Titration schedule given to the patient and explained in the office today.  If we stop the medication later we would need to wean it down slowly rather than stop it abruptly just like he had to do with gabapentin and Lyrica in the past.  -Will ask for approval from [employer] for glove TENS unit for left hand and compounding cream with ketamine.  -Consider Cymbalta, left-sided ganglion block, SCS trial in the future  -Also considered [facility] referral for ketamine fusions in the future.    The patient has failed treatment with : Physical therapy , three or  more classes of medications, alternative therapies, have significant limitations in their sleep quality due to the pain, have significant limitations of their quality of life due to the pain, have significant impairments of their activities of daily living (ADLs) due to the pain, and have significant impairments of their instrumental activities of daily living (IADLs) due to the pain    We discussed  the risks, benefits and alternatives of the procedure including but not limited to: , Neuritis/sunburn sensation, Lack of efficacy , Transiently worsening pain , Bleeding, Infection , and Nerve Damage    Follow up: As needed     The patient was invited to contact us back anytime with any questions or concerns and follow-up with us in the office as needed.     Diagnoses and all orders for this visit:  Complex regional pain syndrome type 2 of left upper extremity      This note was generated with the aid of dictation software, there may be typos despite my attempts at proofreading.    Edy Estrada, DO  Pain Fellow

## 2025-04-02 ENCOUNTER — TELEPHONE (OUTPATIENT)
Dept: NEUROLOGY | Facility: CLINIC | Age: 75
End: 2025-04-02
Payer: COMMERCIAL

## 2025-04-02 DIAGNOSIS — G20.A1 PARKINSON'S DISEASE WITHOUT DYSKINESIA OR FLUCTUATING MANIFESTATIONS: Primary | ICD-10-CM

## 2025-04-02 RX ORDER — AMANTADINE HYDROCHLORIDE 100 MG/1
TABLET ORAL
Qty: 60 TABLET | Refills: 3 | Status: SHIPPED | OUTPATIENT
Start: 2025-04-02

## 2025-04-02 RX ORDER — AMANTADINE HYDROCHLORIDE 100 MG/1
TABLET ORAL
Qty: 60 TABLET | Refills: 3 | Status: SHIPPED | OUTPATIENT
Start: 2025-04-02 | End: 2025-04-02

## 2025-04-02 NOTE — TELEPHONE ENCOUNTER
Patient called in stated he would like to start the Amantadine discussed in last visit  3/31/25- Please send to [facility] on file

## 2025-04-04 ENCOUNTER — TREATMENT (OUTPATIENT)
Dept: OCCUPATIONAL THERAPY | Facility: HOSPITAL | Age: 75
End: 2025-04-04
Payer: COMMERCIAL

## 2025-04-04 DIAGNOSIS — S62.615B OPEN DISPLACED FRACTURE OF PROXIMAL PHALANX OF LEFT RING FINGER, INITIAL ENCOUNTER: ICD-10-CM

## 2025-04-04 PROCEDURE — 97110 THERAPEUTIC EXERCISES: CPT | Mod: GO | Performed by: OCCUPATIONAL THERAPIST

## 2025-04-04 ASSESSMENT — PAIN SCALES - GENERAL: PAINLEVEL_OUTOF10: 6

## 2025-04-04 ASSESSMENT — PAIN - FUNCTIONAL ASSESSMENT: PAIN_FUNCTIONAL_ASSESSMENT: 0-10

## 2025-04-04 NOTE — PROGRESS NOTES
"    Occupational Therapy  Occupational Therapy Treatment    Patient Name: Khurram Nicole  MRN: [id number]  Today's Date: 4/4/2025  Time Calculation  Start Time: 1330  Stop Time: 1425  Time Calculation (min): 55 min        Time:  Time Calculation  Start Time: 1330  Stop Time: 1425  Time Calculation (min): 55 min  OT Therapeutic Procedures Time Entry  Therapeutic Exercise Time Entry: 55    Insurance:  Visit number: 2 of 12  Authorization info: C9 approved through 4/15/25  Insurance Type: Payor: ORI / Plan: [employer] MANAGED CARE ORGANIZATION / Product Type: *No Product type* /    General:  Reason for visit: L RF ray resection   Referred by: Jamin     Current Problem  1. Open displaced fracture of proximal phalanx of left ring finger, initial encounter  Follow Up In Occupational Therapy          Precautions   none      Subjective:   Patient reports \"I am doing about the same.\"    Performing HEP?: Yes    Pain  Pain Assessment: 0-10  0-10 (Numeric) Pain Score: 6  Pain Type: Chronic pain  Pain Location: Hand  Pain Orientation: Left  Post-tx pain=5/10    Objective:      strength #2 R/L=89/54#    Physical Observation: no abnormalities are noted   Edema: no changes are noted     Sensory: dulled sensation is reported along radial border of SF across SX site and along ulnar border of LF.   Numbness/Tingling: dulled sensation is noted.     Treatments:     Modalities:        Modalities  Modalities Used: Yes  Modality 1: Untimed Fluidotherapy with AROM promotion.     Therapeutic Exercise:   55 min  Therapeutic Exercise  Therapeutic Exercise Performed: Yes  Therapeutic Exercise Activity 1: tendon gliding x 15 reps  Therapeutic Exercise Activity 2: digiflex x 20 reps each with yellow-black  Therapeutic Exercise Activity 3: BTE program set-up and completion with 4 attachments.    Assessment: Improvement is noted with  strength this p.m. to assist with ADL completion.        Plan: Continue with plan of care 1-2x/wk   "     Viola Villasenor, OT

## 2025-04-08 ENCOUNTER — TREATMENT (OUTPATIENT)
Dept: OCCUPATIONAL THERAPY | Facility: HOSPITAL | Age: 75
End: 2025-04-08
Payer: COMMERCIAL

## 2025-04-08 DIAGNOSIS — S62.615B OPEN DISPLACED FRACTURE OF PROXIMAL PHALANX OF LEFT RING FINGER, INITIAL ENCOUNTER: ICD-10-CM

## 2025-04-08 PROCEDURE — 97110 THERAPEUTIC EXERCISES: CPT | Mod: GO | Performed by: OCCUPATIONAL THERAPIST

## 2025-04-08 ASSESSMENT — PAIN SCALES - GENERAL: PAINLEVEL_OUTOF10: 6

## 2025-04-08 ASSESSMENT — PAIN - FUNCTIONAL ASSESSMENT: PAIN_FUNCTIONAL_ASSESSMENT: 0-10

## 2025-04-08 NOTE — PROGRESS NOTES
"    Occupational Therapy  Occupational Therapy Treatment    Patient Name: Khurram Nicole  MRN: [id number]  Today's Date: 4/8/2025  Time Calculation  Start Time: 1115  Stop Time: 1208  Time Calculation (min): 53 min        Time:  Time Calculation  Start Time: 1115  Stop Time: 1208  Time Calculation (min): 53 min  OT Therapeutic Procedures Time Entry  Therapeutic Exercise Time Entry: 53    Insurance:  Visit number: 3 of 12  Authorization info: 12 approved visits through 4/15/25  Insurance Type: Payor: ORI / Plan: [employer] MANAGED CARE ORGANIZATION / Product Type: *No Product type* /    General:  Reason for visit: L RF ray resection   Referred by: Jamin    Current Problem  1. Open displaced fracture of proximal phalanx of left ring finger, initial encounter  Follow Up In Occupational Therapy          Precautions   none      Subjective:   Patient reports \"Things are about the same.\"    Performing HEP?: Yes    Pain  Pain Assessment: 0-10  0-10 (Numeric) Pain Score: 6  Pain Type: Chronic pain  Pain Location: Hand  Pain Orientation: Left  Post-tx pain=5/10    Objective:     AROM: no lags to DPC's of all digits following place and hold exercises.     Physical Observation: no abnormalities are noted.   Edema: no significant change.     Sensory: no significant change is noted.    Treatments:     Modalities:        Modalities  Modalities Used: Yes  Modality 1: Untimed Fluidotherapy with AROM and sensory retraining     Therapeutic Exercise:   53 min  Therapeutic Exercise  Therapeutic Exercise Performed: Yes  Therapeutic Exercise Activity 1: tendon gliding x 10 reps  Therapeutic Exercise Activity 2: place and hold flexion and extension x 10 reps  Therapeutic Exercise Activity 3: Digiflex x 20 reps with all  Therapeutic Exercise Activity 4: Wrist E/F with FA sup/pron x 15 reps with and without use of a 1#  and 2# weight  Therapeutic Exercise Activity 5: BTE program completion with all 4 attachments.    Assessment: Improved " flexibility is noted following place and hold exercises.        Plan: Continue with plan of care 1-2x/wk       Viola Villasenor OT

## 2025-04-15 ENCOUNTER — TREATMENT (OUTPATIENT)
Dept: OCCUPATIONAL THERAPY | Facility: HOSPITAL | Age: 75
End: 2025-04-15
Payer: COMMERCIAL

## 2025-04-15 DIAGNOSIS — S62.615B OPEN DISPLACED FRACTURE OF PROXIMAL PHALANX OF LEFT RING FINGER, INITIAL ENCOUNTER: ICD-10-CM

## 2025-04-15 PROCEDURE — 97110 THERAPEUTIC EXERCISES: CPT | Mod: GO | Performed by: OCCUPATIONAL THERAPIST

## 2025-04-15 ASSESSMENT — PAIN - FUNCTIONAL ASSESSMENT: PAIN_FUNCTIONAL_ASSESSMENT: 0-10

## 2025-04-15 ASSESSMENT — PAIN SCALES - GENERAL: PAINLEVEL_OUTOF10: 6

## 2025-04-15 NOTE — PROGRESS NOTES
"    Occupational Therapy  Occupational Therapy Treatment    Patient Name: Khurram Nicole  MRN: [id number]  Today's Date: 4/15/2025  Time Calculation  Start Time: 1415  Stop Time: 1508  Time Calculation (min): 53 min      Time:  Time Calculation  Start Time: 1415  Stop Time: 1508  Time Calculation (min): 53 min  OT Therapeutic Procedures Time Entry  Therapeutic Exercise Time Entry: 53    Insurance:  Visit number: 4 of 12  Authorization info: 12 authorized visits through 5/16/25  Insurance Type: Payor: ORI / Plan: [employer]I MANAGED CARE ORGANIZATION / Product Type: *No Product type* /    General:  Reason for visit: L RF ray resection   Referred by: Jamin    Current Problem  1. Open displaced fracture of proximal phalanx of left ring finger, initial encounter  Follow Up In Occupational Therapy          Precautions   none      Subjective:   Patient reports \"Things are about the same.\"    Performing HEP?: Yes    Pain  Pain Assessment: 0-10  0-10 (Numeric) Pain Score: 6  Pain Type: Chronic pain  Pain Location: Hand  Pain Orientation: Left  Pain Radiating Towards: wrist, FA, and UA  Post-tx pain=5/10    Objective:      strength #2 R/L=100/59#    Physical Observation: no significant change is noted.   Edema: no change is noted.    Sensory: numbness and tingling along radial border of SF and ulnar border of LF.   Numbness/Tingling: none    Treatments:     Modalities:        Modalities  Modalities Used: Yes  Modality 1: Untimed Hotpack, Untimed Fluidotherapy with AROM and sensory retraining     Therapeutic Exercise:   53 min  Therapeutic Exercise  Therapeutic Exercise Performed: Yes  Therapeutic Exercise Activity 1: juxacizer x 5 reps  Therapeutic Exercise Activity 2: wrist and FA AROM x 20 reps each with 2# weight  Therapeutic Exercise Activity 3: place and hold flexion and extension x 10 reps  Therapeutic Exercise Activity 4: BTE program completion with all attachments    Assessment: Improvement is noted with L "  strength this p.m.      Plan: Continue with plan of care.       Viola Villasenor, OT

## 2025-04-22 ENCOUNTER — TREATMENT (OUTPATIENT)
Dept: OCCUPATIONAL THERAPY | Facility: HOSPITAL | Age: 75
End: 2025-04-22
Payer: COMMERCIAL

## 2025-04-22 DIAGNOSIS — S62.615B OPEN DISPLACED FRACTURE OF PROXIMAL PHALANX OF LEFT RING FINGER, INITIAL ENCOUNTER: ICD-10-CM

## 2025-04-22 PROCEDURE — 97110 THERAPEUTIC EXERCISES: CPT | Mod: GO | Performed by: OCCUPATIONAL THERAPIST

## 2025-04-22 ASSESSMENT — PAIN - FUNCTIONAL ASSESSMENT: PAIN_FUNCTIONAL_ASSESSMENT: 0-10

## 2025-04-22 ASSESSMENT — PAIN SCALES - GENERAL: PAINLEVEL_OUTOF10: 6

## 2025-04-22 NOTE — PROGRESS NOTES
"    Occupational Therapy  Occupational Therapy Treatment    Patient Name: Khurram Nicole  MRN: [id number]  Today's Date: 4/22/2025  Time Calculation  Start Time: 1025  Stop Time: 1115  Time Calculation (min): 50 min      Time:  Time Calculation  Start Time: 1025  Stop Time: 1115  Time Calculation (min): 50 min  OT Therapeutic Procedures Time Entry  Therapeutic Exercise Time Entry: 50    Insurance:  Visit number: 5 of 12  Authorization info: authorization through 5/16/25  Insurance Type: Payor: ORI / Plan: [employer]I MANAGED CARE ORGANIZATION / Product Type: *No Product type* /    General:  Reason for visit: L RF Ray resection   Referred by: Jamin    Current Problem  1. Open displaced fracture of proximal phalanx of left ring finger, initial encounter  Follow Up In Occupational Therapy          Precautions  none       Subjective:   Patient reports \"Nothing has changed.\"    Performing HEP?: Yes    Pain  Pain Assessment: 0-10  0-10 (Numeric) Pain Score: 6  Pain Type: Chronic pain  Pain Location: Hand  Pain Orientation: Left  Pain Radiating Towards: wrist, FA, and UA  Post-tx pain=6/10    Objective:      strength #2 R/L=100/57#    Physical Observation: intact   Edema: none    Sensory: intact   Numbness/Tingling: none    Treatments:     Modalities:        Modalities  Modalities Used: Yes  Modality 1: Untimed Fluidotherapy with AROM and sensory retraining     Therapeutic Exercise:   50 min  Therapeutic Exercise  Therapeutic Exercise Performed: Yes  Therapeutic Exercise Activity 1: wrist and FA AROM x 15 reps with and without use of 2# weight  Therapeutic Exercise Activity 2: digiflex x 20 reps each with all  Therapeutic Exercise Activity 3: place and hold digit flexion and extension x 10 reps each  Therapeutic Exercise Activity 4: BTE ptorgram with all attachments    Assessment: No significant pain is noted in L  strength this a.m.      Plan: Continue with plan of care 1-2x/wk       Viola Villasenor OT  "

## 2025-04-24 ENCOUNTER — OFFICE VISIT (OUTPATIENT)
Dept: ORTHOPEDIC SURGERY | Facility: HOSPITAL | Age: 75
End: 2025-04-24
Payer: COMMERCIAL

## 2025-04-24 DIAGNOSIS — S62.615B OPEN DISPLACED FRACTURE OF PROXIMAL PHALANX OF LEFT RING FINGER, INITIAL ENCOUNTER: Primary | ICD-10-CM

## 2025-04-24 PROCEDURE — 99212 OFFICE O/P EST SF 10 MIN: CPT | Performed by: ORTHOPAEDIC SURGERY

## 2025-04-24 ASSESSMENT — ENCOUNTER SYMPTOMS
SINUS PAIN: 0
JOINT SWELLING: 0
SHORTNESS OF BREATH: 0
FATIGUE: 0
SORE THROAT: 0
FEVER: 0
WOUND: 0
CHILLS: 0
ARTHRALGIAS: 1
WHEEZING: 0

## 2025-04-24 ASSESSMENT — PAIN SCALES - GENERAL: PAINLEVEL_OUTOF10: 8

## 2025-04-24 ASSESSMENT — PAIN - FUNCTIONAL ASSESSMENT: PAIN_FUNCTIONAL_ASSESSMENT: 0-10

## 2025-04-24 NOTE — PROGRESS NOTES
Reason for Appointment  Chief Complaint   Patient presents with    Left Hand - Post-op, Pain     History of Present Illness  Patient is a 74 y.o. male here today for a [facility] case follow-up evaluation of his left hand.   He is about 6 months status post a left ring finger ray amputation for a chronic painful and contracted digit.  He continues to have significant pain globally in the hand and some stiffness.  He is still working in occupational therapy and does feel like this has helped him and he has more motion after the sessions.  He has seen Dr. Sullivan through pain management and he has started on Topamax and she did discuss possible other treatment options including Cymbalta, ganglion stellate blocks and ketamine infusions in the future.  No other changes in his past medical history, allergies, or medications.    Medical History[1]    Surgical History[2]    Medication Documentation Review Audit       Reviewed by Tiesha Babb MD (Anesthesiologist) on 04/01/25 at 0902      Medication Order Taking? Sig Documenting Provider Last Dose Status   amLODIPine (Norvasc) 2.5 mg tablet [id number] No Take 1 tablet (2.5 mg) by mouth 2 times a day. Jcarlos Cooper MD 11/13/2024 Morning Active   Patient taking differently:  Discontinued 03/31/25 1313   aspirin 81 mg chewable tablet [id number]  Chew once daily. Jcarlos Cooper MD  Active   doxycycline (Adoxa) 100 mg tablet [id number]  Take 1 tablet (100 mg) by mouth twice a day. Jcarlos Cooper MD  Active   ergocalciferol (Vitamin D-2) 1.25 MG (23073 UT) capsule [id number] No Take 1 capsule (1,250 mcg) by mouth 1 (one) time per week. Jcarlos Cooper MD Past Week Active   Patient not taking:  Discontinued 04/01/25 0842   ibuprofen (AdviL) 200 mg tablet [id number] No Take 3 tablets (600 mg) by mouth once daily as needed. Jcarlos Cooper MD Past Week Active   Patient not taking:  Discontinued 04/01/25 0842   rosuvastatin (Crestor) 5 mg tablet [id number] No Take  1 tablet (5 mg) by mouth once daily at bedtime. Historical Provider, MD 11/12/2024 Active   Patient not taking:  Discontinued 03/31/25 1314                    RX Allergies[3]    Review of Systems   Constitutional:  Negative for chills, fatigue and fever.   HENT:  Negative for nosebleeds, sinus pain and sore throat.    Respiratory:  Negative for shortness of breath and wheezing.    Cardiovascular:  Negative for chest pain and leg swelling.   Musculoskeletal:  Positive for arthralgias. Negative for joint swelling.   Skin:  Negative for rash and wound.     Exam   On exam the left hand shows a well-healed ray amputation with minimal hypersensitivity today.  Baseline tremor from Parkinson's.  Decent composite flexion of the digits.  Good pulses and sensation in the upper extremity    Assessment   Left ring finger fracture    Plan   At this point, no further surgical intervention is warranted at this point.  His biggest issue is continued global pain throughout the hand and he should continue to work with pain management for treatment options for his chronic pain.  He can continue with one more round of formal therapy for desensitization and massage and then at that point he should be maximized from that standpoint.     I, Sugey Payan PA-C, am acting as a scribe and attest that this documentation has been prepared under the direction and in the presence of Kevin Quinones MD.    By signing below, I, Kevin Quinones MD, personally performed the services described in this documentation. All medical record entries made by the scribe were at my direction and in my presence. I have reviewed the chart and agree that the record reflects my personal performance and is accurate and complete.                       [1]   Past Medical History:  Diagnosis Date    Arthritis     Cerebral vascular accident (Multi)     Cervical disc disease     Colon polyp     Displaced fracture of proximal phalanx of left ring finger with routine healing  03/14/2025    GERD (gastroesophageal reflux disease)     Hyperlipidemia     Hypertension     Joint pain     Parkinson disease (Multi)     Spinal stenosis     Stroke (Multi)    [2]   Past Surgical History:  Procedure Laterality Date    APPENDECTOMY      COLONOSCOPY      HAND SURGERY Left    [3] No Known Allergies

## 2025-04-29 ENCOUNTER — TREATMENT (OUTPATIENT)
Dept: OCCUPATIONAL THERAPY | Facility: HOSPITAL | Age: 75
End: 2025-04-29
Payer: COMMERCIAL

## 2025-04-29 DIAGNOSIS — S62.615B OPEN DISPLACED FRACTURE OF PROXIMAL PHALANX OF LEFT RING FINGER, INITIAL ENCOUNTER: ICD-10-CM

## 2025-04-29 PROCEDURE — 97110 THERAPEUTIC EXERCISES: CPT | Mod: GO | Performed by: OCCUPATIONAL THERAPIST

## 2025-04-29 ASSESSMENT — PAIN - FUNCTIONAL ASSESSMENT: PAIN_FUNCTIONAL_ASSESSMENT: 0-10

## 2025-04-29 ASSESSMENT — PAIN SCALES - GENERAL: PAINLEVEL_OUTOF10: 6

## 2025-04-29 NOTE — PROGRESS NOTES
"    Occupational Therapy  Occupational Therapy Treatment    Patient Name: Khurram Nicole  MRN: [id number]  Today's Date: 4/29/2025  Time Calculation  Start Time: 1115  Stop Time: 1208  Time Calculation (min): 53 min    Time:  Time Calculation  Start Time: 1115  Stop Time: 1208  Time Calculation (min): 53 min  OT Therapeutic Procedures Time Entry  Therapeutic Exercise Time Entry: 53    Insurance:  Visit number: 6 of 12  Authorization info: 12 approved visits through 5/16/25  Insurance Type: Payor: [employer] / Plan: [employer] / Product Type: *No Product type* /     General:  Reason for visit: L RF Ray Resection   Referred by: Jamin    Current Problem  1. Open displaced fracture of proximal phalanx of left ring finger, initial encounter  Follow Up In Occupational Therapy          Precautions   none      Subjective:   Patient reports \"I am about the same. I painted a bedroom in my house.\"    Performing HEP?: Yes    Pain  Pain Assessment: 0-10  0-10 (Numeric) Pain Score: 6  Pain Type: Chronic pain  Pain Location: Hand  Pain Orientation: Left  Post-tx pain=6/10    Objective:     AROM: L IF PIP=-10/95, LF PIP=-25/100, and L SF PIP=0/90    Physical Observation: no significant change is noted   Edema: no significant change is noted     Sensory: no significant changes are noted.     Treatments:     Modalities:        Modalities  Modalities Used: Yes  Modality 1: Untimed Fluidotherapy with AROM and sensory retraining     Therapeutic Exercise:   53 min  Therapeutic Exercise  Therapeutic Exercise Performed: Yes  Therapeutic Exercise Activity 1: wrist and FA AROM x 20 reps with and without use of a 2# weight.  Therapeutic Exercise Activity 2: place and hold digit flexion and extension x 15 reps each  Therapeutic Exercise Activity 3: All digiflex x 20 reps each  Therapeutic Exercise Activity 4: BTE program with added resistance and time to all attachments    Assessment: Improved extension and flexion is " noted at PIP joints following manual tx. Patient paticipated in 53 minute tx session with no changes in pain.        Plan: continue with plan of care 1-2x/wk       Viola Villasenor OT

## 2025-05-01 ENCOUNTER — TREATMENT (OUTPATIENT)
Dept: OCCUPATIONAL THERAPY | Facility: HOSPITAL | Age: 75
End: 2025-05-01
Payer: COMMERCIAL

## 2025-05-01 DIAGNOSIS — S62.615B OPEN DISPLACED FRACTURE OF PROXIMAL PHALANX OF LEFT RING FINGER, INITIAL ENCOUNTER: ICD-10-CM

## 2025-05-01 PROCEDURE — 97110 THERAPEUTIC EXERCISES: CPT | Mod: GO | Performed by: OCCUPATIONAL THERAPIST

## 2025-05-01 ASSESSMENT — PAIN SCALES - GENERAL: PAINLEVEL_OUTOF10: 6

## 2025-05-01 ASSESSMENT — PAIN - FUNCTIONAL ASSESSMENT: PAIN_FUNCTIONAL_ASSESSMENT: 0-10

## 2025-05-01 NOTE — PROGRESS NOTES
"    Occupational Therapy  Occupational Therapy Treatment    Patient Name: Khurram Nicole  MRN: [id number]  Today's Date: 5/1/2025  Time Calculation  Start Time: 1500  Stop Time: 1553  Time Calculation (min): 53 min      Time:  Time Calculation  Start Time: 1500  Stop Time: 1553  Time Calculation (min): 53 min  OT Therapeutic Procedures Time Entry  Therapeutic Exercise Time Entry: 53    Insurance:  Visit number: 7 of 12  Authorization info: 12  Insurance Type: Payor: [employer]I / Plan: [place] [employer]GANIZATION / Product Type: *No Product type* /    General:  Reason for visit: L RF ray resection   Referred by: Jamin    Current Problem  1. Open displaced fracture of proximal phalanx of left ring finger, initial encounter  Follow Up In Occupational Therapy          Precautions   none      Subjective:   Patient reports \"I am about the same with everything.\"    Performing HEP?: Yes    Pain  Pain Assessment: 0-10  0-10 (Numeric) Pain Score: 6  Pain Type: Chronic pain  Pain Location: Hand  Pain Orientation: Left  Post-tx pain=5/10    Objective:     AROM: No lags to RF DPC     Physical Observation: intact   Edema: none     Sensory: no significant change is reported   Numbness/Tingling: no significant change is reported    Treatments:     Modalities:        Modalities  Modalities Used: Yes  Modality 1: Untimed Fluidotherapy with AROM and sensory retraining     Therapeutic Exercise:   53 min  Therapeutic Exercise  Therapeutic Exercise Performed: Yes  Therapeutic Exercise Activity 1: wrist and FA AROM x 20 reps with and without use of 2# weight  Therapeutic Exercise Activity 2: place and hold digit flexion and extension x 15 reps with emphasis on RF  Therapeutic Exercise Activity 3: all digiflex x 30 reps each  Therapeutic Exercise Activity 4: BTE program with all attachments    Assessment: Improved AROM of all digits is noted following place and hold exercises.      Plan: Continue with plan of care 1-2x/wk   "     Viola Villasenor, OT

## 2025-05-06 ENCOUNTER — TREATMENT (OUTPATIENT)
Dept: OCCUPATIONAL THERAPY | Facility: HOSPITAL | Age: 75
End: 2025-05-06
Payer: COMMERCIAL

## 2025-05-06 DIAGNOSIS — S62.615B OPEN DISPLACED FRACTURE OF PROXIMAL PHALANX OF LEFT RING FINGER, INITIAL ENCOUNTER: ICD-10-CM

## 2025-05-06 PROCEDURE — 97110 THERAPEUTIC EXERCISES: CPT | Mod: GO | Performed by: OCCUPATIONAL THERAPIST

## 2025-05-06 ASSESSMENT — PAIN - FUNCTIONAL ASSESSMENT: PAIN_FUNCTIONAL_ASSESSMENT: 0-10

## 2025-05-06 ASSESSMENT — PAIN SCALES - GENERAL: PAINLEVEL_OUTOF10: 6

## 2025-05-06 NOTE — PROGRESS NOTES
"    Occupational Therapy  Occupational Therapy Treatment    Patient Name: Khurram Nicole  MRN: [id number]  Today's Date: 5/6/2025  Time Calculation  Start Time: 1110  Stop Time: 1203  Time Calculation (min): 53 min      Time:  Time Calculation  Start Time: 1110  Stop Time: 1203  Time Calculation (min): 53 min  OT Therapeutic Procedures Time Entry  Therapeutic Exercise Time Entry: 53    Insurance:  Visit number: 8 of 12  Authorization info: 12 visits approved through 5/16/25  Insurance Type: Payor: [employer] / Plan: [employer]GANIZATION / Product Type: *No Product type* /    General:  Reason for visit: L RF ray resection   Referred by: Jamin    Current Problem  1. Open displaced fracture of proximal phalanx of left ring finger, initial encounter  Follow Up In Occupational Therapy          Precautions   none      Subjective:   Patient reports \"I hurt more when it is wet and cold.\"    Performing HEP?: Yes    Pain  Pain Assessment: 0-10  0-10 (Numeric) Pain Score: 6  Pain Type: Chronic pain  Pain Location: Hand  Pain Orientation: Left  Post-tx pain=5/10    Objective:     AROM: L wrist E/F pre-fluido=63/62, post-fluido=67/65    Physical Observation: no significant change   Edema: no change is noted     Sensory: c/o tingling, burning, and numbness in radial SF and LF ulnar borders   Numbness/Tingling: impaired with complaints involving radial SF and LF ulnar borders.     Treatments:     Modalities:        Modalities  Modalities Used: Yes  Modality 1: Untimed Fluidotherapy with AROM and sensory retraining     Therapeutic Exercise:   53 min  Therapeutic Exercise  Therapeutic Exercise Performed: Yes  Therapeutic Exercise Activity 1: wrist and FA AROM x 20 reps with and without use of 2# weight  Therapeutic Exercise Activity 2: place and hold digit flexion and extension x 10 reps each  Therapeutic Exercise Activity 3: all digiflex x 30 reps each  Therapeutic Exercise Activity 4: BTE program with 4 " attachments  Therapeutic Exercise Activity 5: juxacizer x 5 reps    Assessment: No change is noted with pain or sensory distribution this a.m.        Plan: continue with plan of care 1-2x/wk.        Viola Villasenor OT

## 2025-05-08 ENCOUNTER — APPOINTMENT (OUTPATIENT)
Dept: OCCUPATIONAL THERAPY | Facility: HOSPITAL | Age: 75
End: 2025-05-08
Payer: COMMERCIAL

## 2025-05-09 ENCOUNTER — TREATMENT (OUTPATIENT)
Dept: OCCUPATIONAL THERAPY | Facility: HOSPITAL | Age: 75
End: 2025-05-09
Payer: COMMERCIAL

## 2025-05-09 DIAGNOSIS — S62.615B OPEN DISPLACED FRACTURE OF PROXIMAL PHALANX OF LEFT RING FINGER, INITIAL ENCOUNTER: ICD-10-CM

## 2025-05-09 PROCEDURE — 97110 THERAPEUTIC EXERCISES: CPT | Mod: GO | Performed by: OCCUPATIONAL THERAPIST

## 2025-05-09 ASSESSMENT — PAIN SCALES - GENERAL: PAINLEVEL_OUTOF10: 6

## 2025-05-09 ASSESSMENT — PAIN - FUNCTIONAL ASSESSMENT: PAIN_FUNCTIONAL_ASSESSMENT: 0-10

## 2025-05-09 NOTE — PROGRESS NOTES
"    Occupational Therapy  Occupational Therapy Treatment    Patient Name: Khurram Nicole  MRN: [id number]  Today's Date: 5/9/2025         Time:  OT Therapeutic Procedures Time Entry  Therapeutic Exercise Time Entry: 50    Insurance:  Visit number: 9 of 12  Authorization info: 12 authorized visits through 5/16/25  Insurance Type: Payor: [employer] / Plan: [employer]IZATION / Product Type: *No Product type* /    General:  Reason for visit: L RF Ray Resection   Referred by: Jamin    Current Problem  1. Open displaced fracture of proximal phalanx of left ring finger, initial encounter  Follow Up In Occupational Therapy          Precautions   none      Subjective:   Patient reports \"My pain is still a 6/10.\"    Performing HEP?: Yes    Pain  Pain Assessment: 0-10  0-10 (Numeric) Pain Score: 6  Pain Type: Chronic pain  Pain Location: Hand  Pain Orientation: Left  Post-tx pain=5/10    Objective:      strength #2 R/L=104/65#    Physical Observation: intact skin with no changes   Edema: no significant change     Sensory: no significant change   Numbness/Tingling: no significant change     Treatments:     Modalities:        Modalities  Modalities Used: Yes  Modality 1: Untimed Fluidotherapy with AROM promotion     Therapeutic Exercise:   50 min  Therapeutic Exercise  Therapeutic Exercise Performed: Yes  Therapeutic Exercise Activity 1: wrist and FA x 20 reps with and without use of 2# weight  Therapeutic Exercise Activity 2: BTE program with all attachments.  Therapeutic Exercise Activity 3: prayer wrist extension x 10 reps    Assessment: No significant change in pain or sensory changes is reported. Improvement is noted with functional  strength this a.m.      Plan: Continue with plan of care 1-2x/wk.       Viola Villasenor OT  "

## 2025-05-13 ENCOUNTER — TREATMENT (OUTPATIENT)
Dept: OCCUPATIONAL THERAPY | Facility: HOSPITAL | Age: 75
End: 2025-05-13
Payer: COMMERCIAL

## 2025-05-13 DIAGNOSIS — S62.615B OPEN DISPLACED FRACTURE OF PROXIMAL PHALANX OF LEFT RING FINGER, INITIAL ENCOUNTER: ICD-10-CM

## 2025-05-13 PROCEDURE — 97110 THERAPEUTIC EXERCISES: CPT | Mod: GO | Performed by: OCCUPATIONAL THERAPIST

## 2025-05-13 ASSESSMENT — PAIN - FUNCTIONAL ASSESSMENT: PAIN_FUNCTIONAL_ASSESSMENT: 0-10

## 2025-05-13 ASSESSMENT — PAIN SCALES - GENERAL: PAINLEVEL_OUTOF10: 5 - MODERATE PAIN

## 2025-05-13 NOTE — PROGRESS NOTES
"    Occupational Therapy  Occupational Therapy Treatment    Patient Name: Khurram Nicole  MRN: [id number]  Today's Date: 5/13/2025  Time Calculation  Start Time: 1115  Stop Time: 1205  Time Calculation (min): 50 min        Time:  Time Calculation  Start Time: 1115  Stop Time: 1205  Time Calculation (min): 50 min  OT Therapeutic Procedures Time Entry  Therapeutic Exercise Time Entry: 50    Insurance:  Visit number: 10 of 12  Authorization info: 12 authorized visits through 5/16/25  Insurance Type: Payor: [employer] / Plan: [employer]IZATION / Product Type: *No Product type* /    General:  Reason for visit: L RF ray resection   Referred by: Jamin    Current Problem  1. Open displaced fracture of proximal phalanx of left ring finger, initial encounter  Follow Up In Occupational Therapy          Precautions   none      Subjective:   Patient reports \"Things are the same.\"    Performing HEP?: Yes    Pain  Pain Assessment: 0-10  0-10 (Numeric) Pain Score: 5 - Moderate pain  Pain Type: Chronic pain  Pain Location: Hand  Pain Orientation: Left  Post-tx pain=5/10    Objective:      strength #2 R/L=97/59#  Physical Observation: intact skin   Edema: no significant change     Sensory: no significant change   Numbness/Tingling: no change is noted     Treatments:     Modalities:        Modalities  Modalities Used: Yes  Modality 1: Untimed Fluidotherapy with AROM promotion     Therapeutic Exercise:   50 min  Therapeutic Exercise  Therapeutic Exercise Performed: Yes  Therapeutic Exercise Activity 1: wrist and FA AROM x 20 reps each with 2# weight  Therapeutic Exercise Activity 2: BTE program with added resistance to all attachments  Therapeutic Exercise Activity 3: juxacizer x 5 reps  Therapeutic Exercise Activity 4: place and hold digit flexion and extension x 10 reps  Therapeutic Exercise Activity 5: digiflex x 30 reps each with yellow-black    Assessment: Decreased  strength is noted in BUE's this a.m.    "     Plan: Continue with plan of care with discharge following next appointment.        Viola Villasenor, OT

## 2025-05-15 ENCOUNTER — TREATMENT (OUTPATIENT)
Dept: OCCUPATIONAL THERAPY | Facility: HOSPITAL | Age: 75
End: 2025-05-15
Payer: COMMERCIAL

## 2025-05-15 DIAGNOSIS — S62.615B OPEN DISPLACED FRACTURE OF PROXIMAL PHALANX OF LEFT RING FINGER, INITIAL ENCOUNTER: ICD-10-CM

## 2025-05-15 PROCEDURE — 97110 THERAPEUTIC EXERCISES: CPT | Mod: GO | Performed by: OCCUPATIONAL THERAPIST

## 2025-05-15 ASSESSMENT — PAIN - FUNCTIONAL ASSESSMENT: PAIN_FUNCTIONAL_ASSESSMENT: 0-10

## 2025-05-15 ASSESSMENT — PAIN SCALES - GENERAL: PAINLEVEL_OUTOF10: 5 - MODERATE PAIN

## 2025-05-15 NOTE — PROGRESS NOTES
"    Occupational Therapy  Occupational Therapy Treatment    Patient Name: Khurram Nicole  MRN: [id number]  Today's Date: 5/15/2025  Time Calculation  Start Time: 1300  Stop Time: 1355  Time Calculation (min): 55 min      Time:  Time Calculation  Start Time: 1300  Stop Time: 1355  Time Calculation (min): 55 min  OT Therapeutic Procedures Time Entry  Therapeutic Exercise Time Entry: 55    Insurance:  Visit number: 11 of 11  Authorization info: C9 expires on 5?16/25  Insurance Type: Payor: [employer] / Plan: [employer]ATION / Product Type: *No Product type* /    General:  Reason for visit: L RF Ray Resection   Referred by: Jamin    Current Problem  1. Open displaced fracture of proximal phalanx of left ring finger, initial encounter  Follow Up In Occupational Therapy          Precautions   none      Subjective:   Patient reports \"Things are the same.\"    Performing HEP?: Yes    Pain  Pain Assessment: 0-10  0-10 (Numeric) Pain Score: 5 - Moderate pain  Pain Type: Chronic pain  Pain Location: Hand  Pain Orientation: Left  Post-tx pain=5/10    Objective:     AROM: L IF MP=0/80, PIP=-20/100, DIP=0/80, Lag to DPC=0 cm   L LF MP=-10/80, PIP=-27/102, DIP=0/75, Lag to DPC=0 cm   L SF MP=0/85, PIP=0/100, DIP=0/77, Lag to DPC=0 CM     Wrist E/F=67/65     strength #2 R/L=97/56#    Physical Observation: intact incision   Edema: none significant     Sensory: c/o hypersensitivity to touch along ulnar border of LF and radial border of SF.     Treatments:     Modalities:        Modalities  Modalities Used: Yes  Modality 1: Untimed Fluidotherapy with AROM promotion     Therapeutic Exercise:   55 min  Therapeutic Exercise  Therapeutic Exercise Performed: Yes  Therapeutic Exercise Activity 1: Juxacizer x 5 reps  Therapeutic Exercise Activity 2: wrist and FA AROM x 20 reps each with and without use of 2# weight  Therapeutic Exercise Activity 3: BTE program with all 4 attahments  Therapeutic Exercise Activity 4: all " digiflex x 30 reps each with yellow-black      Assessment: patient has demonstrated no significant change in L hand strength or pain complaints since initial evaluation on 3/13/25. He has demonstrated gains in AROM of all digits and wrist with use of heat and PROM/AAROM. Arthritic stiffness and parkinson's disease contribute to L hand and wrist stiffness. Patient is independent with all home exercises and understands benefits of daily ROM exercises. He reports being independent with all basica ADL's and advanced ADL's. Patient has met his maximum rehabilitative potential with no additional TX being warranted.        Plan: Discharge with follow-up as needed.        Viola Villasenor, OT

## 2025-05-20 ENCOUNTER — APPOINTMENT (OUTPATIENT)
Dept: OCCUPATIONAL THERAPY | Facility: HOSPITAL | Age: 75
End: 2025-05-20
Payer: COMMERCIAL

## 2025-05-27 ENCOUNTER — APPOINTMENT (OUTPATIENT)
Dept: OCCUPATIONAL THERAPY | Facility: HOSPITAL | Age: 75
End: 2025-05-27
Payer: COMMERCIAL

## 2025-05-29 DIAGNOSIS — G56.42 COMPLEX REGIONAL PAIN SYNDROME TYPE 2 OF LEFT UPPER EXTREMITY: ICD-10-CM

## 2025-05-29 RX ORDER — TOPIRAMATE 50 MG/1
50 TABLET, FILM COATED ORAL 2 TIMES DAILY
Qty: 60 TABLET | Refills: 5 | Status: SHIPPED | OUTPATIENT
Start: 2025-05-29 | End: 2025-06-28

## 2025-06-03 ENCOUNTER — APPOINTMENT (OUTPATIENT)
Dept: OCCUPATIONAL THERAPY | Facility: HOSPITAL | Age: 75
End: 2025-06-03

## 2025-06-10 ENCOUNTER — APPOINTMENT (OUTPATIENT)
Dept: OCCUPATIONAL THERAPY | Facility: HOSPITAL | Age: 75
End: 2025-06-10

## 2025-06-12 ENCOUNTER — OFFICE VISIT (OUTPATIENT)
Dept: ORTHOPEDIC SURGERY | Facility: HOSPITAL | Age: 75
End: 2025-06-12
Payer: COMMERCIAL

## 2025-06-12 DIAGNOSIS — M79.642 LEFT HAND PAIN: ICD-10-CM

## 2025-06-12 DIAGNOSIS — S62.615B OPEN DISPLACED FRACTURE OF PROXIMAL PHALANX OF LEFT RING FINGER, INITIAL ENCOUNTER: ICD-10-CM

## 2025-06-12 PROCEDURE — 99212 OFFICE O/P EST SF 10 MIN: CPT | Performed by: ORTHOPAEDIC SURGERY

## 2025-06-12 ASSESSMENT — PAIN - FUNCTIONAL ASSESSMENT: PAIN_FUNCTIONAL_ASSESSMENT: 0-10

## 2025-06-12 ASSESSMENT — PAIN SCALES - GENERAL: PAINLEVEL_OUTOF10: 5 - MODERATE PAIN

## 2025-06-12 ASSESSMENT — COLUMBIA-SUICIDE SEVERITY RATING SCALE - C-SSRS
1. IN THE PAST MONTH, HAVE YOU WISHED YOU WERE DEAD OR WISHED YOU COULD GO TO SLEEP AND NOT WAKE UP?: NO
2. HAVE YOU ACTUALLY HAD ANY THOUGHTS OF KILLING YOURSELF?: NO

## 2025-06-12 ASSESSMENT — ENCOUNTER SYMPTOMS
BRUISES/BLEEDS EASILY: 0
CHILLS: 0
ARTHRALGIAS: 1
FEVER: 0
WHEEZING: 0
FATIGUE: 0
SHORTNESS OF BREATH: 0

## 2025-06-12 NOTE — PROGRESS NOTES
Reason for Appointment  Left hand pain    History of Present Illness  Patient is a 74 y.o. male here today for follow-up evaluation of left hand pain, 7 months out from a left ring finger ray amputation for a chronic painful and contracted digit. This is a [facility] [place] case. We last saw the patient on 4/24/25 when he was 6 months out and we discussed formal therapy. Today He has good motion, pain is mostly his issue. In the horse show scar. He has lost triceps muscle because he is not using the arm. He is holding his arm in the flexed position. He feels he torn something in the biceps. He states Topamax did not help him. No recent falls or injuries. No other changes in past medical history, allergies, or medications.      Medical History[1]    Surgical History[2]    Medication Documentation Review Audit       Reviewed by Kevin Quinones MD (Physician) on 04/26/25 at 0939      Medication Order Taking? Sig Documenting Provider Last Dose Status   amantadine (Symmetrel) 100 mg tablet [id number]  1 tab daily in the am x 1 week then 1 tab 2 times a day (am and early afternoon) thereafter Bobby Birmingham, APRN-CNP  Active   amLODIPine (Norvasc) 2.5 mg tablet [id number] No Take 1 tablet (2.5 mg) by mouth 2 times a day. Historical Provider, MD 11/13/2024 Morning Active   aspirin 81 mg chewable tablet [id number]  Chew once daily. Historical Provider, MD  Active   ergocalciferol (Vitamin D-2) 1.25 MG (88200 UT) capsule [id number] No Take 1 capsule (1,250 mcg) by mouth 1 (one) time per week. Jcarlos Cooper MD Past Week Active   ibuprofen (AdviL) 200 mg tablet [id number] No Take 3 tablets (600 mg) by mouth once daily as needed. Jcarlos Cooper MD Past Week Active   rosuvastatin (Crestor) 5 mg tablet [id number] No Take 1 tablet (5 mg) by mouth once daily at bedtime. Jcarlos Cooper MD 11/12/2024 Active   topiramate (Topamax) 25 mg tablet [id number]  One at bedtime x 1  week , one in the am and one at night x 1 week, one in the  am and two at bedtime ax 1 week, then 2 bid from then on . Please call for refill Tiesha Babb MD  Active                    RX Allergies[3]    Review of Systems   Constitutional:  Negative for chills, fatigue and fever.   Respiratory:  Negative for shortness of breath and wheezing.    Cardiovascular:  Negative for chest pain and leg swelling.   Musculoskeletal:  Positive for arthralgias.   Allergic/Immunologic: Negative for immunocompromised state.   Hematological:  Does not bruise/bleed easily.       Exam   Good pulses and sensation. Scar tissue build up at the ray amputation site. Tenderness in the hughes space. No signs of infection. Good cap refill. Mild contracture of the remaining digits. Lack 15 degrees of composite flexion.     Assessment   Encounter Diagnoses   Name Primary?    Open displaced fracture of proximal phalanx of left ring finger, initial encounter     Left hand pain        Plan     At this point I will place a C9 one tendon sheath injection at the amputation site.  He has tried other pain modalities but I spoke with the patient and his wife and I encouraged him to use this for all activities of daily living    I, Viola Ford, attest that this documentation has been prepared under the direction and in the presence of Kevin Quinones MD.   By signing below, I, Kevin Quinones MD, personally performed the services described in this documentation. All medical record entries made by the scribe were at my direction and in my presence. I have reviewed the chart and agree that the record reflects my personal performance and is accurate and complete.         [1]   Past Medical History:  Diagnosis Date    Arthritis     Cerebral vascular accident (Multi)     Cervical disc disease     Colon polyp     Displaced fracture of proximal phalanx of left ring finger with routine healing 03/14/2025    GERD (gastroesophageal reflux disease)     Hyperlipidemia     Hypertension     Joint pain     Parkinson disease  (Multi)     Spinal stenosis     Stroke (Multi)    [2]   Past Surgical History:  Procedure Laterality Date    APPENDECTOMY      COLONOSCOPY      HAND SURGERY Left    [3] No Known Allergies

## 2025-06-17 ENCOUNTER — APPOINTMENT (OUTPATIENT)
Dept: OCCUPATIONAL THERAPY | Facility: HOSPITAL | Age: 75
End: 2025-06-17

## 2025-07-01 ENCOUNTER — OFFICE VISIT (OUTPATIENT)
Dept: PAIN MEDICINE | Facility: HOSPITAL | Age: 75
End: 2025-07-01
Payer: COMMERCIAL

## 2025-07-01 DIAGNOSIS — G89.4 CHRONIC PAIN SYNDROME: Primary | ICD-10-CM

## 2025-07-01 DIAGNOSIS — S67.22XS CRUSHING INJURY OF LEFT HAND, SEQUELA: ICD-10-CM

## 2025-07-01 DIAGNOSIS — M79.2 NEUROPATHIC PAIN: ICD-10-CM

## 2025-07-01 PROCEDURE — 99214 OFFICE O/P EST MOD 30 MIN: CPT | Performed by: ANESTHESIOLOGY

## 2025-07-01 ASSESSMENT — PAIN SCALES - GENERAL: PAINLEVEL_OUTOF10: 7

## 2025-07-02 NOTE — PROGRESS NOTES
Chief Complaint   Patient presents with   • Extremity Pain        HPI     ROS: 13 point review of systems is complete and is negative listed above in HPI    Medical History[1]    Surgical History[2]    Family History[3]    Social History[4]    Medications Ordered Prior to Encounter[5]     RX Allergies[6]       Imaging:    Physical Exam:  Gen.: Patient appears to be stated age, fair hygiene  Eyes: Pupils are symmetric, conjunctiva is nonicteric and lids without obvious drooping or rash  ENT: Hearing is grossly intact, external ears and nose appear to be without deformity or rash. No lesions or masses noted.  Neck: No JVD noted, tracheal position is midline  Respiratory: No gasping or shortness of breath noted, no use of accessory muscles noted  Cardiovascular: Extremity show no edema or varicosities  Lymph: No lymphadenopathy noted in the anterior cervical regions bilaterally  Skin no rashes or open lesions or ulcers identified on the skin  Musculoskeletal: Gait is grossly normal  Neurologic: Cranial nerves II through XII are grossly intact  Psychiatric:  Patient is alert and oriented x3    Impression/Plan:               [1]  Past Medical History:  Diagnosis Date   • Arthritis    • Cerebral vascular accident (Multi)    • Cervical disc disease    • Colon polyp    • Displaced fracture of proximal phalanx of left ring finger with routine healing 03/14/2025   • GERD (gastroesophageal reflux disease)    • Hyperlipidemia    • Hypertension    • Joint pain    • Parkinson disease (Multi)    • Spinal stenosis    • Stroke (Multi)    [2]  Past Surgical History:  Procedure Laterality Date   • APPENDECTOMY     • COLONOSCOPY     • HAND SURGERY Left    [3]  Family History  Problem Relation Name Age of Onset   • Heart disease Mother     • Coronary artery disease Father     • Colon cancer Father     • Kidney failure Father     • Transient ischemic attack Sister     [4]  Social History  Tobacco Use   • Smoking status: Never   •  Smokeless tobacco: Never   Vaping Use   • Vaping status: Never Used   Substance Use Topics   • Alcohol use: Never   • Drug use: Never   [5]  Current Outpatient Medications on File Prior to Visit   Medication Sig Dispense Refill   • amantadine (Symmetrel) 100 mg tablet 1 tab daily in the am x 1 week then 1 tab 2 times a day (am and early afternoon) thereafter 60 tablet 3   • amLODIPine (Norvasc) 2.5 mg tablet Take 1 tablet (2.5 mg) by mouth 2 times a day.     • aspirin 81 mg chewable tablet Chew once daily.     • ergocalciferol (Vitamin D-2) 1.25 MG (84488 UT) capsule Take 1 capsule (1,250 mcg) by mouth 1 (one) time per week.     • ibuprofen (AdviL) 200 mg tablet Take 3 tablets (600 mg) by mouth once daily as needed.     • rosuvastatin (Crestor) 5 mg tablet Take 1 tablet (5 mg) by mouth once daily at bedtime.     • [DISCONTINUED] topiramate (Topamax) 50 mg tablet Take 1 tablet (50 mg) by mouth 2 times a day. 60 tablet 5     No current facility-administered medications on file prior to visit.   [6]  No Known Allergies

## 2025-07-06 NOTE — PROGRESS NOTES
Chief Complaint   Patient presents with    Extremity Pain      HPI   Khurram is a 74-year-old male with a history of hand pain on the left side.  The patient had a traumatic injury and had loss of the digit.  He has a [employer] case and after his last visit we initiated topiramate and talked about using a TENS device with a glove.  The patient says that the TENS device did not help because even an extra-large glove would not fit over his hand by his report.  The patient does note that he had improved symptoms/function/pain control when he was doing active physical therapy.  He says that his symptoms and pain went from 8 out of 10 to a 5 or 6 out of 10.  He has been using putty in his hand and doing some exercises at home but feels it was better and more function was gained while doing active treatment in a formal setting.  The patient says that Dr. Mckeon was planning to do some sort of injection that got approved through [employer] in the next week.  He notes that he was just started on Celexa for mood and they are interested in taking something for sleep.  He has not been on Cymbalta in the past or amitriptyline.  We had discussed the potential for ketamine infusion.    ROS: 13 point review of systems is complete and is negative listed above in HPI    Medical History[1]    Surgical History[2]    Family History[3]    Social History[4]    Medications Ordered Prior to Encounter[5]     RX Allergies[6]       Imaging:  Narrative & Impression   Interpreted By:  Dale Marrero,   STUDY:  XR HAND LEFT 3+ VIEWS; ;  7/11/2024 2:25 pm      INDICATION:  Signs/Symptoms:pain.      COMPARISON:  None.      ACCESSION NUMBER(S):  [id number]      ORDERING CLINICIAN:  DIOMEDES MCKEON      FINDINGS:  Left hand, three views      Postsurgical changes status post fracture fixation of the 4th  proximal phalangeal fracture. The hardware is intact. There is no  acute fracture or dislocation. Soft tissue edema about the 4th  digit  and about the hand.      There is moderate joint space narrowing with osteophyte formation in  the 3rd MCP joint and in the 1st CMC and 1st IP joints.      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  Patient has longer nails on the left as compared to the right.  Patient does have swelling in the nondominant distal extremity.  Patient has hypersensitivity/allodynia on the left only.  Neurologic: Cranial nerves II through XII are grossly intact  Psychiatric: Flat affect    Impression/Plan:  74-year-old male with a history of a traumatic injury to the left hand who has a [employer] case.  Patient definitely has neuropathic pain and symptoms that could be in line with complex regional pain syndrome.    - The patient just started on Celexa for mood, could consider switch to Cymbalta which could be used to treat mood and pain.    - Other potential options could be to consider amitriptyline low-dose for pain and sleep.    - Will plan to get a topical compounding cream ordered that has ketamine included.  Risk and benefit reviewed, side effects discussed.    - Will order C9 for PT OT as patient had significant improvement with mobility when he was having this more formally.           [1]   Past Medical History:  Diagnosis Date    Arthritis     Cerebral vascular accident (Multi)     Cervical disc disease     Colon polyp     Displaced fracture of proximal phalanx of left ring finger with routine healing 03/14/2025    GERD  (gastroesophageal reflux disease)     Hyperlipidemia     Hypertension     Joint pain     Parkinson disease (Multi)     Spinal stenosis     Stroke (Multi)    [2]   Past Surgical History:  Procedure Laterality Date    APPENDECTOMY      COLONOSCOPY      HAND SURGERY Left    [3]   Family History  Problem Relation Name Age of Onset    Heart disease Mother      Coronary artery disease Father      Colon cancer Father      Kidney failure Father      Transient ischemic attack Sister     [4]   Social History  Tobacco Use    Smoking status: Never    Smokeless tobacco: Never   Vaping Use    Vaping status: Never Used   Substance Use Topics    Alcohol use: Never    Drug use: Never   [5]   Current Outpatient Medications on File Prior to Visit   Medication Sig Dispense Refill    amantadine (Symmetrel) 100 mg tablet 1 tab daily in the am x 1 week then 1 tab 2 times a day (am and early afternoon) thereafter 60 tablet 3    amLODIPine (Norvasc) 2.5 mg tablet Take 1 tablet (2.5 mg) by mouth 2 times a day.      aspirin 81 mg chewable tablet Chew once daily.      ergocalciferol (Vitamin D-2) 1.25 MG (41416 UT) capsule Take 1 capsule (1,250 mcg) by mouth 1 (one) time per week.      ibuprofen (AdviL) 200 mg tablet Take 3 tablets (600 mg) by mouth once daily as needed.      rosuvastatin (Crestor) 5 mg tablet Take 1 tablet (5 mg) by mouth once daily at bedtime.      [DISCONTINUED] topiramate (Topamax) 50 mg tablet Take 1 tablet (50 mg) by mouth 2 times a day. 60 tablet 5     No current facility-administered medications on file prior to visit.   [6] No Known Allergies

## 2025-07-08 ENCOUNTER — OFFICE VISIT (OUTPATIENT)
Dept: ORTHOPEDIC SURGERY | Facility: CLINIC | Age: 75
End: 2025-07-08
Payer: COMMERCIAL

## 2025-07-08 DIAGNOSIS — G56.42 COMPLEX REGIONAL PAIN SYNDROME TYPE 2 OF LEFT UPPER EXTREMITY: Primary | ICD-10-CM

## 2025-07-08 DIAGNOSIS — S62.615B OPEN DISPLACED FRACTURE OF PROXIMAL PHALANX OF LEFT RING FINGER, INITIAL ENCOUNTER: Primary | ICD-10-CM

## 2025-07-08 PROCEDURE — 20550 NJX 1 TENDON SHEATH/LIGAMENT: CPT | Mod: LT | Performed by: ORTHOPAEDIC SURGERY

## 2025-07-08 PROCEDURE — 2500000004 HC RX 250 GENERAL PHARMACY W/ HCPCS (ALT 636 FOR OP/ED): Mod: JW | Performed by: ORTHOPAEDIC SURGERY

## 2025-07-08 PROCEDURE — 99213 OFFICE O/P EST LOW 20 MIN: CPT | Performed by: ORTHOPAEDIC SURGERY

## 2025-07-08 RX ORDER — CITALOPRAM 10 MG/1
10 TABLET ORAL
COMMUNITY
Start: 2025-06-04 | End: 2025-09-02

## 2025-07-08 RX ORDER — AMITRIPTYLINE HYDROCHLORIDE 25 MG/1
25 TABLET, FILM COATED ORAL NIGHTLY
Qty: 30 TABLET | Refills: 6 | Status: SHIPPED | OUTPATIENT
Start: 2025-07-08

## 2025-07-08 ASSESSMENT — PAIN SCALES - GENERAL: PAINLEVEL_OUTOF10: 7

## 2025-07-08 ASSESSMENT — PAIN - FUNCTIONAL ASSESSMENT: PAIN_FUNCTIONAL_ASSESSMENT: 0-10

## 2025-07-10 PROCEDURE — 20550 NJX 1 TENDON SHEATH/LIGAMENT: CPT | Performed by: ORTHOPAEDIC SURGERY

## 2025-07-10 RX ORDER — METHYLPREDNISOLONE ACETATE 40 MG/ML
20 INJECTION, SUSPENSION INTRA-ARTICULAR; INTRALESIONAL; INTRAMUSCULAR; SOFT TISSUE
Status: COMPLETED | OUTPATIENT
Start: 2025-07-10 | End: 2025-07-10

## 2025-07-10 RX ORDER — LIDOCAINE HYDROCHLORIDE 10 MG/ML
1 INJECTION, SOLUTION INFILTRATION; PERINEURAL
Status: COMPLETED | OUTPATIENT
Start: 2025-07-10 | End: 2025-07-10

## 2025-07-10 RX ADMIN — METHYLPREDNISOLONE ACETATE 20 MG: 40 INJECTION, SUSPENSION INTRA-ARTICULAR; INTRALESIONAL; INTRAMUSCULAR; SOFT TISSUE at 07:34

## 2025-07-10 RX ADMIN — LIDOCAINE HYDROCHLORIDE 1 ML: 10 INJECTION, SOLUTION INFILTRATION; PERINEURAL at 07:34

## 2025-07-10 NOTE — PROGRESS NOTES
Reason for Appointment  Chief Complaint   Patient presents with    Left Ring Finger - Injections     History of Present Illness  Patient is here today for injection at the amputation site    Assessment   Status post ray amputation        Patient ID: Khurram Nicole is a 74 y.o. male.    Hand / UE Inj/Asp: L ring A1 for trigger finger on 7/10/2025 7:34 AM  Indications: pain  Details: 25 G needle  Medications: 1 mL lidocaine 10 mg/mL (1 %); 20 mg methylPREDNISolone acetate 40 mg/mL  Outcome: tolerated well, no immediate complications    After discussing the risks and benefits of the procedure we proceeded with the injection.  We then sterilely injected the left ring finger A1 pulley with a mixture of 20 mg of DepoMedrol and .5 cc of 1% lidocaine. Pt tolerated the procedure well without any adverse reactions.  We injected over the more distal site where there was a possible neuroma in the amputation website.  We will see how much benefit he obtains  Procedure, treatment alternatives, risks and benefits explained, specific risks discussed. Consent was given by the patient. Immediately prior to procedure a time out was called to verify the correct patient, procedure, equipment, support staff and site/side marked as required. Patient was prepped and draped in the usual sterile fashion.

## 2025-07-24 ENCOUNTER — OFFICE VISIT (OUTPATIENT)
Dept: ORTHOPEDIC SURGERY | Facility: HOSPITAL | Age: 75
End: 2025-07-24
Payer: MEDICARE

## 2025-07-24 DIAGNOSIS — M75.22 BICIPITAL TENDINITIS OF LEFT SHOULDER: Primary | ICD-10-CM

## 2025-07-24 PROCEDURE — 1036F TOBACCO NON-USER: CPT | Performed by: ORTHOPAEDIC SURGERY

## 2025-07-24 PROCEDURE — 99213 OFFICE O/P EST LOW 20 MIN: CPT | Performed by: ORTHOPAEDIC SURGERY

## 2025-07-24 PROCEDURE — 2500000004 HC RX 250 GENERAL PHARMACY W/ HCPCS (ALT 636 FOR OP/ED): Performed by: ORTHOPAEDIC SURGERY

## 2025-07-24 PROCEDURE — 1159F MED LIST DOCD IN RCRD: CPT | Performed by: ORTHOPAEDIC SURGERY

## 2025-07-24 PROCEDURE — 76942 ECHO GUIDE FOR BIOPSY: CPT | Performed by: ORTHOPAEDIC SURGERY

## 2025-07-24 PROCEDURE — 1125F AMNT PAIN NOTED PAIN PRSNT: CPT | Performed by: ORTHOPAEDIC SURGERY

## 2025-07-24 PROCEDURE — 1160F RVW MEDS BY RX/DR IN RCRD: CPT | Performed by: ORTHOPAEDIC SURGERY

## 2025-07-24 RX ORDER — LIDOCAINE HYDROCHLORIDE 10 MG/ML
2 INJECTION, SOLUTION INFILTRATION; PERINEURAL
Status: COMPLETED | OUTPATIENT
Start: 2025-07-24 | End: 2025-07-24

## 2025-07-24 RX ORDER — METHYLPREDNISOLONE ACETATE 40 MG/ML
30 INJECTION, SUSPENSION INTRA-ARTICULAR; INTRALESIONAL; INTRAMUSCULAR; SOFT TISSUE
Status: COMPLETED | OUTPATIENT
Start: 2025-07-24 | End: 2025-07-24

## 2025-07-24 RX ADMIN — METHYLPREDNISOLONE ACETATE 30 MG: 40 INJECTION, SUSPENSION INTRA-ARTICULAR; INTRALESIONAL; INTRAMUSCULAR; SOFT TISSUE at 09:52

## 2025-07-24 RX ADMIN — LIDOCAINE HYDROCHLORIDE 2 ML: 10 INJECTION, SOLUTION INFILTRATION; PERINEURAL at 09:52

## 2025-07-24 ASSESSMENT — ENCOUNTER SYMPTOMS
WHEEZING: 0
CHILLS: 0
FATIGUE: 0
FEVER: 0
SHORTNESS OF BREATH: 0
BRUISES/BLEEDS EASILY: 0
NUMBNESS: 0

## 2025-07-24 ASSESSMENT — PAIN - FUNCTIONAL ASSESSMENT: PAIN_FUNCTIONAL_ASSESSMENT: 0-10

## 2025-07-24 ASSESSMENT — PAIN SCALES - GENERAL: PAINLEVEL_OUTOF10: 6

## 2025-07-24 NOTE — PROGRESS NOTES
Reason for Appointment  Chief Complaint   Patient presents with    Left Shoulder - Pain     History of Present Illness  Patient is a 74 y.o. male here today for follow-up evaluation of left shoulder anterior pain, pleasant gentleman whose had left shoulder anterior pain for quite some time.  Classic proximal biceps tendinitis.  Understands at this can rupture long-term.  Ultrasound today did show thickening of the biceps tendon sheath consistent with tenosynovitis.  No real lateral pain some mild stiffness.  Has had a chronic problem in the hand.  Full history reviewed.     Medical History[1]    Surgical History[2]    Medication Documentation Review Audit       Reviewed by Kevin Quinones MD (Physician) on 07/24/25 at 0950      Medication Order Taking? Sig Documenting Provider Last Dose Status   amantadine (Symmetrel) 100 mg tablet [id number] Yes 1 tab daily in the am x 1 week then 1 tab 2 times a day (am and early afternoon) thereafter Bobby Birmingham, APRN-CNP  Active   amitriptyline (Elavil) 25 mg tablet [id number] Yes Take 1 tablet (25 mg) by mouth once daily at bedtime. Tiesha Babb MD  Active   amLODIPine (Norvasc) 2.5 mg tablet [id number] Yes Take 1 tablet (2.5 mg) by mouth 2 times a day. Historical Provider, MD  Active   aspirin 81 mg chewable tablet [id number] Yes Chew once daily. Historical Provider, MD  Active   citalopram (CeleXA) 10 mg tablet [id number] Yes Take 1 tablet (10 mg) by mouth once daily. Historical Provider, MD  Active   ergocalciferol (Vitamin D-2) 1.25 MG (84825 UT) capsule [id number] Yes Take 1 capsule (1.25 mg) by mouth 1 (one) time per week. Historical Provider, MD  Active   ibuprofen (AdviL) 200 mg tablet [id number] Yes Take 3 tablets (600 mg) by mouth once daily as needed. Historical Provider, MD  Active   rosuvastatin (Crestor) 5 mg tablet [id number] Yes Take 1 tablet (5 mg) by mouth once daily at bedtime. Historical Provider, MD  Active                    RX Allergies[3]    Review  of Systems   Constitutional:  Negative for chills, fatigue and fever.   Respiratory:  Negative for shortness of breath and wheezing.    Cardiovascular:  Negative for chest pain and leg swelling.   Allergic/Immunologic: Positive for immunocompromised state.   Neurological:  Negative for numbness.   Hematological:  Does not bruise/bleed easily.       Exam   Left shoulder shows good range of motion with a mildly positive impingement sign but overall maintained cuff strength good deltoid function tender over the proximal bicep slightly more distal in the subpectoral region positive open pulm resistance test for pain  Assessment   Left bicipital tendinitis    Plan     Under ultrasound we did see the thickened sheath and we injected the left biceps tendon sheath.  Hopefully this will calm this down this can be repeated understands risk of rupture.  Follow-up as needed depending on symptoms  Patient ID: Khurram Nicole is a 74 y.o. male.    Tendon Sheath Injection on 7/24/2025 9:52 AM  Indications: pain  Details: 25 G needle, ultrasound-guided  Medications: 2 mL lidocaine 10 mg/mL (1 %); 30 mg methylPREDNISolone acetate 40 mg/mL  Outcome: tolerated well, no immediate complications    After discussing the risks and benefits of the procedure with proceeded with an injection. Using ultrasound guidance we identified the greater and lesser tuberosities and the biceps tendons sheath, images obtained and saved. We then sterilely injected the left biceps tendon sheath with a mixture of 30 mg of Depo-Medrol and 2 cc of 1 % lidocaine. Pt tolerated the procedure well without any adverse reactions    Procedure, treatment alternatives, risks and benefits explained, specific risks discussed. Consent was given by the patient. Immediately prior to procedure a time out was called to verify the correct patient, procedure, equipment, support staff and site/side marked as required. Patient was prepped and draped in the usual sterile fashion.                           [1]   Past Medical History:  Diagnosis Date    Arthritis     Cerebral vascular accident (Multi)     Cervical disc disease     Colon polyp     Displaced fracture of proximal phalanx of left ring finger with routine healing 03/14/2025    GERD (gastroesophageal reflux disease)     Hyperlipidemia     Hypertension     Joint pain     Parkinson disease (Multi)     Spinal stenosis     Stroke (Multi)    [2]   Past Surgical History:  Procedure Laterality Date    APPENDECTOMY      COLONOSCOPY      HAND SURGERY Left    [3] No Known Allergies

## 2025-08-07 DIAGNOSIS — G56.42 COMPLEX REGIONAL PAIN SYNDROME TYPE 2 OF LEFT UPPER EXTREMITY: ICD-10-CM

## 2025-08-07 DIAGNOSIS — F32.A DEPRESSION, UNSPECIFIED DEPRESSION TYPE: ICD-10-CM

## 2025-08-08 RX ORDER — AMITRIPTYLINE HYDROCHLORIDE 50 MG/1
50 TABLET, FILM COATED ORAL NIGHTLY
Qty: 30 TABLET | Refills: 11 | Status: SHIPPED | OUTPATIENT
Start: 2025-08-08 | End: 2026-08-08

## 2025-08-12 ENCOUNTER — APPOINTMENT (OUTPATIENT)
Dept: ORTHOPEDIC SURGERY | Facility: CLINIC | Age: 75
End: 2025-08-12
Payer: COMMERCIAL

## 2025-08-12 DIAGNOSIS — S62.615K: Primary | ICD-10-CM

## 2025-08-14 ENCOUNTER — OFFICE VISIT (OUTPATIENT)
Dept: ORTHOPEDIC SURGERY | Facility: HOSPITAL | Age: 75
End: 2025-08-14
Payer: COMMERCIAL

## 2025-08-14 DIAGNOSIS — S62.615B OPEN DISPLACED FRACTURE OF PROXIMAL PHALANX OF LEFT RING FINGER, INITIAL ENCOUNTER: Primary | ICD-10-CM

## 2025-08-14 PROCEDURE — 99213 OFFICE O/P EST LOW 20 MIN: CPT | Performed by: ORTHOPAEDIC SURGERY

## 2025-08-14 ASSESSMENT — PAIN - FUNCTIONAL ASSESSMENT: PAIN_FUNCTIONAL_ASSESSMENT: 0-10

## 2025-08-14 ASSESSMENT — PAIN SCALES - GENERAL: PAINLEVEL_OUTOF10: 6

## 2025-08-17 ASSESSMENT — ENCOUNTER SYMPTOMS
SHORTNESS OF BREATH: 0
JOINT SWELLING: 1
WHEEZING: 0
CHILLS: 0
SINUS PRESSURE: 0
WOUND: 0
FEVER: 0
ARTHRALGIAS: 1
FATIGUE: 0

## 2025-08-18 DIAGNOSIS — G56.42 COMPLEX REGIONAL PAIN SYNDROME TYPE 2 OF LEFT UPPER EXTREMITY: ICD-10-CM

## 2025-08-18 RX ORDER — GABAPENTIN 300 MG/1
CAPSULE ORAL
Qty: 60 CAPSULE | Refills: 5 | Status: SHIPPED | OUTPATIENT
Start: 2025-08-18

## 2025-08-25 DIAGNOSIS — G20.A1 PARKINSON'S DISEASE WITHOUT DYSKINESIA OR FLUCTUATING MANIFESTATIONS: ICD-10-CM

## 2025-08-25 RX ORDER — AMANTADINE HYDROCHLORIDE 100 MG/1
100 TABLET ORAL 2 TIMES DAILY
Qty: 180 TABLET | Refills: 3 | Status: SHIPPED | OUTPATIENT
Start: 2025-08-25

## 2025-09-30 ENCOUNTER — APPOINTMENT (OUTPATIENT)
Dept: NEUROLOGY | Facility: CLINIC | Age: 75
End: 2025-09-30
Payer: COMMERCIAL

## (undated) DEVICE — BANDAGE, COFLEX, 2 X 5 YDS, TAN, LATEX

## (undated) DEVICE — Device

## (undated) DEVICE — BLADE, OSCILLATING/SAGITTAL, 25MM X 9MM

## (undated) DEVICE — WOUND SYSTEM, DEBRIDEMENT & CLEANING, O.R DUOPAK

## (undated) DEVICE — DRESSING, NON-ADHERENT, 3 X 3 IN, STERILE

## (undated) DEVICE — BLANKET, LOWER BODY, VHA PLUS, ADULT

## (undated) DEVICE — SUTURE, CHROMIC GUT, 3-0, SH 27"

## (undated) DEVICE — MEGADYNE ADULT ELECTRODE, DUAL PLATE, W/ 3M (10') PRE-ATTACHED CORD